# Patient Record
Sex: FEMALE | Race: WHITE | NOT HISPANIC OR LATINO
[De-identification: names, ages, dates, MRNs, and addresses within clinical notes are randomized per-mention and may not be internally consistent; named-entity substitution may affect disease eponyms.]

---

## 2017-08-27 ENCOUNTER — TRANSCRIPTION ENCOUNTER (OUTPATIENT)
Age: 72
End: 2017-08-27

## 2017-10-22 ENCOUNTER — RESULT CHARGE (OUTPATIENT)
Age: 72
End: 2017-10-22

## 2017-10-23 ENCOUNTER — APPOINTMENT (OUTPATIENT)
Dept: HEART AND VASCULAR | Facility: CLINIC | Age: 72
End: 2017-10-23
Payer: COMMERCIAL

## 2017-10-23 VITALS
HEART RATE: 63 BPM | OXYGEN SATURATION: 98 % | BODY MASS INDEX: 27.19 KG/M2 | DIASTOLIC BLOOD PRESSURE: 78 MMHG | SYSTOLIC BLOOD PRESSURE: 142 MMHG | HEIGHT: 65.67 IN | WEIGHT: 167.2 LBS | TEMPERATURE: 98.2 F

## 2017-10-23 DIAGNOSIS — Z87.09 PERSONAL HISTORY OF OTHER DISEASES OF THE RESPIRATORY SYSTEM: ICD-10-CM

## 2017-10-23 DIAGNOSIS — M15.9 POLYOSTEOARTHRITIS, UNSPECIFIED: ICD-10-CM

## 2017-10-23 PROCEDURE — 93000 ELECTROCARDIOGRAM COMPLETE: CPT

## 2017-10-23 PROCEDURE — 99214 OFFICE O/P EST MOD 30 MIN: CPT | Mod: 25

## 2017-10-26 ENCOUNTER — APPOINTMENT (OUTPATIENT)
Dept: ULTRASOUND IMAGING | Facility: CLINIC | Age: 72
End: 2017-10-26
Payer: COMMERCIAL

## 2017-10-26 ENCOUNTER — APPOINTMENT (OUTPATIENT)
Dept: MAMMOGRAPHY | Facility: CLINIC | Age: 72
End: 2017-10-26
Payer: COMMERCIAL

## 2017-10-26 ENCOUNTER — OUTPATIENT (OUTPATIENT)
Dept: OUTPATIENT SERVICES | Facility: HOSPITAL | Age: 72
LOS: 1 days | End: 2017-10-26
Payer: COMMERCIAL

## 2017-10-26 DIAGNOSIS — Z95.0 PRESENCE OF CARDIAC PACEMAKER: Chronic | ICD-10-CM

## 2017-10-26 DIAGNOSIS — Z96.641 PRESENCE OF RIGHT ARTIFICIAL HIP JOINT: Chronic | ICD-10-CM

## 2017-10-26 PROCEDURE — 76641 ULTRASOUND BREAST COMPLETE: CPT | Mod: 26,50

## 2017-10-26 PROCEDURE — 77063 BREAST TOMOSYNTHESIS BI: CPT

## 2017-10-26 PROCEDURE — 77067 SCR MAMMO BI INCL CAD: CPT

## 2017-10-26 PROCEDURE — 77063 BREAST TOMOSYNTHESIS BI: CPT | Mod: 26

## 2017-10-26 PROCEDURE — G0202: CPT | Mod: 26

## 2017-10-26 PROCEDURE — 76641 ULTRASOUND BREAST COMPLETE: CPT

## 2017-12-19 ENCOUNTER — APPOINTMENT (OUTPATIENT)
Dept: NEUROLOGY | Facility: CLINIC | Age: 72
End: 2017-12-19
Payer: COMMERCIAL

## 2017-12-19 VITALS
HEART RATE: 75 BPM | SYSTOLIC BLOOD PRESSURE: 157 MMHG | HEIGHT: 65.67 IN | OXYGEN SATURATION: 95 % | DIASTOLIC BLOOD PRESSURE: 84 MMHG | WEIGHT: 163 LBS | BODY MASS INDEX: 26.51 KG/M2

## 2017-12-19 PROCEDURE — 95910 NRV CNDJ TEST 7-8 STUDIES: CPT

## 2017-12-19 PROCEDURE — 99243 OFF/OP CNSLTJ NEW/EST LOW 30: CPT | Mod: 25

## 2017-12-19 PROCEDURE — 95886 MUSC TEST DONE W/N TEST COMP: CPT | Mod: 59

## 2017-12-20 ENCOUNTER — APPOINTMENT (OUTPATIENT)
Dept: CT IMAGING | Facility: CLINIC | Age: 72
End: 2017-12-20
Payer: COMMERCIAL

## 2017-12-20 ENCOUNTER — OUTPATIENT (OUTPATIENT)
Dept: OUTPATIENT SERVICES | Facility: HOSPITAL | Age: 72
LOS: 1 days | End: 2017-12-20
Payer: COMMERCIAL

## 2017-12-20 DIAGNOSIS — M54.12 RADICULOPATHY, CERVICAL REGION: ICD-10-CM

## 2017-12-20 DIAGNOSIS — Z95.0 PRESENCE OF CARDIAC PACEMAKER: Chronic | ICD-10-CM

## 2017-12-20 DIAGNOSIS — Z96.641 PRESENCE OF RIGHT ARTIFICIAL HIP JOINT: Chronic | ICD-10-CM

## 2017-12-20 PROCEDURE — 72125 CT NECK SPINE W/O DYE: CPT | Mod: 26

## 2017-12-20 PROCEDURE — 72125 CT NECK SPINE W/O DYE: CPT

## 2018-03-13 ENCOUNTER — APPOINTMENT (OUTPATIENT)
Dept: HEART AND VASCULAR | Facility: CLINIC | Age: 73
End: 2018-03-13
Payer: COMMERCIAL

## 2018-03-13 VITALS
BODY MASS INDEX: 27.32 KG/M2 | HEART RATE: 75 BPM | WEIGHT: 164 LBS | HEIGHT: 65 IN | SYSTOLIC BLOOD PRESSURE: 115 MMHG | DIASTOLIC BLOOD PRESSURE: 75 MMHG

## 2018-03-13 PROCEDURE — 99203 OFFICE O/P NEW LOW 30 MIN: CPT | Mod: 25

## 2018-03-13 PROCEDURE — 93280 PM DEVICE PROGR EVAL DUAL: CPT

## 2018-03-16 ENCOUNTER — APPOINTMENT (OUTPATIENT)
Dept: NEUROLOGY | Facility: CLINIC | Age: 73
End: 2018-03-16
Payer: COMMERCIAL

## 2018-03-16 VITALS
SYSTOLIC BLOOD PRESSURE: 125 MMHG | WEIGHT: 164 LBS | OXYGEN SATURATION: 96 % | DIASTOLIC BLOOD PRESSURE: 70 MMHG | HEIGHT: 65 IN | HEART RATE: 83 BPM | BODY MASS INDEX: 27.32 KG/M2

## 2018-03-16 PROCEDURE — 99214 OFFICE O/P EST MOD 30 MIN: CPT

## 2018-08-06 ENCOUNTER — APPOINTMENT (OUTPATIENT)
Dept: HEART AND VASCULAR | Facility: CLINIC | Age: 73
End: 2018-08-06
Payer: COMMERCIAL

## 2018-08-06 VITALS
HEIGHT: 65 IN | SYSTOLIC BLOOD PRESSURE: 141 MMHG | BODY MASS INDEX: 27.82 KG/M2 | WEIGHT: 167 LBS | HEART RATE: 66 BPM | DIASTOLIC BLOOD PRESSURE: 83 MMHG | OXYGEN SATURATION: 97 % | TEMPERATURE: 97.5 F

## 2018-08-06 PROCEDURE — 36415 COLL VENOUS BLD VENIPUNCTURE: CPT

## 2018-08-06 PROCEDURE — 99214 OFFICE O/P EST MOD 30 MIN: CPT | Mod: 25

## 2018-08-07 LAB
ALBUMIN SERPL ELPH-MCNC: 5.2 G/DL
ALP BLD-CCNC: 109 U/L
ALT SERPL-CCNC: 26 U/L
ANION GAP SERPL CALC-SCNC: 15 MMOL/L
AST SERPL-CCNC: 25 U/L
BASOPHILS # BLD AUTO: 0.04 K/UL
BASOPHILS NFR BLD AUTO: 0.6 %
BILIRUB SERPL-MCNC: 0.8 MG/DL
BUN SERPL-MCNC: 18 MG/DL
CALCIUM SERPL-MCNC: 9.8 MG/DL
CHLORIDE SERPL-SCNC: 101 MMOL/L
CHOLEST SERPL-MCNC: 221 MG/DL
CHOLEST/HDLC SERPL: 3.3 RATIO
CO2 SERPL-SCNC: 24 MMOL/L
CREAT SERPL-MCNC: 0.86 MG/DL
EOSINOPHIL # BLD AUTO: 0.11 K/UL
EOSINOPHIL NFR BLD AUTO: 1.6 %
GLUCOSE SERPL-MCNC: 101 MG/DL
HBA1C MFR BLD HPLC: 5.8 %
HCT VFR BLD CALC: 44.6 %
HDLC SERPL-MCNC: 68 MG/DL
HGB BLD-MCNC: 14.4 G/DL
IMM GRANULOCYTES NFR BLD AUTO: 0.1 %
LDLC SERPL CALC-MCNC: 133 MG/DL
LYMPHOCYTES # BLD AUTO: 2.12 K/UL
LYMPHOCYTES NFR BLD AUTO: 31.1 %
MAN DIFF?: NORMAL
MCHC RBC-ENTMCNC: 30.3 PG
MCHC RBC-ENTMCNC: 32.3 GM/DL
MCV RBC AUTO: 93.7 FL
MONOCYTES # BLD AUTO: 0.49 K/UL
MONOCYTES NFR BLD AUTO: 7.2 %
NEUTROPHILS # BLD AUTO: 4.05 K/UL
NEUTROPHILS NFR BLD AUTO: 59.4 %
PLATELET # BLD AUTO: 326 K/UL
POTASSIUM SERPL-SCNC: 4.3 MMOL/L
PROT SERPL-MCNC: 7.4 G/DL
RBC # BLD: 4.76 M/UL
RBC # FLD: 14.4 %
SODIUM SERPL-SCNC: 140 MMOL/L
TRIGL SERPL-MCNC: 100 MG/DL
WBC # FLD AUTO: 6.82 K/UL

## 2018-08-08 ENCOUNTER — EMERGENCY (EMERGENCY)
Facility: HOSPITAL | Age: 73
LOS: 1 days | Discharge: HOME | End: 2018-08-08
Attending: EMERGENCY MEDICINE | Admitting: EMERGENCY MEDICINE
Payer: OTHER MISCELLANEOUS

## 2018-08-08 VITALS
SYSTOLIC BLOOD PRESSURE: 182 MMHG | HEART RATE: 73 BPM | DIASTOLIC BLOOD PRESSURE: 95 MMHG | OXYGEN SATURATION: 97 % | HEIGHT: 68 IN | TEMPERATURE: 99 F | WEIGHT: 164.02 LBS | RESPIRATION RATE: 18 BRPM

## 2018-08-08 VITALS
DIASTOLIC BLOOD PRESSURE: 65 MMHG | OXYGEN SATURATION: 98 % | RESPIRATION RATE: 18 BRPM | SYSTOLIC BLOOD PRESSURE: 135 MMHG | HEART RATE: 72 BPM

## 2018-08-08 DIAGNOSIS — J45.909 UNSPECIFIED ASTHMA, UNCOMPLICATED: ICD-10-CM

## 2018-08-08 DIAGNOSIS — Z88.0 ALLERGY STATUS TO PENICILLIN: ICD-10-CM

## 2018-08-08 DIAGNOSIS — Z95.0 PRESENCE OF CARDIAC PACEMAKER: Chronic | ICD-10-CM

## 2018-08-08 DIAGNOSIS — S20.222A CONTUSION OF LEFT BACK WALL OF THORAX, INITIAL ENCOUNTER: ICD-10-CM

## 2018-08-08 DIAGNOSIS — M25.521 PAIN IN RIGHT ELBOW: ICD-10-CM
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
## 2018-08-08 DIAGNOSIS — M25.552 PAIN IN LEFT HIP: ICD-10-CM
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
## 2018-08-08 DIAGNOSIS — S40.012A CONTUSION OF LEFT SHOULDER, INITIAL ENCOUNTER: ICD-10-CM

## 2018-08-08 DIAGNOSIS — M54.12 RADICULOPATHY, CERVICAL REGION: ICD-10-CM

## 2018-08-08 DIAGNOSIS — Z79.899 OTHER LONG TERM (CURRENT) DRUG THERAPY: ICD-10-CM
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
## 2018-08-08 DIAGNOSIS — I25.10 ATHEROSCLEROTIC HEART DISEASE OF NATIVE CORONARY ARTERY WITHOUT ANGINA PECTORIS: ICD-10-CM

## 2018-08-08 DIAGNOSIS — Z96.641 PRESENCE OF RIGHT ARTIFICIAL HIP JOINT: Chronic | ICD-10-CM

## 2018-08-08 LAB
APTT BLD: 33.2 SEC — SIGNIFICANT CHANGE UP (ref 27.5–37.4)
BASOPHILS NFR BLD AUTO: 0.6 % — SIGNIFICANT CHANGE UP (ref 0–2)
CK MB CFR SERPL CALC: 5.4 NG/ML — SIGNIFICANT CHANGE UP (ref 0–6.7)
CK SERPL-CCNC: 126 U/L — SIGNIFICANT CHANGE UP (ref 25–170)
EOSINOPHIL NFR BLD AUTO: 1.8 % — SIGNIFICANT CHANGE UP (ref 0–6)
HCT VFR BLD CALC: 38.3 % — SIGNIFICANT CHANGE UP (ref 34.5–45)
HGB BLD-MCNC: 12.8 G/DL — SIGNIFICANT CHANGE UP (ref 11.5–15.5)
INR BLD: 1.07 — SIGNIFICANT CHANGE UP (ref 0.88–1.16)
LYMPHOCYTES # BLD AUTO: 35.4 % — SIGNIFICANT CHANGE UP (ref 13–44)
MCHC RBC-ENTMCNC: 29.9 PG — SIGNIFICANT CHANGE UP (ref 27–34)
MCHC RBC-ENTMCNC: 33.4 G/DL — SIGNIFICANT CHANGE UP (ref 32–36)
MCV RBC AUTO: 89.5 FL — SIGNIFICANT CHANGE UP (ref 80–100)
MONOCYTES NFR BLD AUTO: 9 % — SIGNIFICANT CHANGE UP (ref 2–14)
NEUTROPHILS NFR BLD AUTO: 53.2 % — SIGNIFICANT CHANGE UP (ref 43–77)
PLATELET # BLD AUTO: 269 K/UL — SIGNIFICANT CHANGE UP (ref 150–400)
PROTHROM AB SERPL-ACNC: 11.9 SEC — SIGNIFICANT CHANGE UP (ref 9.8–12.7)
RBC # BLD: 4.28 M/UL — SIGNIFICANT CHANGE UP (ref 3.8–5.2)
RBC # FLD: 13.2 % — SIGNIFICANT CHANGE UP (ref 10.3–16.9)
TROPONIN T SERPL-MCNC: <0.01 NG/ML — SIGNIFICANT CHANGE UP (ref 0–0.01)
WBC # BLD: 5.1 K/UL — SIGNIFICANT CHANGE UP (ref 3.8–10.5)
WBC # FLD AUTO: 5.1 K/UL — SIGNIFICANT CHANGE UP (ref 3.8–10.5)

## 2018-08-08 PROCEDURE — 72125 CT NECK SPINE W/O DYE: CPT | Mod: 26

## 2018-08-08 PROCEDURE — 72170 X-RAY EXAM OF PELVIS: CPT

## 2018-08-08 PROCEDURE — 73080 X-RAY EXAM OF ELBOW: CPT | Mod: 26,RT

## 2018-08-08 PROCEDURE — 70450 CT HEAD/BRAIN W/O DYE: CPT

## 2018-08-08 PROCEDURE — 73502 X-RAY EXAM HIP UNI 2-3 VIEWS: CPT

## 2018-08-08 PROCEDURE — 99284 EMERGENCY DEPT VISIT MOD MDM: CPT

## 2018-08-08 PROCEDURE — 82553 CREATINE MB FRACTION: CPT

## 2018-08-08 PROCEDURE — 85730 THROMBOPLASTIN TIME PARTIAL: CPT

## 2018-08-08 PROCEDURE — 85610 PROTHROMBIN TIME: CPT

## 2018-08-08 PROCEDURE — 36415 COLL VENOUS BLD VENIPUNCTURE: CPT

## 2018-08-08 PROCEDURE — 73502 X-RAY EXAM HIP UNI 2-3 VIEWS: CPT | Mod: 26,LT

## 2018-08-08 PROCEDURE — 84484 ASSAY OF TROPONIN QUANT: CPT

## 2018-08-08 PROCEDURE — 72125 CT NECK SPINE W/O DYE: CPT

## 2018-08-08 PROCEDURE — 85025 COMPLETE CBC W/AUTO DIFF WBC: CPT

## 2018-08-08 PROCEDURE — 82550 ASSAY OF CK (CPK): CPT

## 2018-08-08 PROCEDURE — 73080 X-RAY EXAM OF ELBOW: CPT

## 2018-08-08 PROCEDURE — 70450 CT HEAD/BRAIN W/O DYE: CPT | Mod: 26

## 2018-08-08 PROCEDURE — 99284 EMERGENCY DEPT VISIT MOD MDM: CPT | Mod: 25

## 2018-08-08 RX ORDER — ACETAMINOPHEN 500 MG
650 TABLET ORAL ONCE
Qty: 0 | Refills: 0 | Status: COMPLETED | OUTPATIENT
Start: 2018-08-08 | End: 2018-08-08

## 2018-08-08 RX ORDER — IBUPROFEN 200 MG
600 TABLET ORAL ONCE
Qty: 0 | Refills: 0 | Status: COMPLETED | OUTPATIENT
Start: 2018-08-08 | End: 2018-08-08

## 2018-08-08 RX ADMIN — Medication 600 MILLIGRAM(S): at 20:01

## 2018-08-08 RX ADMIN — Medication 650 MILLIGRAM(S): at 19:57

## 2018-08-08 RX ADMIN — Medication 650 MILLIGRAM(S): at 17:35

## 2018-08-08 RX ADMIN — Medication 600 MILLIGRAM(S): at 20:09

## 2018-08-08 NOTE — ED ADULT NURSE NOTE - OBJECTIVE STATEMENT
Patient c/o of left neck pain 7/10 w/ numbness and tingling on left hand. Patient c/o of left neck pain 7/10 w/ numbness and tingling on left hand.  Also c/o of left hip pain and right elbow pain.   Patient states fell yesterday on stairwell, hit back, states did not hit head, ambulatory after, w/ current pain on walking.

## 2018-08-08 NOTE — ED PROVIDER NOTE - MUSCULOSKELETAL, MLM
Spine appears normal, range of motion is not limited, no localized vertebral point of tenderness, left shoulder generalized tenderness, left upper back, along the scapula line tenderness, no deformity, neuro vascular b/l upper and lower extremities intact, symmetrical,

## 2018-08-08 NOTE — ED PROVIDER NOTE - CARE PLAN
Principal Discharge DX:	Multiple contusions  Secondary Diagnosis:	Cervical radiculopathy  Secondary Diagnosis:	Paresthesia of arm

## 2018-08-08 NOTE — ED PROVIDER NOTE - ATTENDING CONTRIBUTION TO CARE
Pt s/p fall on stairs yesterday with left lower hip pain and L sided neck pain. cannot remember if hit head, loc. no assoc cp, sob, lightheadedness, seizure like activity assoc w fall. NAD, RRR, CTAB, no abdominal tenderness, no focal neuro deficits, no midline spinal pain, ambulating. Will obtain labs, imaging to eval for arrythmia, intracranial process, metabolic derangement, trauma vs other. anticipate dc if no acute process. no cp/sob during ER stay.

## 2018-08-08 NOTE — ED PROVIDER NOTE - MEDICAL DECISION MAKING DETAILS
71 yo female s/p mechanical fall at work yesterday.  No LOC, no  syncope suspected. Pt ambulatory, appears well. ECG- non ischemic, labs including cardiac enzymes- normal, no evidence of acute fx on pelvis/hip/elbow xrays and c/spine CT scan. findings consistent with c-spine radiculopathy and degenerative changes. results discussed with pt. She will f/u with her neurologist and PMD/cardiologist for further evaluation.

## 2018-08-08 NOTE — ED ADULT NURSE NOTE - PSH
Artificial pacemaker  July 2005  History of right hip replacement  right total hip replacement  2014  Open fracture of neck of femur  Hip fracture requiring operative repair, right, open type I or II, initial encounter  2003  Presence of cardiac pacemaker  Pacemaker

## 2018-08-08 NOTE — ED ADULT NURSE NOTE - NSIMPLEMENTINTERV_GEN_ALL_ED
Implemented All Fall Risk Interventions:  [place] to call system. Call bell, personal items and telephone within reach. Instruct patient to call for assistance. Room bathroom lighting operational. Non-slip footwear when patient is off stretcher. Physically safe environment: no spills, clutter or unnecessary equipment. Stretcher in lowest position, wheels locked, appropriate side rails in place. Provide visual cue, wrist band, yellow gown, etc. Monitor gait and stability. Monitor for mental status changes and reorient to person, place, and time. Review medications for side effects contributing to fall risk. Reinforce activity limits and safety measures with patient and family.

## 2018-08-08 NOTE — ED ADULT NURSE NOTE - PMH
Asthma  Asthma  Coronary artery disease without angina pectoris, unspecified vessel or lesion type, unspecified whether native or transplanted heart    Cyst of right ovary  elevated CEA  Essential hypertension  Hypertension  Pacemaker  for supraventricular beats

## 2018-08-08 NOTE — ED ADULT NURSE REASSESSMENT NOTE - NS ED NURSE REASSESS COMMENT FT1
Patient a/oX 3, symptoms and pain improved s/p Acetaminopohen, Ibuprofen PO.  Vital signs stable.  All labs and tests resulted.  Discharged to home in stable condition.

## 2018-08-08 NOTE — ED PROVIDER NOTE - OBJECTIVE STATEMENT
73 yo female with h/o right hip replacement, h/o cervical radiculopathy, HTN, pacemaker, in the ER c/o accidental fall yesterday at work. Pt reports she slipped and fell going down the stairs, fell on her back. Pt denies LOC, mentioned that was able to ambulate after her fall shortly. Pt also c/o right elbow pain, left hip and left buttock pain, c/o left shoulder region and neck pain with movement, also c/o tingling to her left arm. Pt concerned  that today she also felt pain to her left anterior upper chest region. Denies SOB, HA, dizziness, n/v, weakness or diaphoresis.

## 2018-08-08 NOTE — ED ADULT TRIAGE NOTE - ARRIVAL INFO ADDITIONAL COMMENTS
states she tripped and fell down 5-6 steps yesterday. denies any head injuries. c.o right elbow pain, neck pain, left shoulder pain and left buttock pain. takes ASA one a day.

## 2018-08-08 NOTE — ED ADULT NURSE REASSESSMENT NOTE - NS ED NURSE REASSESS COMMENT FT1
Patient a/ox 3, c/o of left neck, back, left hip and right elbow pain s/p fall.  Paced rhythm on EKG and cardiac monitor, vital signs stable and improved.  Acetaminophen 650mg po administered for pain.  Right FA PIV #20 in place, all labs sent, no complications.  Xray and CT scan done.  REsults and disposition pending.  STable and comfortable.

## 2018-08-14 ENCOUNTER — APPOINTMENT (OUTPATIENT)
Dept: NEUROLOGY | Facility: CLINIC | Age: 73
End: 2018-08-14
Payer: COMMERCIAL

## 2018-08-14 ENCOUNTER — FORM ENCOUNTER (OUTPATIENT)
Age: 73
End: 2018-08-14

## 2018-08-14 VITALS
WEIGHT: 165 LBS | TEMPERATURE: 98 F | OXYGEN SATURATION: 97 % | BODY MASS INDEX: 25.01 KG/M2 | HEIGHT: 68 IN | DIASTOLIC BLOOD PRESSURE: 93 MMHG | SYSTOLIC BLOOD PRESSURE: 163 MMHG | HEART RATE: 59 BPM

## 2018-08-14 PROCEDURE — 99214 OFFICE O/P EST MOD 30 MIN: CPT

## 2018-08-15 ENCOUNTER — APPOINTMENT (OUTPATIENT)
Dept: ORTHOPEDIC SURGERY | Facility: CLINIC | Age: 73
End: 2018-08-15
Payer: COMMERCIAL

## 2018-08-15 ENCOUNTER — OUTPATIENT (OUTPATIENT)
Dept: OUTPATIENT SERVICES | Facility: HOSPITAL | Age: 73
LOS: 1 days | End: 2018-08-15
Payer: COMMERCIAL

## 2018-08-15 VITALS — HEIGHT: 68 IN | WEIGHT: 165 LBS | BODY MASS INDEX: 25.01 KG/M2

## 2018-08-15 DIAGNOSIS — Z96.641 PRESENCE OF RIGHT ARTIFICIAL HIP JOINT: Chronic | ICD-10-CM

## 2018-08-15 DIAGNOSIS — Z95.0 PRESENCE OF CARDIAC PACEMAKER: Chronic | ICD-10-CM

## 2018-08-15 DIAGNOSIS — Z87.39 PERSONAL HISTORY OF OTHER DISEASES OF THE MUSCULOSKELETAL SYSTEM AND CONNECTIVE TISSUE: ICD-10-CM

## 2018-08-15 PROCEDURE — 72050 X-RAY EXAM NECK SPINE 4/5VWS: CPT | Mod: 26

## 2018-08-15 PROCEDURE — 99214 OFFICE O/P EST MOD 30 MIN: CPT

## 2018-08-15 PROCEDURE — 72050 X-RAY EXAM NECK SPINE 4/5VWS: CPT

## 2018-09-12 ENCOUNTER — APPOINTMENT (OUTPATIENT)
Dept: ORTHOPEDIC SURGERY | Facility: CLINIC | Age: 73
End: 2018-09-12
Payer: COMMERCIAL

## 2018-09-12 PROCEDURE — 99215 OFFICE O/P EST HI 40 MIN: CPT

## 2018-10-23 ENCOUNTER — TRANSCRIPTION ENCOUNTER (OUTPATIENT)
Age: 73
End: 2018-10-23

## 2018-10-23 ENCOUNTER — EMERGENCY (EMERGENCY)
Facility: HOSPITAL | Age: 73
LOS: 1 days | End: 2018-10-23
Attending: EMERGENCY MEDICINE
Payer: COMMERCIAL

## 2018-10-23 VITALS
HEART RATE: 82 BPM | HEIGHT: 68 IN | OXYGEN SATURATION: 98 % | TEMPERATURE: 97 F | WEIGHT: 164.02 LBS | RESPIRATION RATE: 16 BRPM | SYSTOLIC BLOOD PRESSURE: 189 MMHG | DIASTOLIC BLOOD PRESSURE: 102 MMHG

## 2018-10-23 DIAGNOSIS — Z96.641 PRESENCE OF RIGHT ARTIFICIAL HIP JOINT: Chronic | ICD-10-CM

## 2018-10-23 DIAGNOSIS — Z95.0 PRESENCE OF CARDIAC PACEMAKER: Chronic | ICD-10-CM

## 2018-10-23 LAB
ALBUMIN SERPL ELPH-MCNC: 4.3 G/DL — SIGNIFICANT CHANGE UP (ref 3.3–5)
ALP SERPL-CCNC: 103 U/L — SIGNIFICANT CHANGE UP (ref 40–120)
ALT FLD-CCNC: 19 U/L — SIGNIFICANT CHANGE UP (ref 10–45)
ANION GAP SERPL CALC-SCNC: 13 MMOL/L — SIGNIFICANT CHANGE UP (ref 5–17)
APTT BLD: 31.3 SEC — SIGNIFICANT CHANGE UP (ref 27.5–37.4)
AST SERPL-CCNC: 20 U/L — SIGNIFICANT CHANGE UP (ref 10–40)
BASOPHILS # BLD AUTO: 0 K/UL — SIGNIFICANT CHANGE UP (ref 0–0.2)
BASOPHILS NFR BLD AUTO: 0.7 % — SIGNIFICANT CHANGE UP (ref 0–2)
BILIRUB SERPL-MCNC: 0.9 MG/DL — SIGNIFICANT CHANGE UP (ref 0.2–1.2)
BUN SERPL-MCNC: 11 MG/DL — SIGNIFICANT CHANGE UP (ref 7–23)
CALCIUM SERPL-MCNC: 9.6 MG/DL — SIGNIFICANT CHANGE UP (ref 8.4–10.5)
CHLORIDE SERPL-SCNC: 102 MMOL/L — SIGNIFICANT CHANGE UP (ref 96–108)
CO2 SERPL-SCNC: 24 MMOL/L — SIGNIFICANT CHANGE UP (ref 22–31)
CREAT SERPL-MCNC: 0.7 MG/DL — SIGNIFICANT CHANGE UP (ref 0.5–1.3)
EOSINOPHIL # BLD AUTO: 0.1 K/UL — SIGNIFICANT CHANGE UP (ref 0–0.5)
EOSINOPHIL NFR BLD AUTO: 1 % — SIGNIFICANT CHANGE UP (ref 0–6)
ERYTHROCYTE [SEDIMENTATION RATE] IN BLOOD: 7 MM/HR — SIGNIFICANT CHANGE UP (ref 0–20)
GLUCOSE SERPL-MCNC: 88 MG/DL — SIGNIFICANT CHANGE UP (ref 70–99)
HCT VFR BLD CALC: 39.1 % — SIGNIFICANT CHANGE UP (ref 34.5–45)
HGB BLD-MCNC: 13.3 G/DL — SIGNIFICANT CHANGE UP (ref 11.5–15.5)
INR BLD: 1.06 RATIO — SIGNIFICANT CHANGE UP (ref 0.88–1.16)
LYMPHOCYTES # BLD AUTO: 1.6 K/UL — SIGNIFICANT CHANGE UP (ref 1–3.3)
LYMPHOCYTES # BLD AUTO: 25.7 % — SIGNIFICANT CHANGE UP (ref 13–44)
MCHC RBC-ENTMCNC: 30 PG — SIGNIFICANT CHANGE UP (ref 27–34)
MCHC RBC-ENTMCNC: 34 GM/DL — SIGNIFICANT CHANGE UP (ref 32–36)
MCV RBC AUTO: 88.3 FL — SIGNIFICANT CHANGE UP (ref 80–100)
MONOCYTES # BLD AUTO: 0.5 K/UL — SIGNIFICANT CHANGE UP (ref 0–0.9)
MONOCYTES NFR BLD AUTO: 8.8 % — SIGNIFICANT CHANGE UP (ref 2–14)
NEUTROPHILS # BLD AUTO: 3.9 K/UL — SIGNIFICANT CHANGE UP (ref 1.8–7.4)
NEUTROPHILS NFR BLD AUTO: 63.7 % — SIGNIFICANT CHANGE UP (ref 43–77)
PLATELET # BLD AUTO: 288 K/UL — SIGNIFICANT CHANGE UP (ref 150–400)
POTASSIUM SERPL-MCNC: 3.7 MMOL/L — SIGNIFICANT CHANGE UP (ref 3.5–5.3)
POTASSIUM SERPL-SCNC: 3.7 MMOL/L — SIGNIFICANT CHANGE UP (ref 3.5–5.3)
PROT SERPL-MCNC: 6.7 G/DL — SIGNIFICANT CHANGE UP (ref 6–8.3)
PROTHROM AB SERPL-ACNC: 11.5 SEC — SIGNIFICANT CHANGE UP (ref 9.8–12.7)
RBC # BLD: 4.43 M/UL — SIGNIFICANT CHANGE UP (ref 3.8–5.2)
RBC # FLD: 12.4 % — SIGNIFICANT CHANGE UP (ref 10.3–14.5)
SODIUM SERPL-SCNC: 139 MMOL/L — SIGNIFICANT CHANGE UP (ref 135–145)
WBC # BLD: 6 K/UL — SIGNIFICANT CHANGE UP (ref 3.8–10.5)
WBC # FLD AUTO: 6 K/UL — SIGNIFICANT CHANGE UP (ref 3.8–10.5)

## 2018-10-23 PROCEDURE — 73140 X-RAY EXAM OF FINGER(S): CPT | Mod: 26,RT

## 2018-10-23 PROCEDURE — 99218: CPT

## 2018-10-23 RX ORDER — LISINOPRIL 2.5 MG/1
40 TABLET ORAL DAILY
Qty: 0 | Refills: 0 | Status: DISCONTINUED | OUTPATIENT
Start: 2018-10-23 | End: 2018-10-27

## 2018-10-23 RX ORDER — ASPIRIN/CALCIUM CARB/MAGNESIUM 324 MG
81 TABLET ORAL DAILY
Qty: 0 | Refills: 0 | Status: DISCONTINUED | OUTPATIENT
Start: 2018-10-23 | End: 2018-10-27

## 2018-10-23 RX ORDER — SODIUM CHLORIDE 9 MG/ML
3 INJECTION INTRAMUSCULAR; INTRAVENOUS; SUBCUTANEOUS EVERY 8 HOURS
Qty: 0 | Refills: 0 | Status: DISCONTINUED | OUTPATIENT
Start: 2018-10-23 | End: 2018-10-27

## 2018-10-23 RX ADMIN — Medication 100 MILLIGRAM(S): at 22:45

## 2018-10-23 NOTE — ED ADULT NURSE NOTE - NSIMPLEMENTINTERV_GEN_ALL_ED
Implemented All Universal Safety Interventions:  [place] to call system. Call bell, personal items and telephone within reach. Instruct patient to call for assistance. Room bathroom lighting operational. Non-slip footwear when patient is off stretcher. Physically safe environment: no spills, clutter or unnecessary equipment. Stretcher in lowest position, wheels locked, appropriate side rails in place.

## 2018-10-23 NOTE — ED ADULT NURSE NOTE - OBJECTIVE STATEMENT
71 yo presents to the ED from home. A&Ox4 c/o small bleeding cut on her right 2nd finger, noted last night, unsure what she cut it on, reports that she was doing the dishes but denies any broken dishes. pt reports that this morning she noted a blister form which throughout the day became reddened, swollen and blister became more full of fluid. pt reports increased pain with bending finger. denies fever, chills. pt was sent to ED from urgent care. PMH of right hip replacement, h/o cervical radiculopathy, HTN, pacemaker. VSS. well appearing.

## 2018-10-23 NOTE — ED PROVIDER NOTE - OBJECTIVE STATEMENT
71 yo female presents to the ER for evaluation of right pointer finger infection. Pt states "I noticed  abrasions to finger yesterday and I am not sure how I injured my finger.  Today I noticed that my finger became swollen, red and painful". 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  Pt denies recent fevers and chills and reports mild throbbing pain to site.

## 2018-10-23 NOTE — ED ADULT TRIAGE NOTE - CHIEF COMPLAINT QUOTE
pt reports she had a small bleeding cut on her right 2nd finger that throughout the day became reddened, swollen and with a pus filled blister  pt was sent to ED from urgent care

## 2018-10-23 NOTE — ED PROVIDER NOTE - ATTENDING CONTRIBUTION TO CARE
Attending MD Perez: I personally have seen and examined this patient.  NP note reviewed and agree on plan of care and except where noted.  See below for details.    Patient seen in FT eye room, unaccompanied     72F with PMH/PSH including HTN, DM, bradycardia, R hip ORIF, TAHBSO presents to the ED with R index finger pain, swelling.  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  R hand dominant.  Reports that she had also been gardening on Sunday.  Reports that today she noted that the finger had become erythematous, edematous and painful.  Denies fevers, chills.  Reports able to range finger but painful. Denies chest pain, shortness of breath, palpitations. Denies abdominal pain, nausea, vomiting, diarrhea, blood in stools. Denies urinary complaints.  On exam, NAD, head NCAT, unlabored breathing, moving all extremities, ambulating unassisted with steady gait, R index finger with abscess to the dorsolateral aspect at the DIP, able to flex and extend against resistance at DIP, PIP and MCP of R index finger, +tenderness around area of abscess but none extending proximally, +streaking up forearm to about mid forearm along dorsal aspect, +2 radials, cap refill <2s, mild swelling to dorsal hand extending from base of R index finger, mild edema; A/P: 72F with R index finger abscess with cellulitis extending up forearm, will obtain labs, cultures, consult ortho hand, likely will be placed in CDU for IV antibiotics and reassessment

## 2018-10-23 NOTE — ED CDU PROVIDER INITIAL DAY NOTE - ATTENDING CONTRIBUTION TO CARE
Attending MD Perez:   I personally have seen and examined this patient.  Physician assistant note reviewed and agree on plan of care and except where noted.  See below for details.     Patient seen in FT eye room, unaccompanied     72F with PMH/PSH including HTN, DM, bradycardia, R hip ORIF, TAHBSO presents to the ED with R index finger pain, swelling.  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  R hand dominant.  Reports that she had also been gardening on Sunday.  Reports that today she noted that the finger had become erythematous, edematous and painful.  Denies fevers, chills.  Reports able to range finger but painful. Denies chest pain, shortness of breath, palpitations. Denies abdominal pain, nausea, vomiting, diarrhea, blood in stools. Denies urinary complaints.  On exam, NAD, head NCAT, unlabored breathing, moving all extremities, ambulating unassisted with steady gait, R index finger with abscess to the dorsolateral aspect at the DIP, able to flex and extend against resistance at DIP, PIP and MCP of R index finger, +tenderness around area of abscess but none extending proximally, +streaking up forearm to about mid forearm along dorsal aspect, +2 radials, cap refill <2s, mild swelling to dorsal hand extending from base of R index finger, mild edema; A/P: 72F with R index finger abscess with cellulitis extending up forearm, labs and cultures obtained, seen by ortho hand, CDU for IV antibiotics and reassessment

## 2018-10-23 NOTE — ED CDU PROVIDER DISPOSITION NOTE - CLINICAL COURSE
Patient is 71 yo female w/ reported hx of HTN presented to ED for evaluation of right pointer finger infection. Pt states she noticed a small cut to same finger while washing dishes yesterday and today noticed finger became swollen, red and painful. Pt states she went to urgent care and referred to ED for further evaluation. 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  In ED, patient had laboratory unremarkable and was started on Clindamycin IVPB. Hand specialist was consulted  and patient sent to CDU for IV antibiotics and pain control. Patient is 71 yo female w/ reported hx of HTN presented to ED for evaluation of right pointer finger infection. Pt states she noticed a small cut to same finger while washing dishes yesterday and today noticed finger became swollen, red and painful. Pt states she went to urgent care and referred to ED for further evaluation. 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  In ED, patient had laboratory unremarkable and was started on Clindamycin IVPB. Hand specialist was consulted and patient sent to CDU for IV antibiotics and pain control. Orthopedics performed an I&D on the abscess on the 2nd digit with dressing placement. Pt was afebrile overnight. In the AM erythema of RUE had resolved, R 2nd finger remained NVI, mild swelling and TTP. No proximal LAD, no crepitus, no fluctuance. Ortho was contacted and stated that they did not need to reevaluate pt in the CDU. Pt stable and ready for DC.

## 2018-10-23 NOTE — ED CDU PROVIDER INITIAL DAY NOTE - OBJECTIVE STATEMENT
Patient is 71 yo female w/ reported hx of HTN presented to ED for evaluation of right pointer finger infection. Pt states she noticed a small cut to same finger while washing dishes yesterday and today noticed finger became swollen, red and painful. Pt states she went to urgent care and referred to ED for further evaluation. 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    In ED, patient had laboratory unremarkable and was started on Clindamycin IVPB. Hand specialist was consulted  and patient sent to CDU for IV antibiotics and pain control. Patient is 73 yo female w/ reported hx of HTN presented to ED for evaluation of right pointer finger infection. Pt states she noticed a small cut to same finger while washing dishes yesterday and today noticed finger became swollen, red and painful. Pt states she went to urgent care and referred to ED for further evaluation. 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    In ED, patient had laboratory unremarkable and was started on Clindamycin IVPB. Hand specialist was consulted  and patient sent to CDU for IV antibiotics and pain control. Patient reports tetanus vaccination uptodate (2017)

## 2018-10-23 NOTE — ED CDU PROVIDER DISPOSITION NOTE - PLAN OF CARE
Follow up with .........Rest and elevate affected area. Take Clindamycin 300mg four times daily. Take Motrin 600mg every 8 hours with food for pain. Any worsening redness, swelling, streaking (red lines), fever, chills retrun to ER 1) Follow up with the hand specialist (Dr. Jansen) outpatient in 1 week.   2) Rest and elevate affected area. Wash the area, change the dressing and apply topical antibiotic daily.  3) Take Clindamycin 300mg four times daily, take a probiotic with this medication. Take Motrin 600mg every 8 hours with food for pain.   4) Return to the ER if you experience any worsening redness, swelling, streaking (red lines), fever, chills, numbness, tingling, weakness, inability to move finger, or if any other concerns.

## 2018-10-24 VITALS
OXYGEN SATURATION: 95 % | TEMPERATURE: 98 F | RESPIRATION RATE: 18 BRPM | HEART RATE: 71 BPM | SYSTOLIC BLOOD PRESSURE: 156 MMHG | DIASTOLIC BLOOD PRESSURE: 90 MMHG

## 2018-10-24 LAB — CRP SERPL-MCNC: 0.23 MG/DL — SIGNIFICANT CHANGE UP (ref 0–0.4)

## 2018-10-24 PROCEDURE — 26010 DRAINAGE OF FINGER ABSCESS: CPT

## 2018-10-24 PROCEDURE — 85027 COMPLETE CBC AUTOMATED: CPT

## 2018-10-24 PROCEDURE — 85610 PROTHROMBIN TIME: CPT

## 2018-10-24 PROCEDURE — 87205 SMEAR GRAM STAIN: CPT

## 2018-10-24 PROCEDURE — 99284 EMERGENCY DEPT VISIT MOD MDM: CPT | Mod: 25

## 2018-10-24 PROCEDURE — G0378: CPT

## 2018-10-24 PROCEDURE — 86140 C-REACTIVE PROTEIN: CPT

## 2018-10-24 PROCEDURE — 87070 CULTURE OTHR SPECIMN AEROBIC: CPT

## 2018-10-24 PROCEDURE — 87186 SC STD MICRODIL/AGAR DIL: CPT

## 2018-10-24 PROCEDURE — 80053 COMPREHEN METABOLIC PANEL: CPT

## 2018-10-24 PROCEDURE — 85652 RBC SED RATE AUTOMATED: CPT

## 2018-10-24 PROCEDURE — 96374 THER/PROPH/DIAG INJ IV PUSH: CPT

## 2018-10-24 PROCEDURE — 73140 X-RAY EXAM OF FINGER(S): CPT

## 2018-10-24 PROCEDURE — 96376 TX/PRO/DX INJ SAME DRUG ADON: CPT | Mod: XU

## 2018-10-24 PROCEDURE — 99217: CPT

## 2018-10-24 PROCEDURE — 85730 THROMBOPLASTIN TIME PARTIAL: CPT

## 2018-10-24 RX ADMIN — SODIUM CHLORIDE 3 MILLILITER(S): 9 INJECTION INTRAMUSCULAR; INTRAVENOUS; SUBCUTANEOUS at 06:47

## 2018-10-24 RX ADMIN — Medication 100 MILLIGRAM(S): at 06:38

## 2018-10-24 NOTE — ED CDU PROVIDER SUBSEQUENT DAY NOTE - HISTORY
CDU PROGRESS NOTE PA JUDITH: Patient was evaluated by Plastics, I&D performed abscess right 2nd digit. Pt resting comfortably, without complaint. NAD, VSS., afebrile, Will continue with antibiotics and monitor.

## 2018-10-24 NOTE — ED ADULT NURSE REASSESSMENT NOTE - NS ED NURSE REASSESS COMMENT FT1
12.15 pm Pt was reviewed by Dr harmon . Pt feeling better PT Discharged ML out MARIJA Huber explained the follow up care  & gave the discharge summary  Pt is A&OX4  has steady gait  stable vitals at the time of discharge Pt verbalized the understanding on follow up care
Pt received from CHHAYA Hewitt. Pt oriented to CDU & plan of care was discussed. Pt A&O x 4. Pt In CDU for IV abx and pain control. Pt states she had 3/10 right index finger pain, doesn't not want anything for the pain. Pt states she is able to move right index finger but it is hard to bend. Pt right index finger has a blister to area and is red, and swollen. Safety & comfort measures maintained. Call bell in reach. Will continue to monitor.
07.00 Am Received report from CHHAYA Driver Pt is observed for Right finger abscess  07.45 Am Pt reassessed PT A&OX4  Jazmin N/V/fever chills CP sob dizziness headache States she feels better with finger redness & swelling Comfort & safety measures continued  Call bell with in the reach  clear liquid diet started pt tolerating well  Awaiting for evaluation    09.00 Am Pt got evaluated by CDU MD DR Harmon wound care given by Dr harmon

## 2018-10-24 NOTE — CONSULT NOTE ADULT - SUBJECTIVE AND OBJECTIVE BOX
HPI: 72yFemale w/ hx of HTN, HLD, and CAD who presents with <1 day of R 2nd finger digit pain associated with a pustule over her DIP joint. Patient says that she previously noticed the developing pustule but was hoping it would resolve without intervention. Today he noticed over the duration of the day that it was getting more painful and her forearm was becoming swollen and red as well, so she came to the ED. Denies fevers/chills/nausea/emesis. Denies prior trauma to that hand or recent abrasions. Patient works as a nurse at [employer].    Review of systems: As per HPI, otherwise negative.     PAST MEDICAL & SURGICAL HISTORY:  Cyst of right ovary: elevated CEA  Pacemaker: for supraventricular beats  Coronary artery disease without angina pectoris, unspecified vessel or lesion type, unspecified whether native or transplanted heart  Asthma: Asthma  Essential hypertension: Hypertension  History of right hip replacement: right total hip replacement  2014  Artificial pacemaker: July 2005  Presence of cardiac pacemaker: Pacemaker  Open fracture of neck of femur: Hip fracture requiring operative repair, right, open type I or II, initial encounter  2003    Family History: FAMILY HISTORY:  Noncontributory    Medications: MEDICATIONS  (STANDING):  aspirin  chewable 81 milliGRAM(s) Oral daily  clindamycin IVPB 600 milliGRAM(s) IV Intermittent every 8 hours  diltiazem    Tablet 60 milliGRAM(s) Oral two times a day  lisinopril 40 milliGRAM(s) Oral daily  sodium chloride 0.9% lock flush 3 milliLiter(s) IV Push every 8 hours    T(C): 36.7 (10-24-18 @ 00:54), Max: 36.7 (10-23-18 @ 22:15)  HR: 65 (10-24-18 @ 00:54) (60 - 82)  BP: 117/65 (10-24-18 @ 00:54) (117/65 - 189/102)  RR: 17 (10-24-18 @ 00:54) (16 - 18)  SpO2: 97% (10-24-18 @ 00:54) (95% - 98%)                        13.3   6.0   )-----------( 288      ( 23 Oct 2018 21:30 )             39.1     10-23    139  |  102  |  11  ----------------------------<  88  3.7   |  24  |  0.70    Ca    9.6      23 Oct 2018 21:30    TPro  6.7  /  Alb  4.3  /  TBili  0.9  /  DBili  x   /  AST  20  /  ALT  19  /  AlkPhos  103  10-23    EXAM:  Awake, Alert and in no acute distress  RUE:  2nd digit with <1 cm pustule over the dorsal/radial aspect of the finger, some red streaking up the forearm  Patient able to flex the DIP joint (0-25 with mild pain)  No tenderness over the flexor sheath, no pain with passive extension of finger, minimal swelling of digit  SILT throughout  Fires EPL/FDP/IO  WWP distally, good cap refill    Imaging  XR of the R hand negative, read as having a lucency near the first proximal phalanx, however patient is asymptomatic there    Procedure:  2nd digit was sterilely prepped with chlorhexidine  mid-axial incision was made superficially to open the pustule with immediate expression of pus  two culture sticks were sterilely incubated with the purulent material  Remainder of purulence was forcibly expressed through the small incision  Dressed with sterile dry dressing  Patient NVI following procedure    A/P: This is a 72y Female with R 2nd digit dorsal superficial abscess s/p I&D in ED with associated forearm cellulitis    - Pain control   - Continue IV abx  - Follow up wound cultures and adjust antibiotics as indicated  - Will continue to follow, change dressings daily and monitor patient clinically for signs of infection resolution    Patient discussed with Dr. Inderjit Nolan MD

## 2018-10-24 NOTE — ED CDU PROVIDER SUBSEQUENT DAY NOTE - PROGRESS NOTE DETAILS
CDU PROGRESS NOTE PA JUDITH: Patient was evaluated by Plastics, I&D performed abscess right 2nd digit. Pt resting comfortably, without complaint. NAD, VSS., afebrile, Will continue with antibiotics and monitor. CDU PROGRESS NOTE PA JUDITH: Pt resting comfortably, feeling well without complaint. NAD. erythema to right hand decreasing. Patient states pain has also subsided. declines pain medication. pt states red streak up R forearm resolved and much less swelling R index finger.  P/E - nad, alert, dressing removed. - sl swelling and tenderness R index finger - no ascending lymphangitis or epitrochlear nodes.  pt stable for D/C with Clindamycin. Pt seen at bedside. Pt in NAD, comfortable. VS stable from last reading. Pt has no complaints at this time. Denies finger pain, redness, swelling, numbness, paresthesias, weakness, f/c, abd pain, CP, SOB. Pt reports great improvement of erythema with IV abx. R 2nd finger NVI, mild swelling and TTP. No proximal LAD, no crepitus, no fluctuance. I spoke with Chidi from orthopedics, pt does not need to be reevaluated by ortho in the ED. Discussed plan with pt. All questions answered. Pt stable and ready for DC.

## 2018-10-24 NOTE — ED ADULT NURSE REASSESSMENT NOTE - MUSCULOSKELETAL WDL
Pt states she is able to move right index finger but it is hard to bend because it is swollen.
Full range of motion of upper and lower extremities, no joint tenderness/swelling.

## 2018-10-24 NOTE — ED CDU PROVIDER SUBSEQUENT DAY NOTE - PHYSICAL EXAMINATION
GENERAL: Pt in NAD, A&O x3.  EYES: Sclera without injection, PERRLA.  EENT: Nares without deformity no DC. No auricular tenderness, Mouth and oral cavity without lesions. Neck supple FROM.  RESP: CTA anterior and posterior b/l, no wheezes, rales, or rhonchi.   CARDIAC: RRR, clear distinct S1, S2, no S3, S4, murmurs, gallops, or rubs.  EXTREMITY:  S/p I&D to dorsal R 2nd digit no active bleeding or DC. Mild swelling, no surrounding erythema, no streaking, no surrounding erythema. No crepitus. Color appropriate for race, warm, 2+ radial and ulnar pulses b/l, cap refill < 2 seconds. FROM w/o pain b/l UE. Normal and equal sensation and 5/5 strength b/l UE.

## 2018-10-26 LAB
-  AMPICILLIN/SULBACTAM: SIGNIFICANT CHANGE UP
-  AMPICILLIN/SULBACTAM: SIGNIFICANT CHANGE UP
-  CEFAZOLIN: SIGNIFICANT CHANGE UP
-  CEFAZOLIN: SIGNIFICANT CHANGE UP
-  CLINDAMYCIN: SIGNIFICANT CHANGE UP
-  CLINDAMYCIN: SIGNIFICANT CHANGE UP
-  ERYTHROMYCIN: SIGNIFICANT CHANGE UP
-  ERYTHROMYCIN: SIGNIFICANT CHANGE UP
-  GENTAMICIN: SIGNIFICANT CHANGE UP
-  GENTAMICIN: SIGNIFICANT CHANGE UP
-  OXACILLIN: SIGNIFICANT CHANGE UP
-  OXACILLIN: SIGNIFICANT CHANGE UP
-  PENICILLIN: SIGNIFICANT CHANGE UP
-  PENICILLIN: SIGNIFICANT CHANGE UP
-  RIFAMPIN: SIGNIFICANT CHANGE UP
-  RIFAMPIN: SIGNIFICANT CHANGE UP
-  TETRACYCLINE: SIGNIFICANT CHANGE UP
-  TETRACYCLINE: SIGNIFICANT CHANGE UP
-  TRIMETHOPRIM/SULFAMETHOXAZOLE: SIGNIFICANT CHANGE UP
-  TRIMETHOPRIM/SULFAMETHOXAZOLE: SIGNIFICANT CHANGE UP
-  VANCOMYCIN: SIGNIFICANT CHANGE UP
-  VANCOMYCIN: SIGNIFICANT CHANGE UP
METHOD TYPE: SIGNIFICANT CHANGE UP
METHOD TYPE: SIGNIFICANT CHANGE UP

## 2018-10-27 NOTE — ED POST DISCHARGE NOTE - ADDITIONAL DOCUMENTATION
spoke with patient, she states redness and swelling have improved but her thumb is now red. she states she doesn't feel herself today but has not checked temp. advised will change to bactrim and if she develops fever/chills or worsening/extension she should return to the ED for evaluation. discussed that hand infections can become serious quickly ad she demonstrates understanding and states she "doesn't feel that bad" but will certainly return tomorrow if she is not improved - Bozena Sandoval PA-C

## 2018-10-27 NOTE — ED POST DISCHARGE NOTE - DETAILS
lvm to call back admin line. patient dced on clinda, results indicate inducible resistance - will see how patient is feeling but may require change to cephalosporin or bactrim - Bozena Sandoval PA-C patient called back and had discussion -  see additional documentation - Bozena Sandoval PA-C

## 2018-10-29 LAB
CULTURE RESULTS: SIGNIFICANT CHANGE UP
CULTURE RESULTS: SIGNIFICANT CHANGE UP
ORGANISM # SPEC MICROSCOPIC CNT: SIGNIFICANT CHANGE UP
SPECIMEN SOURCE: SIGNIFICANT CHANGE UP
SPECIMEN SOURCE: SIGNIFICANT CHANGE UP

## 2019-01-22 ENCOUNTER — APPOINTMENT (OUTPATIENT)
Dept: ORTHOPEDIC SURGERY | Facility: CLINIC | Age: 74
End: 2019-01-22
Payer: COMMERCIAL

## 2019-01-22 PROCEDURE — 99213 OFFICE O/P EST LOW 20 MIN: CPT

## 2019-01-22 NOTE — PHYSICAL EXAM
[de-identified] : Spine: Spine: Physical Exam:\par \par General: patient is well developed, well nourished, in no acute \par distress, alert and oriented x 3. \par \par Mood and affect: normal\par \par Respiratory: no respiratory distress noted\par \par Skin: no scars over spine, skin intact, no erythema, increased warmth\par \par Alignment:The spine is well compensated in the coronal and sagittal plane. There is no asymmetry on Diaz Forward Bend Test.\par \par Gait: The patient has moderate difficulty with toe walk and heel walk. The patient is able to tandem walk without difficulty.\par \par Palpation: no tenderness to palpation midline along spine or paraspinal region\par \par Range of motion: Cervical ROM restricted, Lumbar spine ROM is full\par \par Neurologic Exam:\par Motor: Manual Muscle testing in the upper and lower extremities is 5 out of 5 in all muscle groups. There is no evidence of muscular atrophy in the upper extremities. Sensory: Sensation to light touch is grossly intact in the upper and lower extremities\par \par Reflexes: DTR are present and symmetric throughout, negative starr bilaterally, negative inverted radial reflex bilaterally, no clonus, plantar responses are flexor\par \par Special tests: + tinels right, + phalen left, Spurlings sign absent. Lhermitte's sign is absent. Straight Leg Raise Negative, Cross Straight Leg Raise Negative, ROBIN Test Negative\par \par Hip Exam: Full painless ROM of bilateral hips\par \par Vascular: Examination of the peripheral vascular system demonstrates no evidence of congestion or edema. no lymphedema bilateral lower extremities, pulses are present and symmetric in both lower extremities.  [de-identified] : XR cervical 8/15/18: moderate multilevel degenerative changes, no spondylolisthesis, no acute fractures\par \par CT 8/8/18: Severe bilateral C4/5 foraminal stenosis, with osseous fragment within the left neural foramen, moderate-severe multilevel bilateral foraminal stenosis; no central canal stenosis; no large disc herniations, no spondylolisthesis. \par \par CT myelogram 9/2018: severe left and mod to severe right C4/5 foraminal stenosis; C5/6 moderate central stenosis without signicant cord compression. Bilateral C5/6 and C6/7 foraminal stenosis. Extensive disc degeneration.

## 2019-01-22 NOTE — HISTORY OF PRESENT ILLNESS
[de-identified] : Follow up: has axial neck pain.  has radiation of to left shoulder and upper extremity for a few minutes at at time approx every other day.  does not affect quality of life.  working.  no new weakness per patient.  complains of left groin and left lateral thigh pain.  does not radiate distal to knee.  had R RUBIN in past and she feels this pain is similar.\par \par Follow up: has axial neck pain. radiation of neck pain to shoulder and extremities has resolved. no significant numbness/tingling. no bowel or bladder symtpoms. Had CT myelogram cervical and thoracic.\par \par Initial: Referred by Dr. Craig\par \par Ms. MARIEE is a very pleasant 72 year old female who complains of neck pain for years, atraumatic, worsening after fall last week. On initial presentation symptoms were as follows: Patient described the pain as constant. Patient rated the pain as 5/10 today, 6/10 average this week, 9/10 at its worst. The pain localized to neck, worse on left side. The pain radiates down both arms to all fingers. Patient has upper extremity paresthesias in the same distribution. The pain is relieved by lying down. The pain is worsened by activity. She has also noticed difficulty with balance in the past few years, progressing. Past treatments included pharmacologic management, with minimal temporary relief. The patient has not had physical therapy or steroid injections. Patient saw Dr. Craig yesterday, who ordered a CT myelogram cervical (patient has a pacemaker-2003).\par \par The patient reports loss of bilateral hand dexterity and  strength.\par The patient states she has had difficulty balance when walking for several years.\par There no sensory loss in the arms or legs\par The patent no difficulty with urination.\par \par The patient no history of previous spine surgery.\par The patient no previous spine consultation.\par \par Pain:\par Neck 40 Right Arm 30 Left Arm 30\par \par The patient has no history of unexpected weight loss, no history of active cancer, no history bladder or bowel dysfunction, no night pain, no fevers or chills.\par \par The past medical history, surgical history, family history, allergies, medications, review of systems, family history and social history were reviewed and non contributory.

## 2019-01-22 NOTE — DISCUSSION/SUMMARY
[de-identified] : Discussed the results of the patient's history, physical exam, and imaging. Ms. Corona's radicular pain has mainly resolved. She had no sign of myelopathy on examination today. I have counseled her against extension posturing of her neck as this has caused radicular symptoms in past. She will follow up with me in 3-6 months and continue to follow up with Dr. Craig. Does not need pain manageemnt at this point, but could consider epidural for radiating pain if needed in leiu of surgery if desidered.  Could also consider medial branch blocks for axial neck pain given extensive facet arthrosis multilevel. All questions were answered.

## 2019-02-20 ENCOUNTER — FORM ENCOUNTER (OUTPATIENT)
Age: 74
End: 2019-02-20

## 2019-02-21 ENCOUNTER — OUTPATIENT (OUTPATIENT)
Dept: OUTPATIENT SERVICES | Facility: HOSPITAL | Age: 74
LOS: 1 days | End: 2019-02-21
Payer: COMMERCIAL

## 2019-02-21 ENCOUNTER — APPOINTMENT (OUTPATIENT)
Dept: ORTHOPEDIC SURGERY | Facility: CLINIC | Age: 74
End: 2019-02-21
Payer: COMMERCIAL

## 2019-02-21 VITALS — HEIGHT: 68 IN | WEIGHT: 165 LBS | BODY MASS INDEX: 25.01 KG/M2

## 2019-02-21 DIAGNOSIS — Z96.641 PRESENCE OF RIGHT ARTIFICIAL HIP JOINT: Chronic | ICD-10-CM

## 2019-02-21 DIAGNOSIS — Z95.0 PRESENCE OF CARDIAC PACEMAKER: Chronic | ICD-10-CM

## 2019-02-21 PROCEDURE — 73521 X-RAY EXAM HIPS BI 2 VIEWS: CPT | Mod: 26

## 2019-02-21 PROCEDURE — 73564 X-RAY EXAM KNEE 4 OR MORE: CPT

## 2019-02-21 PROCEDURE — 99214 OFFICE O/P EST MOD 30 MIN: CPT

## 2019-02-21 PROCEDURE — 73564 X-RAY EXAM KNEE 4 OR MORE: CPT | Mod: 26,50

## 2019-02-21 PROCEDURE — 72020 X-RAY EXAM OF SPINE 1 VIEW: CPT | Mod: 26

## 2019-02-21 PROCEDURE — 73521 X-RAY EXAM HIPS BI 2 VIEWS: CPT

## 2019-02-21 PROCEDURE — 72020 X-RAY EXAM OF SPINE 1 VIEW: CPT

## 2019-02-21 NOTE — HISTORY OF PRESENT ILLNESS
[Worsening] : worsening [___ mths] : [unfilled] month(s) ago [Constant] : ~He/She~ states the symptoms seem to be constant [Hip Movement] : worsened by hip movement [None] : No relieving factors are noted [de-identified] : 74 y/o F presents for evaluation of left hip area pain that radiates down the leg. The pain is constant and began several months ago but is getting progressively worse. Patient reports that it starts in the left hip but shoots down the leg to the left thigh, knee and ankle. She describes the pain in her left hip as moderate. The patient feels left knee pain with walking and stairs as well as mild stiffness. She can walk greater than 10 blocks with a slight to moderate limp and has difficulty using stairs. Patient is unable to sit comfortably in any chair and has trouble finding a comfortable position to sleep in. Acetaminophen, ibuprofen and naproxen do not help to relieve pain. \par Of note, patient is s/p right hip cannulated screw fixation/ORIF in 2003 after a fracture and subsequent right THR in 2014 with Dr. Elizabeth. She sees Dr. Hurley for her spine. Patient had a pacemaker put in in 2005 and therefore cannot do an MRI.

## 2019-02-21 NOTE — ADDENDUM
[FreeTextEntry1] : This note was written by Nanda Garcia on 02/21/2019  acting as scribe for Dr. Desai and MARIJA Ruiz.\par

## 2019-02-21 NOTE — END OF VISIT
[FreeTextEntry3] : All medical record entries made by the Scribe were at my, Dr. Desai's, discretion and personally dictated by me on 02/21/2019. I have reviewed the chart and agree that the record accurately reflects my personal performance of the history, physical exam, assessment and plan. I have also personally directed, reviewed and agreed to the chart.

## 2019-02-21 NOTE — DISCUSSION/SUMMARY
[de-identified] : Ms. Corona presents with left hip area pain that radiates down the left thigh, knee and ankle. She has some underlying arthritis and I suspect she also has an inflamed left hip joint. I explained that the muscles around the hip joint can go into spasm causing bursitis to flare up. I believe that this is the cause of tenderness to the side of her left hip and the cause of her difficulty finding a comfortable position to sleep in. Additionally, patient has arthritis of the neck and I informed her that this indicates she may also have low back arthritis, which could contribute to her pain and discomfort. I wrote a prescription for physical therapy to strengthen and stretch out the left hip area and greater trochanter. I also wrote a Celebrex prescription. \par If her symptoms do not improve after 4-6 weeks, follow up for a CT of the hip (Patient cannot get an MRI because she has a pacemaker from 2005).\par Follow up PRN.

## 2019-02-21 NOTE — PHYSICAL EXAM
[de-identified] : Constitutional: Well appearing. No acute distress.\par Mental Status: Alert & oriented to person, place and time. Normal affect.\par Pulmonary: No respiratory distress. Normal chest excursion.\par \par Gait: Normal.\par Ambulatory assist devices: None.\par \par Cervical spine: Skin intact. No visible deformity. Painless active ROM without evident restriction.\par Bilateral upper extremities: Skin intact. No deformity. Painless active ROM without evident restriction.\par Thoracolumbar spine: No deformity. No tenderness. No radicular pain on passive straight leg raise bilaterally.\par Pelvis: No pelvic obliquity. No tenderness.\par Leg lengths: Equal.\par \par Right Hip:\par Skin intact.\par Well-healed surgical scar.\par No erythema.\par No ecchymosis.\par No swelling.\par No deformity.\par No focal tenderness.\par Painless and unrestricted range of motion.\par No crepitation.\par No instability.\par ROBIN painless.\par Impingement (FADIR) painless.\par Stinchfield painless.\par Flexor power 5/5.\par \par Left Hip:\par Skin intact.\par No surgical scars.\par No erythema.\par No ecchymosis.\par No swelling.\par No deformity.\par Tenderness to greater trochanter and gluteal muscles.\par Unrestricted range of motion but Pain with terminal extremes. 5 degrees of internal rotation. 65-70 degrees of external rotation. \par No crepitation.\par No instability.\par ROBIN painful.\par Impingement (FADIR) painful.\par Stinchfield painful.\par Flexor power 5/5.\par \par Neurological: Intact distal crude touch sensation. Normal distal motor power.\par Cardiovascular: Palpable dorsalis pedis and posterior tibialis pulses. Brisk capillary refill. No peripheral edema.\par Lymphatics: No peripheral adenopathy appreciated. [de-identified] : X-ray imaging of the bilateral hips done here today demonstrates right hip with a well-fixed right THR in overall good alignment and left hip with minimal joint space narrowing.

## 2019-03-29 ENCOUNTER — APPOINTMENT (OUTPATIENT)
Dept: HEART AND VASCULAR | Facility: CLINIC | Age: 74
End: 2019-03-29
Payer: COMMERCIAL

## 2019-03-29 VITALS
BODY MASS INDEX: 27.65 KG/M2 | TEMPERATURE: 98 F | HEART RATE: 81 BPM | OXYGEN SATURATION: 98 % | HEIGHT: 65.35 IN | WEIGHT: 167.99 LBS | DIASTOLIC BLOOD PRESSURE: 66 MMHG | SYSTOLIC BLOOD PRESSURE: 150 MMHG

## 2019-03-29 DIAGNOSIS — Z78.9 OTHER SPECIFIED HEALTH STATUS: ICD-10-CM

## 2019-03-29 PROCEDURE — 36415 COLL VENOUS BLD VENIPUNCTURE: CPT

## 2019-03-29 PROCEDURE — 93000 ELECTROCARDIOGRAM COMPLETE: CPT

## 2019-03-29 PROCEDURE — 99214 OFFICE O/P EST MOD 30 MIN: CPT

## 2019-03-29 NOTE — REASON FOR VISIT
[Hypertension] : hypertension [Medication Management] : Medication management [FreeTextEntry1] : Last here 8/2016.  H/O HTN since 2005.  Did have syncope once on beta blockers with a PPM in place. Also had hypotension and syncope with Norvasc.\par \par PPM 2005, cath 2005 @ [facility], clean coronaries.\par h/o an "enlarged Heart" on CXR not confirmed by echo.\par \par EKG: NSR @ 62, low voltage, RSR' in V1, normal axis and intervals, no ST-Tw abnormalities. 3/29/19\par \par Saw Dr Craig for B/L hand tingling.  C spine dz, told to have a CT myelogram, narrowing at C5/6

## 2019-03-29 NOTE — PHYSICAL EXAM
[General Appearance - Well Developed] : well developed [Normal Appearance] : normal appearance [Well Groomed] : well groomed [General Appearance - Well Nourished] : well nourished [No Deformities] : no deformities [General Appearance - In No Acute Distress] : no acute distress [Normal Conjunctiva] : the conjunctiva exhibited no abnormalities [Eyelids - No Xanthelasma] : the eyelids demonstrated no xanthelasmas [Normal Oral Mucosa] : normal oral mucosa [No Oral Pallor] : no oral pallor [No Oral Cyanosis] : no oral cyanosis [Normal Jugular Venous A Waves Present] : normal jugular venous A waves present [Normal Jugular Venous V Waves Present] : normal jugular venous V waves present [No Jugular Venous Leo A Waves] : no jugular venous leo A waves [Respiration, Rhythm And Depth] : normal respiratory rhythm and effort [Exaggerated Use Of Accessory Muscles For Inspiration] : no accessory muscle use [Auscultation Breath Sounds / Voice Sounds] : lungs were clear to auscultation bilaterally [Heart Rate And Rhythm] : heart rate and rhythm were normal [Heart Sounds] : normal S1 and S2 [Murmurs] : no murmurs present [Abdomen Soft] : soft [Abdomen Tenderness] : non-tender [Abdomen Mass (___ Cm)] : no abdominal mass palpated [Abnormal Walk] : normal gait [Gait - Sufficient For Exercise Testing] : the gait was sufficient for exercise testing [Nail Clubbing] : no clubbing of the fingernails [Cyanosis, Localized] : no localized cyanosis [Petechial Hemorrhages (___cm)] : no petechial hemorrhages [Skin Color & Pigmentation] : normal skin color and pigmentation [] : no rash [No Venous Stasis] : no venous stasis [Skin Lesions] : no skin lesions [No Skin Ulcers] : no skin ulcer [No Xanthoma] : no  xanthoma was observed [Oriented To Time, Place, And Person] : oriented to person, place, and time [Affect] : the affect was normal [Mood] : the mood was normal [No Anxiety] : not feeling anxious [FreeTextEntry1] : mild cataracts

## 2019-03-29 NOTE — ASSESSMENT
[FreeTextEntry1] : HTN-  BP high here, increased Cardizem ER to 60 BID.   Also high with Dr Shrestha.  BP slightly up. No changes, meds renewed.  BP better due to pt taking Diltiazem BID as directed, not once daily\par \par Prediabetes-  Wt has been fluctuating.  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

## 2019-03-29 NOTE — HISTORY OF PRESENT ILLNESS
[FreeTextEntry1] : EP- [facility]\par GYN- Dr Kuhn\par PCP- Dr Shrestha\par Ophtho- Dr Tomy Randhawa\par Neuro- Dr Craig\par Neck- Dr Hurley\par Ortho- Dr Desai

## 2019-03-31 LAB
ALBUMIN SERPL ELPH-MCNC: 4.8 G/DL
ALP BLD-CCNC: 91 U/L
ALT SERPL-CCNC: 19 U/L
ANION GAP SERPL CALC-SCNC: 14 MMOL/L
AST SERPL-CCNC: 19 U/L
BASOPHILS # BLD AUTO: 0.05 K/UL
BASOPHILS NFR BLD AUTO: 1 %
BILIRUB SERPL-MCNC: 1.3 MG/DL
BUN SERPL-MCNC: 14 MG/DL
CALCIUM SERPL-MCNC: 10 MG/DL
CHLORIDE SERPL-SCNC: 102 MMOL/L
CHOLEST SERPL-MCNC: 209 MG/DL
CHOLEST/HDLC SERPL: 3.1 RATIO
CO2 SERPL-SCNC: 26 MMOL/L
CREAT SERPL-MCNC: 0.88 MG/DL
EOSINOPHIL # BLD AUTO: 0.12 K/UL
EOSINOPHIL NFR BLD AUTO: 2.3 %
ESTIMATED AVERAGE GLUCOSE: 128 MG/DL
GLUCOSE SERPL-MCNC: 98 MG/DL
HBA1C MFR BLD HPLC: 6.1 %
HCT VFR BLD CALC: 42.7 %
HDLC SERPL-MCNC: 68 MG/DL
HGB BLD-MCNC: 13.8 G/DL
IMM GRANULOCYTES NFR BLD AUTO: 0.2 %
LDLC SERPL CALC-MCNC: 122 MG/DL
LYMPHOCYTES # BLD AUTO: 1.63 K/UL
LYMPHOCYTES NFR BLD AUTO: 31.2 %
MAN DIFF?: NORMAL
MCHC RBC-ENTMCNC: 29.6 PG
MCHC RBC-ENTMCNC: 32.3 GM/DL
MCV RBC AUTO: 91.4 FL
MONOCYTES # BLD AUTO: 0.46 K/UL
MONOCYTES NFR BLD AUTO: 8.8 %
NEUTROPHILS # BLD AUTO: 2.95 K/UL
NEUTROPHILS NFR BLD AUTO: 56.5 %
PLATELET # BLD AUTO: 309 K/UL
POTASSIUM SERPL-SCNC: 4 MMOL/L
PROT SERPL-MCNC: 7 G/DL
RBC # BLD: 4.67 M/UL
RBC # FLD: 13.4 %
SODIUM SERPL-SCNC: 142 MMOL/L
TRIGL SERPL-MCNC: 96 MG/DL
TSH SERPL-ACNC: 1.51 UIU/ML
VIT B12 SERPL-MCNC: 1957 PG/ML
WBC # FLD AUTO: 5.22 K/UL

## 2019-04-30 ENCOUNTER — APPOINTMENT (OUTPATIENT)
Dept: ORTHOPEDIC SURGERY | Facility: CLINIC | Age: 74
End: 2019-04-30
Payer: COMMERCIAL

## 2019-04-30 PROCEDURE — 99213 OFFICE O/P EST LOW 20 MIN: CPT

## 2019-04-30 NOTE — PHYSICAL EXAM
[de-identified] : Spine: Physical Exam:\par \par General: patient is well developed, well nourished, in no acute \par distress, alert and oriented x 3. \par \par Mood and affect: normal\par \par Respiratory: no respiratory distress noted\par \par Skin: no scars over spine, skin intact, no erythema, increased warmth\par \par Alignment:The spine is well compensated in the coronal and sagittal plane. There is no asymmetry on Diaz Forward Bend Test.\par \par Gait: The patient has moderate difficulty with toe walk and heel walk. The patient is able to tandem walk without difficulty.\par \par Palpation: no tenderness to palpation midline along spine or paraspinal region\par \par Range of motion: Cervical ROM restricted, Lumbar spine ROM is full\par \par Neurologic Exam:\par Motor: Manual Muscle testing in the upper and lower extremities is 5 out of 5 in all muscle groups. There is no evidence of muscular atrophy in the upper extremities. Sensory: Sensation to light touch is grossly intact in the upper and lower extremities\par \par Reflexes: DTR are present and symmetric throughout, negative starr bilaterally, negative inverted radial reflex bilaterally, no clonus, plantar responses are flexor\par \par Special tests: + tinels right, + phalen left, Spurlings sign absent. Lhermitte's sign is absent. Straight Leg Raise Negative, Cross Straight Leg Raise Negative, ROBIN Test Negative\par \par Hip Exam: Full painless ROM of bilateral hips\par \par Vascular: Examination of the peripheral vascular system demonstrates no evidence of congestion or edema. no lymphedema bilateral lower extremities, pulses are present and symmetric in both lower extremities.  [de-identified] : XR cervical 8/15/18: moderate multilevel degenerative changes, no spondylolisthesis, no acute fractures\par \par CT 8/8/18: Severe bilateral C4/5 foraminal stenosis, with osseous fragment within the left neural foramen, moderate-severe multilevel bilateral foraminal stenosis; no central canal stenosis; no large disc herniations, no spondylolisthesis. \par \par CT myelogram 9/2018: severe left and mod to severe right C4/5 foraminal stenosis; C5/6 moderate central stenosis without signicant cord compression. Bilateral C5/6 and C6/7 foraminal stenosis. Extensive disc degeneration. \par \par

## 2019-04-30 NOTE — HISTORY OF PRESENT ILLNESS
[de-identified] : Follow Up 4/30/19: Patient still has neck pain that occasionally radiates to left arm, episodes last several minutes and resolve. Does not significantly affect daily activities, is able to exercise without restriction. Reports mild balance difficulty, denies problems with hand dexterity. \par \par Follow up 1/22/19: has axial neck pain. has radiation of to left shoulder and upper extremity for a few minutes at at time approx every other day. does not affect quality of life. working. no new weakness per patient. complains of left groin and left lateral thigh pain. does not radiate distal to knee. had R RUBIN in past and she feels this pain is similar.\par \par Follow up: has axial neck pain. radiation of neck pain to shoulder and extremities has resolved. no significant numbness/tingling. no bowel or bladder symtpoms. Had CT myelogram cervical and thoracic.\par \par Initial: Referred by Dr. Craig\par \par Ms. MARIEE is a very pleasant 72 year old female who complains of neck pain for years, atraumatic, worsening after fall last week. On initial presentation symptoms were as follows: Patient described the pain as constant. Patient rated the pain as 5/10 today, 6/10 average this week, 9/10 at its worst. The pain localized to neck, worse on left side. The pain radiates down both arms to all fingers. Patient has upper extremity paresthesias in the same distribution. The pain is relieved by lying down. The pain is worsened by activity. She has also noticed difficulty with balance in the past few years, progressing. Past treatments included pharmacologic management, with minimal temporary relief. The patient has not had physical therapy or steroid injections. Patient saw Dr. Craig yesterday, who ordered a CT myelogram cervical (patient has a pacemaker-2003).\par \par The patient reports loss of bilateral hand dexterity and  strength.\par The patient states she has had difficulty balance when walking for several years.\par There no sensory loss in the arms or legs\par The patent no difficulty with urination.\par \par The patient no history of previous spine surgery.\par The patient no previous spine consultation.\par \par Pain:\par Neck 40 Right Arm 30 Left Arm 30\par \par The patient has no history of unexpected weight loss, no history of active cancer, no history bladder or bowel dysfunction, no night pain, no fevers or chills.\par \par The past medical history, surgical history, family history, allergies, medications, review of systems, family history and social history were reviewed and non contributory.

## 2019-04-30 NOTE — DISCUSSION/SUMMARY
[de-identified] : Discussed the results of the patient's history, physical exam, and imaging. Ms. Corona's radicular pain has significantly improved. Has occasional episodes every few months that completely resolve after several minutes. She had no sign of myelopathy on examination today. I have counseled her against extension posturing of her neck as this has caused radicular symptoms in past. She will follow up with me in 2-3 months, sooner if there is an issue, and continue to follow up with Dr. Craig. All questions were answered. \par

## 2019-08-20 ENCOUNTER — APPOINTMENT (OUTPATIENT)
Dept: ORTHOPEDIC SURGERY | Facility: CLINIC | Age: 74
End: 2019-08-20

## 2019-08-21 ENCOUNTER — APPOINTMENT (OUTPATIENT)
Dept: ORTHOPEDIC SURGERY | Facility: CLINIC | Age: 74
End: 2019-08-21

## 2019-08-27 ENCOUNTER — APPOINTMENT (OUTPATIENT)
Dept: ORTHOPEDIC SURGERY | Facility: CLINIC | Age: 74
End: 2019-08-27

## 2019-09-04 ENCOUNTER — APPOINTMENT (OUTPATIENT)
Dept: ORTHOPEDIC SURGERY | Facility: CLINIC | Age: 74
End: 2019-09-04
Payer: COMMERCIAL

## 2019-09-04 PROCEDURE — 99213 OFFICE O/P EST LOW 20 MIN: CPT

## 2019-09-06 ENCOUNTER — EMERGENCY (EMERGENCY)
Facility: HOSPITAL | Age: 74
LOS: 1 days | Discharge: HOME | End: 2019-09-06
Attending: EMERGENCY MEDICINE | Admitting: EMERGENCY MEDICINE
Payer: COMMERCIAL

## 2019-09-06 VITALS
DIASTOLIC BLOOD PRESSURE: 89 MMHG | SYSTOLIC BLOOD PRESSURE: 143 MMHG | HEART RATE: 62 BPM | OXYGEN SATURATION: 96 % | RESPIRATION RATE: 18 BRPM

## 2019-09-06 VITALS
HEART RATE: 68 BPM | TEMPERATURE: 98 F | SYSTOLIC BLOOD PRESSURE: 184 MMHG | OXYGEN SATURATION: 98 % | RESPIRATION RATE: 16 BRPM | DIASTOLIC BLOOD PRESSURE: 103 MMHG

## 2019-09-06 DIAGNOSIS — Z95.0 PRESENCE OF CARDIAC PACEMAKER: Chronic | ICD-10-CM

## 2019-09-06 DIAGNOSIS — Z96.641 PRESENCE OF RIGHT ARTIFICIAL HIP JOINT: Chronic | ICD-10-CM

## 2019-09-06 LAB
ALBUMIN SERPL ELPH-MCNC: 4.3 G/DL — SIGNIFICANT CHANGE UP (ref 3.3–5)
ALP SERPL-CCNC: 111 U/L — SIGNIFICANT CHANGE UP (ref 40–120)
ALT FLD-CCNC: 33 U/L — SIGNIFICANT CHANGE UP (ref 10–45)
ANION GAP SERPL CALC-SCNC: 10 MMOL/L — SIGNIFICANT CHANGE UP (ref 5–17)
APTT BLD: 33.3 SEC — SIGNIFICANT CHANGE UP (ref 27.5–36.3)
AST SERPL-CCNC: 27 U/L — SIGNIFICANT CHANGE UP (ref 10–40)
BASOPHILS # BLD AUTO: 0.04 K/UL — SIGNIFICANT CHANGE UP (ref 0–0.2)
BASOPHILS NFR BLD AUTO: 0.8 % — SIGNIFICANT CHANGE UP (ref 0–2)
BILIRUB SERPL-MCNC: 0.7 MG/DL — SIGNIFICANT CHANGE UP (ref 0.2–1.2)
BLD GP AB SCN SERPL QL: NEGATIVE — SIGNIFICANT CHANGE UP
BUN SERPL-MCNC: 14 MG/DL — SIGNIFICANT CHANGE UP (ref 7–23)
CALCIUM SERPL-MCNC: 9.3 MG/DL — SIGNIFICANT CHANGE UP (ref 8.4–10.5)
CHLORIDE SERPL-SCNC: 103 MMOL/L — SIGNIFICANT CHANGE UP (ref 96–108)
CO2 SERPL-SCNC: 28 MMOL/L — SIGNIFICANT CHANGE UP (ref 22–31)
CREAT SERPL-MCNC: 0.7 MG/DL — SIGNIFICANT CHANGE UP (ref 0.5–1.3)
EOSINOPHIL # BLD AUTO: 0.24 K/UL — SIGNIFICANT CHANGE UP (ref 0–0.5)
EOSINOPHIL NFR BLD AUTO: 4.6 % — SIGNIFICANT CHANGE UP (ref 0–6)
GLUCOSE SERPL-MCNC: 121 MG/DL — HIGH (ref 70–99)
HCT VFR BLD CALC: 41.8 % — SIGNIFICANT CHANGE UP (ref 34.5–45)
HGB BLD-MCNC: 13.8 G/DL — SIGNIFICANT CHANGE UP (ref 11.5–15.5)
IMM GRANULOCYTES NFR BLD AUTO: 0.2 % — SIGNIFICANT CHANGE UP (ref 0–1.5)
INR BLD: 0.97 — SIGNIFICANT CHANGE UP (ref 0.88–1.16)
LYMPHOCYTES # BLD AUTO: 1.59 K/UL — SIGNIFICANT CHANGE UP (ref 1–3.3)
LYMPHOCYTES # BLD AUTO: 30.6 % — SIGNIFICANT CHANGE UP (ref 13–44)
MCHC RBC-ENTMCNC: 29.9 PG — SIGNIFICANT CHANGE UP (ref 27–34)
MCHC RBC-ENTMCNC: 33 GM/DL — SIGNIFICANT CHANGE UP (ref 32–36)
MCV RBC AUTO: 90.5 FL — SIGNIFICANT CHANGE UP (ref 80–100)
MONOCYTES # BLD AUTO: 0.57 K/UL — SIGNIFICANT CHANGE UP (ref 0–0.9)
MONOCYTES NFR BLD AUTO: 11 % — SIGNIFICANT CHANGE UP (ref 2–14)
NEUTROPHILS # BLD AUTO: 2.75 K/UL — SIGNIFICANT CHANGE UP (ref 1.8–7.4)
NEUTROPHILS NFR BLD AUTO: 52.8 % — SIGNIFICANT CHANGE UP (ref 43–77)
NRBC # BLD: 0 /100 WBCS — SIGNIFICANT CHANGE UP (ref 0–0)
PLATELET # BLD AUTO: 254 K/UL — SIGNIFICANT CHANGE UP (ref 150–400)
POTASSIUM SERPL-MCNC: 3.9 MMOL/L — SIGNIFICANT CHANGE UP (ref 3.5–5.3)
POTASSIUM SERPL-SCNC: 3.9 MMOL/L — SIGNIFICANT CHANGE UP (ref 3.5–5.3)
PROT SERPL-MCNC: 6.9 G/DL — SIGNIFICANT CHANGE UP (ref 6–8.3)
PROTHROM AB SERPL-ACNC: 10.9 SEC — SIGNIFICANT CHANGE UP (ref 10–12.9)
RBC # BLD: 4.62 M/UL — SIGNIFICANT CHANGE UP (ref 3.8–5.2)
RBC # FLD: 12.5 % — SIGNIFICANT CHANGE UP (ref 10.3–14.5)
RH IG SCN BLD-IMP: POSITIVE — SIGNIFICANT CHANGE UP
SODIUM SERPL-SCNC: 141 MMOL/L — SIGNIFICANT CHANGE UP (ref 135–145)
WBC # BLD: 5.2 K/UL — SIGNIFICANT CHANGE UP (ref 3.8–10.5)
WBC # FLD AUTO: 5.2 K/UL — SIGNIFICANT CHANGE UP (ref 3.8–10.5)

## 2019-09-06 PROCEDURE — 70496 CT ANGIOGRAPHY HEAD: CPT | Mod: 26

## 2019-09-06 PROCEDURE — 36415 COLL VENOUS BLD VENIPUNCTURE: CPT

## 2019-09-06 PROCEDURE — 70498 CT ANGIOGRAPHY NECK: CPT

## 2019-09-06 PROCEDURE — 85025 COMPLETE CBC W/AUTO DIFF WBC: CPT

## 2019-09-06 PROCEDURE — 99285 EMERGENCY DEPT VISIT HI MDM: CPT | Mod: 25

## 2019-09-06 PROCEDURE — 96374 THER/PROPH/DIAG INJ IV PUSH: CPT | Mod: XU

## 2019-09-06 PROCEDURE — 99284 EMERGENCY DEPT VISIT MOD MDM: CPT

## 2019-09-06 PROCEDURE — 86850 RBC ANTIBODY SCREEN: CPT

## 2019-09-06 PROCEDURE — 72052 X-RAY EXAM NECK SPINE 6/>VWS: CPT | Mod: 26

## 2019-09-06 PROCEDURE — 70450 CT HEAD/BRAIN W/O DYE: CPT

## 2019-09-06 PROCEDURE — 86900 BLOOD TYPING SEROLOGIC ABO: CPT

## 2019-09-06 PROCEDURE — 82962 GLUCOSE BLOOD TEST: CPT

## 2019-09-06 PROCEDURE — 70498 CT ANGIOGRAPHY NECK: CPT | Mod: 26

## 2019-09-06 PROCEDURE — 85610 PROTHROMBIN TIME: CPT

## 2019-09-06 PROCEDURE — 86901 BLOOD TYPING SEROLOGIC RH(D): CPT

## 2019-09-06 PROCEDURE — 72052 X-RAY EXAM NECK SPINE 6/>VWS: CPT

## 2019-09-06 PROCEDURE — 70496 CT ANGIOGRAPHY HEAD: CPT

## 2019-09-06 PROCEDURE — 93005 ELECTROCARDIOGRAM TRACING: CPT

## 2019-09-06 PROCEDURE — 93010 ELECTROCARDIOGRAM REPORT: CPT

## 2019-09-06 PROCEDURE — 85730 THROMBOPLASTIN TIME PARTIAL: CPT

## 2019-09-06 PROCEDURE — 80053 COMPREHEN METABOLIC PANEL: CPT

## 2019-09-06 PROCEDURE — 70450 CT HEAD/BRAIN W/O DYE: CPT | Mod: 26,59

## 2019-09-06 RX ORDER — KETOROLAC TROMETHAMINE 30 MG/ML
15 SYRINGE (ML) INJECTION ONCE
Refills: 0 | Status: DISCONTINUED | OUTPATIENT
Start: 2019-09-06 | End: 2019-09-06

## 2019-09-06 RX ADMIN — Medication 15 MILLIGRAM(S): at 13:34

## 2019-09-06 NOTE — ED PROVIDER NOTE - MUSCULOSKELETAL, MLM
Spine appears normal, range of motion is not limited, no muscle or joint tenderness. No midline C-spine tenderness.

## 2019-09-06 NOTE — CONSULT NOTE ADULT - SUBJECTIVE AND OBJECTIVE BOX
**STROKE CODE CONSULT NOTE**    Last known well time/Time of onset of symptoms: 8 am    HPI:  73y Female w/ PMH HTN, HLD, PPM placement, chronic neck pain, coming in with generalized weakness. Pt states that she arrived to work and was okay, and she felt some neck pain and arm tingling on both arms (similar to episodes she has had in the past with her chronic neck pain). The arm tingling lasted longer than usual, and then she also felt like her legs were heavy so she decided to come to the ER. She denies any unilateral weakness, numbness, speech problems, vision problems. She states she feels a headache and she is lightheaded, though it was getting better.     Pt states that she has had a recent cold, starting last week and this week she feels is slightly worse with a cough. States she has had subjective chills but did not take her temperature to assess for fever.     In the ER, BP elevated to 186/91. Pt states she did take her medication this morning. CT head negative for acute event. Pt states symptoms started to resolve after CT scan.     PAST MEDICAL & SURGICAL HISTORY:  Cyst of right ovary: elevated CEA  Pacemaker: for supraventricular beats  Coronary artery disease without angina pectoris, unspecified vessel or lesion type, unspecified whether native or transplanted heart  Asthma: Asthma  Essential hypertension: Hypertension  History of right hip replacement: right total hip replacement  2014  Artificial pacemaker: July 2005  Presence of cardiac pacemaker: Pacemaker  Open fracture of neck of femur: Hip fracture requiring operative repair, right, open type I or II, initial encounter  2003      FAMILY HISTORY:      SOCIAL HISTORY:  Denies smoking, drinking, or drug use    ROS:  Constitutional: No fever, weight loss or fatigue  Eyes: No eye pain, visual disturbances, or discharge  ENMT:  No difficulty hearing, tinnitus, vertigo; No sinus or throat pain  Neck: No pain or stiffness  Respiratory: No cough, wheezing, chills or hemoptysis  Cardiovascular: No chest pain, palpitations, shortness of breath, dizziness or leg swelling  Gastrointestinal: No abdominal pain. No nausea, vomiting or hematemesis; No diarrhea or constipation. Nohematochezia.  Genitourinary: No dysuria, frequency, hematuria or incontinence  Neurological: As per HPI      MEDICATIONS  (STANDING):    MEDICATIONS  (PRN):      Allergies    Norvasc (Rash)  penicillin (Anaphylaxis)  Zithromax (Short breath)    Intolerances    latex (Urticaria; Rash; Hives)      Vital Signs Last 24 Hrs  T(C): 36.4 (06 Sep 2019 08:33), Max: 36.4 (06 Sep 2019 08:33)  T(F): 97.6 (06 Sep 2019 08:33), Max: 97.6 (06 Sep 2019 08:33)  HR: 68 (06 Sep 2019 08:33) (68 - 68)  BP: 184/103 (06 Sep 2019 08:33) (184/103 - 184/103)  BP(mean): --  RR: 16 (06 Sep 2019 08:33) (16 - 16)  SpO2: 98% (06 Sep 2019 08:33) (98% - 98%)    PHYSICAL EXAM:  Constitutional: WDWN; NAD    Neurologic:  -Mental status: Awake, alert and oriented to person, age, and month. Speech is fluent with intact naming, able to describe picture in full.  Recent and remote memory intact.  Attention/concentration intact.  No dysarthria, no aphasia.  Fund of knowledge appropriate.  Slightly hoarse voice  -Cranial nerves:  II: Visual fields full to confrontation. Pupils PERRL  III, IV, VI: extraocular movements are intact without nystagmus  V: Facial sensation intact V1 through V3 intact bilaterally  VII: Face is symmetric with normal eye closure and smile.  VIII: hearing is intact to finger rub  IX, X: uvula is midline and soft palate rises symmetrically  XI: Head turning and shoulder shrug intact  XII: Tongue protrudes in the midline    -Motor:  Normal bulk and tone, strength 5/5 in bilateral upper and lower extremities.   strength 5/5.    -Sensation: Intact to light touch.  No neglect.   -Coordination: No dysmetria on finger-to-nose and heel-to-shin.  No clumsiness.  -Reflexes:downgoing toes bilaterally  -Gait: Narrow and steady. No ataxia.  Romberg negative    NIHSS: 0    Fingerstick Blood Glucose: CAPILLARY BLOOD GLUCOSE  114 (06 Sep 2019 08:52)      POCT Blood Glucose.: 114 mg/dL (06 Sep 2019 08:36)       LABS:                    RADIOLOGY & ADDITIONAL STUDIES:        IV-tPA (Y/N):             N                        Reason IV-tPA not given: mild resolving deficits    ASSESSMENT/PLAN:    73y Female w/ PMH HTN, HLD, PPM placement, chronic neck pain, coming in with generalized weakness, arm tingling, worsening neck pain, and headache.     -CT head negative for acute event  -No neurologic deficits  -Possibly from cervical spine issues - recommend checking with outpatient doctor  -Care and dispo per ED

## 2019-09-06 NOTE — ED PROVIDER NOTE - NSFOLLOWUPINSTRUCTIONS_ED_ALL_ED_FT
Follow up with Dr. Hurley for re-evaluation and to be arranged for a ct myelogram. Return to er for any new or worsening symptoms.

## 2019-09-06 NOTE — ED ADULT NURSE NOTE - CHPI ED NUR SYMPTOMS NEG
no blurred vision/no loss of consciousness/no confusion/no fever/no change in level of consciousness/no vomiting

## 2019-09-06 NOTE — ED PROVIDER NOTE - NEUROLOGICAL, MLM
AOx3, CN2-12 intact, equal b/l. Motor and sensations intact b/l, FTN HTS, CAROL ANN all within nml limits. Negative pronator drift. Gait non-ataxic.

## 2019-09-06 NOTE — ED ADULT TRIAGE NOTE - CHIEF COMPLAINT QUOTE
pt c/o having a pain to her neck that radiating down both arms, after she became lightheaded & having dizziness affecting her gait.

## 2019-09-06 NOTE — ED PROVIDER NOTE - CLINICAL SUMMARY MEDICAL DECISION MAKING FREE TEXT BOX
74 y/o F with PMHx of HTN, HLD, CAD, pacemaker for supraventricular beats and asthma presents to the ED for acute onset of neck pain radiating to b/l arms and head with associated dizziness, b/l arm numbness, and generalized weakness. Pt hypertensive on arrival. Stroke code activated at triage. Pt evaluated by myself and stroke PA; on exam, symptoms not consistent with stroke. CT head negative for bleed or infarct. Remainder of stroke workup canceled due to low suspicion for CVA. Will arrange for CTA head and neck to r/o dissection. ?Symptoms secondary to cervical radiculopathy. Case discussed with Ortho, requesting C-spine X-rays. Will continue to monitor. 74 y/o F with PMHx of HTN, HLD, CAD, pacemaker for supraventricular beats and asthma presents to the ED for acute onset of neck pain radiating to b/l arms and head with associated dizziness, b/l arm numbness, and generalized weakness. Pt hypertensive on arrival. Stroke code activated at triage. Pt evaluated by myself and stroke PA; symptoms and pe findings not consistent with stroke. CT head negative for bleed or infarct. Remainder of stroke workup canceled due to low suspicion for CVA. Will arrange for CTA head and neck to r/o dissection. ?Symptoms secondary to cervical radiculopathy. Case discussed with Ortho, requesting C-spine X-rays. Will continue to monitor.    CTA head/ neck neg for dissection/ stenosis. Xrays of c-spine showing djd. Sx's much improved and almost all resolved without tx in ed. Pt seen by ortho; suspect cervical etiology for sx's. Do not suspect cord compression. No indications for medrol dose pack at this time as sxs now resolved. As pt cannot undergo mri due to pm, pt to be arranged for ct myelogram as outpt. ED evaluation and management discussed with the patient in detail.  Close ortho follow up encouraged.  Strict ED return instructions discussed in detail and patient given the opportunity to ask any questions about their discharge diagnosis and instructions. Patient verbalized understanding.

## 2019-09-06 NOTE — CONSULT NOTE ADULT - SUBJECTIVE AND OBJECTIVE BOX
Pt Name: DAMIAN MARIEE  MRN: [id number]      Pt is a 72yo Female c/o a near syncopal episode with associated neck pain, HA, dizziness, numbness and tingling to bilateral arms and weakness of bilateral legs after getting off elevator this morning. Pt states she has been having constant neck pain and intermittent numbness and tingling to bilateral arms for 1 year. Pt notes that today was the first time these episodes occurred with HA, dizziness and weakness of bilateral legs. States todays symptoms lasted longer and were more severe than usual. Patient states at the time of examination much of her symptoms have resolved with a slight headache/dizzness, neck pain and b/l arm numbness. Pt has been following up with her orthopedist Dr. Hurley. Pt has not been taking any medication for the pain. Of note, pt admits to having cold symptoms for past 2 days. Denies fever, chills, headache, head trauma. changes in vision, LOC, SOB, CP, hx of stroke, hx of seizures.        ROS is otherwise negative.    PAST MEDICAL & SURGICAL HISTORY:  Cyst of right ovary: elevated CEA  Pacemaker: for supraventricular beats  Coronary artery disease without angina pectoris, unspecified vessel or lesion type, unspecified whether native or transplanted heart  Asthma: Asthma  Essential hypertension: Hypertension  History of right hip replacement: right total hip replacement  2014  Artificial pacemaker: July 2005  Presence of cardiac pacemaker: Pacemaker  Open fracture of neck of femur: Hip fracture requiring operative repair, right, open type I or II, initial encounter  2003      Allergies    Norvasc (Rash)  penicillin (Anaphylaxis)  Zithromax (Short breath)    Intolerances    latex (Urticaria; Rash; Hives)          Medications:      Social History:  Ambulation: Walking independently [X]   Pt denies smoking, drinking EtOH, or using any illicit drugs.     PHYSICAL EXAM:    T(C): 36.4 (09-06-19 @ 08:33), Max: 36.4 (09-06-19 @ 08:33)  HR: 68 (09-06-19 @ 08:33) (68 - 68)  BP: 184/103 (09-06-19 @ 08:33) (184/103 - 184/103)  RR: 16 (09-06-19 @ 08:33) (16 - 16)  SpO2: 98% (09-06-19 @ 08:33) (98% - 98%)  Wt(kg): --    Gen: No acute distress. Lying comfortably on stretcher  Neurological: AAOx3. no focal deficits. PERRL, EOMI   Skin: no rash on visible skin  Rectal:  Musculoskeletal:  Spine: No tenderness to palpation of cervical spine, ROM intact of neck  Respiratory: Normal effort on room air  Cardiac: no cyanosis, no peripheral edema, peripheral pulses intact  Gait: Normal gait  5/5 strength of /FINGER INT  5/5 strength of WRIST FLEX/EXT  5/5 strength of BICEPS/TRICEPS/DELTS  2+ BRACHIAL/RADIAL PULSES  5/5 strength of QUAD/HAM/EHL/FHL/TA/GS  DP/PT 2+        Imaging Studies:    A/P:  Pt is a 73yyo Female presents with cervical neck pain with radiculopathy.   -  -  - Pt Name: DAMIAN MARIEE  MRN: [id number]      Pt is a 74yo Female c/o a near syncopal episode with associated neck pain, HA, dizziness, numbness and tingling to bilateral arms and weakness of bilateral legs after getting off elevator this morning. Pt states she has been having constant neck pain and intermittent numbness and tingling to bilateral arms for 1 year. Pt notes that today was the first time these episodes occurred with HA, dizziness and weakness of bilateral legs. States todays symptoms lasted longer and were more severe than usual. Patient states at the time of examination much of her symptoms have resolved with a slight headache/dizzness, neck pain and b/l arm numbness. Pt has been following up with her orthopedist Dr. Hurley. Pt has not been taking any medication for the pain. Of note, pt admits to having cold symptoms for past 2 days. Denies fever, chills, headache, head trauma. changes in vision, LOC, SOB, CP, hx of stroke, hx of seizures.        ROS is otherwise negative.    PAST MEDICAL & SURGICAL HISTORY:  Cyst of right ovary: elevated CEA  Pacemaker: for supraventricular beats  Coronary artery disease without angina pectoris, unspecified vessel or lesion type, unspecified whether native or transplanted heart  Asthma: Asthma  Essential hypertension: Hypertension  History of right hip replacement: right total hip replacement  2014  Artificial pacemaker: July 2005  Presence of cardiac pacemaker: Pacemaker  Open fracture of neck of femur: Hip fracture requiring operative repair, right, open type I or II, initial encounter  2003      Allergies    Norvasc (Rash)  penicillin (Anaphylaxis)  Zithromax (Short breath)    Intolerances    latex (Urticaria; Rash; Hives)          Medications:      Social History:  Ambulation: Walking independently [X]   Pt denies smoking, drinking EtOH, or using any illicit drugs.     PHYSICAL EXAM:    T(C): 36.4 (09-06-19 @ 08:33), Max: 36.4 (09-06-19 @ 08:33)  HR: 68 (09-06-19 @ 08:33) (68 - 68)  BP: 184/103 (09-06-19 @ 08:33) (184/103 - 184/103)  RR: 16 (09-06-19 @ 08:33) (16 - 16)  SpO2: 98% (09-06-19 @ 08:33) (98% - 98%)  Wt(kg): --    Gen: No acute distress. Lying comfortably on stretcher  Neurological: AAOx3. no focal deficits. PERRL, EOMI   Skin: no rash on visible skin  Rectal:  Musculoskeletal:  Spine: No tenderness to palpation of cervical spine, ROM intact of neck  Respiratory: Normal effort on room air  Cardiac: no cyanosis, no peripheral edema, peripheral pulses intact  Gait: Normal gait  5/5 strength of /FINGER INT  5/5 strength of WRIST FLEX/EXT  5/5 strength of BICEPS/TRICEPS/DELTS  2+ BRACHIAL/RADIAL PULSES  5/5 strength of QUAD/HAM/EHL/FHL/TA/GS  DP/PT 2+        Imaging Studies: Xray of cervical spine showing arthritic changes, narrowing of joint space. no dislocation seen. No fracture seen on XR  CT myleogram from sept 2018 showed C5-6 moderate Left and mild Right cord compromise due to moderate central stenosis caused by Disc bulge and posterior osteophyte. Remainder of cord normal. Modeate -severe osteophyte foraminal stenosis C2-7.     A/P:  Pt is a 73yyo Female presents with cervical neck pain with radiculopathy.   - Patient seen and examined  - Patient to use pain meds as needed  - No orthopedic surgical intervention at this time  - Patient to get a CT myelogram as an outpatient to be set up by Dr. Hurley's office and then follow up with Dr. Hurley  - Discussed plan with Dr. Hurley team and agrees with plan Pt Name: DAMIAN MARIEE  MRN: [id number]      Pt is a 74yo Female c/o a near syncopal episode with associated neck pain, HA, dizziness, numbness and tingling to bilateral arms and weakness of bilateral legs after getting off elevator this morning. Pt states she has been having constant neck pain and intermittent numbness and tingling to bilateral arms for 1 year. Pt notes that today was the first time these episodes occurred with HA, dizziness and weakness of bilateral legs. States todays symptoms lasted longer and were more severe than usual. Patient states at the time of examination much of her symptoms have resolved with a slight headache/dizzness, neck pain and b/l arm numbness. Pt has been following up with her orthopedist Dr. Hurley. Pt has not been taking any medication for the pain. Of note, pt admits to having cold symptoms for past 2 days. Denies fever, chills, headache, head trauma. changes in vision, LOC, SOB, CP, hx of stroke, hx of seizures.        ROS is otherwise negative.    PAST MEDICAL & SURGICAL HISTORY:  Cyst of right ovary: elevated CEA  Pacemaker: for supraventricular beats  Coronary artery disease without angina pectoris, unspecified vessel or lesion type, unspecified whether native or transplanted heart  Asthma: Asthma  Essential hypertension: Hypertension  History of right hip replacement: right total hip replacement  2014  Artificial pacemaker: July 2005  Presence of cardiac pacemaker: Pacemaker  Open fracture of neck of femur: Hip fracture requiring operative repair, right, open type I or II, initial encounter  2003      Allergies    Norvasc (Rash)  penicillin (Anaphylaxis)  Zithromax (Short breath)    Intolerances    latex (Urticaria; Rash; Hives)          Medications:      Social History:  Ambulation: Walking independently [X]   Pt denies smoking, drinking EtOH, or using any illicit drugs.     PHYSICAL EXAM:    T(C): 36.4 (09-06-19 @ 08:33), Max: 36.4 (09-06-19 @ 08:33)  HR: 68 (09-06-19 @ 08:33) (68 - 68)  BP: 184/103 (09-06-19 @ 08:33) (184/103 - 184/103)  RR: 16 (09-06-19 @ 08:33) (16 - 16)  SpO2: 98% (09-06-19 @ 08:33) (98% - 98%)  Wt(kg): --    Gen: No acute distress. Lying comfortably on stretcher  Neurological: AAOx3. no focal deficits. PERRL, EOMI   Skin: no rash on visible skin  Rectal:  Musculoskeletal:  Spine: No tenderness to palpation of cervical spine, ROM intact of neck  Respiratory: Normal effort on room air  Cardiac: no cyanosis, no peripheral edema, peripheral pulses intact  Gait: Normal gait  5/5 strength of /FINGER INT  5/5 strength of WRIST FLEX/EXT  5/5 strength of BICEPS/TRICEPS/DELTS  2+ BRACHIAL/RADIAL PULSES  5/5 strength of QUAD/HAM/EHL/FHL/TA/GS  DP/PT 2+        Imaging Studies: Xray of cervical spine showing arthritic changes, narrowing of joint space. no dislocation seen. No fracture seen on XR  CT myleogram from sept 2018 showed C5-6 moderate Left and mild Right cord compromise due to moderate central stenosis caused by Disc bulge and posterior osteophyte. Remainder of cord normal. Modeate -severe osteophyte foraminal stenosis C2-7.     A/P:  Pt is a 73yyo Female presents with cervical neck pain with radiculopathy with symptoms resolving and no deficit  - Patient seen and examined  - Patient to use pain meds as needed  - No orthopedic surgical intervention at this time  - Patient to get a CT myelogram as an outpatient to be set up by Dr. Hurley's office and then follow up with Dr. Hurley  - Discussed plan with Dr. Hurley team and agrees with plan Pt Name: DAMIAN MARIEE  MRN: [id number]      Pt is a 72yo Female c/o a near syncopal episode with associated neck pain, HA, dizziness, numbness and tingling to bilateral arms and weakness of bilateral legs after getting off elevator this morning. Pt states she has been having constant neck pain and intermittent numbness and tingling to bilateral arms for 1 year. Pt notes that today was the first time these episodes occurred with HA, dizziness and weakness of bilateral legs. States todays symptoms lasted longer and were more severe than usual. Patient states at the time of examination much of her symptoms have resolved with a slight headache/dizzness, neck pain and b/l arm numbness. Pt has been following up with her orthopedist Dr. Hurley. Pt has not been taking any medication for the pain. Of note, pt admits to having cold symptoms for past 2 days. Denies fever, chills, headache, head trauma. changes in vision, LOC, SOB, CP, hx of stroke, hx of seizures.        ROS is otherwise negative.    PAST MEDICAL & SURGICAL HISTORY:  Cyst of right ovary: elevated CEA  Pacemaker: for supraventricular beats  Coronary artery disease without angina pectoris, unspecified vessel or lesion type, unspecified whether native or transplanted heart  Asthma: Asthma  Essential hypertension: Hypertension  History of right hip replacement: right total hip replacement  2014  Artificial pacemaker: July 2005  Presence of cardiac pacemaker: Pacemaker  Open fracture of neck of femur: Hip fracture requiring operative repair, right, open type I or II, initial encounter  2003      Allergies    Norvasc (Rash)  penicillin (Anaphylaxis)  Zithromax (Short breath)    Intolerances    latex (Urticaria; Rash; Hives)          Medications:      Social History:  Ambulation: Walking independently [X]   Pt denies smoking, drinking EtOH, or using any illicit drugs.     PHYSICAL EXAM:    T(C): 36.4 (09-06-19 @ 08:33), Max: 36.4 (09-06-19 @ 08:33)  HR: 68 (09-06-19 @ 08:33) (68 - 68)  BP: 184/103 (09-06-19 @ 08:33) (184/103 - 184/103)  RR: 16 (09-06-19 @ 08:33) (16 - 16)  SpO2: 98% (09-06-19 @ 08:33) (98% - 98%)  Wt(kg): --    Gen: No acute distress. Lying comfortably on stretcher  Neurological: AAOx3. no focal deficits. PERRL, EOMI   Skin: no rash on visible skin  Rectal:  Musculoskeletal:  Spine: No tenderness to palpation of cervical spine, ROM intact of neck  Respiratory: Normal effort on room air  Cardiac: no cyanosis, no peripheral edema, peripheral pulses intact  Gait: Normal gait  5/5 strength of /FINGER INT  5/5 strength of WRIST FLEX/EXT  5/5 strength of BICEPS/TRICEPS/DELTS  2+ BRACHIAL/RADIAL PULSES  5/5 strength of QUAD/HAM/EHL/FHL/TA/GS  DP/PT 2+        Imaging Studies: Xray of cervical spine showing arthritic changes, narrowing of joint space. no dislocation seen. No fracture seen on XR  CT myleogram from sept 2018 showed C5-6 moderate Left and mild Right cord compromise due to moderate central stenosis caused by Disc bulge and posterior osteophyte. Remainder of cord normal. Modeate -severe osteophyte foraminal stenosis C2-7.     A/P:  Pt is a 73yyo Female presents with cervical neck pain with radiculopathy with symptoms resolving and no deficit  - Patient seen and examined  - Patient to use pain meds as needed  - No orthopedic surgical intervention at this time  - Follow up with PCP and cardiologist   - Patient to get a CT myelogram as an outpatient to be set up by Dr. Hurley's office and then follow up with Dr. Hurley  - Discussed plan with Dr. Hurley team and agrees with plan

## 2019-09-06 NOTE — ED PROVIDER NOTE - CARE PROVIDER_API CALL
Mati Hurley)  Orthopedics  [address] [address]  Phone: [phone]  Fax: [phone]  Follow Up Time:

## 2019-09-06 NOTE — ED PROVIDER NOTE - PATIENT PORTAL LINK FT
You can access the FollowMyHealth Patient Portal offered by [facility] by registering at the following website: [email or web address] By joining [employer]’s FollowMyHealth portal, you will also be able to view your health information using other applications (apps) compatible with our system.

## 2019-09-06 NOTE — ED PROVIDER NOTE - PHYSICAL EXAMINATION
VITAL SIGNS: I have reviewed nursing notes and confirm.  CONSTITUTIONAL: Well-developed; well-nourished; in no acute distress.   SKIN:  warm and dry, no acute rash.   HEAD:  normocephalic, atraumatic.  EYES: PERRL, EOM intact; conjunctiva and sclera clear.  ENT: No nasal discharge; airway clear.   NECK: Supple; non tender.  CARD: S1, S2 normal; no murmurs, gallops, or rubs. Regular rate and rhythm.   RESP:  Clear to auscultation b/l, no wheezes, rales or rhonchi.  ABD: Normal bowel sounds; soft; non-distended; non-tender; no guarding/ rebound.  EXT: Normal ROM. No clubbing, cyanosis or edema. 2+ pulses to b/l ue/le.  NEURO: AOx3, CN2-12 intact, equal b/l. Motor and sensations intact b/l, FTN HTS, CAROL ANN all within nml limits. Negative pronator drift. Gait non-ataxic.  PSYCH: Cooperative, mood and affect appropriate. VITAL SIGNS: I have reviewed nursing notes and confirm.  CONSTITUTIONAL: Well-developed; well-nourished; in no acute distress.   SKIN:  warm and dry, no acute rash.   HEAD:  normocephalic, atraumatic.  EYES: PERRL, EOM intact; conjunctiva and sclera clear.  ENT: No nasal discharge; airway clear.   NECK: Supple; non tender. No C-spine midline tenderness.   CARD: S1, S2 normal; no murmurs, gallops, or rubs. Regular rate and rhythm.   RESP:  Clear to auscultation b/l, no wheezes, rales or rhonchi.  ABD: Normal bowel sounds; soft; non-distended; non-tender; no guarding/ rebound.  EXT: Normal ROM. No clubbing, cyanosis or edema. 2+ pulses to b/l ue/le.  NEURO: AOx3, CN2-12 intact, equal b/l. Motor and sensations intact b/l, FTN HTS, CAROL ANN all within nml limits. Negative pronator drift. Gait non-ataxic.  PSYCH: Cooperative, mood and affect appropriate.

## 2019-09-06 NOTE — ED PROVIDER NOTE - OBJECTIVE STATEMENT
74 y/o F with PMHx of HTN, HLD, CAD, pacemaker for supraventricular beats and asthma who presents to the ED with complaints of neck pain radiating to b/l arms and her head, with associated numbness of b/l arms, dizziness, and generalized weakness. Symptoms began shortly after 8:00AM while pt was at work when she noted difficulty walking due to b/l leg weakness. Symptoms are currently improving in the ED. Pt describes dizziness as a lightheaded sensation, worsened with movement. Pt has hx of chronic neck pain and was last seen by spine surgeon last week for neck pain. Stroke code activated at triage.

## 2019-09-06 NOTE — ED ADULT NURSE NOTE - OBJECTIVE STATEMENT
c/o sudden onset of neck pain radiating to BUE and BLE with reported lightheadedness and quotes "feeling like I'm about to pass out." Pt reported paresthesia to BUE and BLE, with arms>legs. Also reported generalized weakness. Pt quotes "I have narrowing to C5." Reported had increasing episodes of neck pain with numbness, last episode was last week. Pt denies chest pain or sob. Denies unilateral weakness.

## 2019-09-11 DIAGNOSIS — M54.2 CERVICALGIA: ICD-10-CM

## 2019-09-11 DIAGNOSIS — R20.0 ANESTHESIA OF SKIN: ICD-10-CM

## 2019-09-11 DIAGNOSIS — R42 DIZZINESS AND GIDDINESS: ICD-10-CM

## 2019-10-02 ENCOUNTER — RX RENEWAL (OUTPATIENT)
Age: 74
End: 2019-10-02

## 2019-10-02 NOTE — PHYSICAL EXAM
[de-identified] : Spine: Physical Exam:\par \par General: patient is well developed, well nourished, in no acute \par distress, alert and oriented x 3. \par \par Mood and affect: normal\par \par Respiratory: no respiratory distress noted\par \par Skin: no scars over spine, skin intact, no erythema, increased warmth\par \par Alignment:The spine is well compensated in the coronal and sagittal plane. There is no asymmetry on Diaz Forward Bend Test.\par \par Gait: The patient has moderate difficulty with toe walk and heel walk. The patient is able to tandem walk without difficulty.\par \par Palpation: no tenderness to palpation midline along spine or paraspinal region\par \par Range of motion: Cervical ROM restricted, Lumbar spine ROM is full\par \par Neurologic Exam:\par Motor: Manual Muscle testing in the upper and lower extremities is 5 out of 5 in all muscle groups. There is no evidence of muscular atrophy in the upper extremities. Sensory: Sensation to light touch is grossly intact in the upper and lower extremities\par \par Reflexes: DTR are present and symmetric throughout, negative starr bilaterally, negative inverted radial reflex bilaterally, no clonus, plantar responses are flexor\par \par Special tests: + tinels right, + phalen left, Spurlings sign absent. Lhermitte's sign is absent. Straight Leg Raise Negative, Cross Straight Leg Raise Negative, ROBIN Test Negative\par \par Hip Exam: Full painless ROM of bilateral hips\par \par Vascular: Examination of the peripheral vascular system demonstrates no evidence of congestion or edema. no lymphedema bilateral lower extremities, pulses are present and symmetric in both lower extremities.  [de-identified] : XR cervical 8/15/18: moderate multilevel degenerative changes, no spondylolisthesis, no acute fractures\par \par CT 8/8/18: Severe bilateral C4/5 foraminal stenosis, with osseous fragment within the left neural foramen, moderate-severe multilevel bilateral foraminal stenosis; no central canal stenosis; no large disc herniations, no spondylolisthesis. \par \par CT myelogram 9/2018: severe left and mod to severe right C4/5 foraminal stenosis; C5/6 moderate central stenosis without signicant cord compression. Bilateral C5/6 and C6/7 foraminal stenosis. Extensive disc degeneration. \par

## 2019-10-02 NOTE — DISCUSSION/SUMMARY
[de-identified] : Discussed the results of the patient's history, physical exam, and imaging. Ms. Corona continues to have neck pain that radiates down both upper extremities to all fingers. Episodes of radicular symptoms used to occur once every 3 months, now about once per month. Numbness/paresthesias in both hands, all fingers. Mild problems with balance, no difficulty with hand dexterity. Symptoms most bothersome when riding her bike, otherwise do not significantly affect daily activities. There is no indication for updated imaging or surgical intervention at this time as patient's symptoms are mild and intermittent. Neurologically intact on exam. The patient will follow up with me in approximately 3 months, sooner if there is an issue.  All questions were answered.\par \par

## 2019-10-02 NOTE — HISTORY OF PRESENT ILLNESS
[de-identified] : Follow Up 9/4/19: Patient reports neck pain that radiates down both arms to all fingers. Episodes of radicular symptoms used to occur once every 3 months, now about once per month. Numbness/paresthesias in both hands, all fingers. Mild problems with balance, no difficulty with hand dexterity. Symptoms most bothersome when riding her bike, otherwise do not significantly affect daily activities.\par \par Follow Up 4/30/19: Patient still has neck pain that occasionally radiates to left arm, episodes last several minutes and resolve. Does not significantly affect daily activities, is able to exercise without restriction. Reports mild balance difficulty, denies problems with hand dexterity. \par \par Follow up 1/22/19: has axial neck pain. has radiation of to left shoulder and upper extremity for a few minutes at at time approx every other day. does not affect quality of life. working. no new weakness per patient. complains of left groin and left lateral thigh pain. does not radiate distal to knee. had R RUBIN in past and she feels this pain is similar.\par \par Follow up: has axial neck pain. radiation of neck pain to shoulder and extremities has resolved. no significant numbness/tingling. no bowel or bladder symtpoms. Had CT myelogram cervical and thoracic.\par \par Initial: Referred by Dr. Craig\par \par Ms. MARIEE is a very pleasant 72 year old female who complains of neck pain for years, atraumatic, worsening after fall last week. On initial presentation symptoms were as follows: Patient described the pain as constant. Patient rated the pain as 5/10 today, 6/10 average this week, 9/10 at its worst. The pain localized to neck, worse on left side. The pain radiates down both arms to all fingers. Patient has upper extremity paresthesias in the same distribution. The pain is relieved by lying down. The pain is worsened by activity. She has also noticed difficulty with balance in the past few years, progressing. Past treatments included pharmacologic management, with minimal temporary relief. The patient has not had physical therapy or steroid injections. Patient saw Dr. Craig yesterday, who ordered a CT myelogram cervical (patient has a pacemaker-2003).\par \par The patient reports loss of bilateral hand dexterity and  strength.\par The patient states she has had difficulty balance when walking for several years.\par There no sensory loss in the arms or legs\par The patent no difficulty with urination.\par \par The patient no history of previous spine surgery.\par The patient no previous spine consultation.\par \par Pain:\par Neck 40 Right Arm 30 Left Arm 30\par \par The patient has no history of unexpected weight loss, no history of active cancer, no history bladder or bowel dysfunction, no night pain, no fevers or chills.\par \par The past medical history, surgical history, family history, allergies, medications, review of systems, family history and social history were reviewed and non contributory.

## 2019-10-16 ENCOUNTER — EMERGENCY (EMERGENCY)
Facility: HOSPITAL | Age: 74
LOS: 1 days | Discharge: HOME | End: 2019-10-16
Admitting: EMERGENCY MEDICINE
Payer: COMMERCIAL

## 2019-10-16 VITALS
HEART RATE: 110 BPM | SYSTOLIC BLOOD PRESSURE: 155 MMHG | RESPIRATION RATE: 18 BRPM | DIASTOLIC BLOOD PRESSURE: 88 MMHG | OXYGEN SATURATION: 95 % | TEMPERATURE: 98 F

## 2019-10-16 DIAGNOSIS — Z79.82 LONG TERM (CURRENT) USE OF ASPIRIN: ICD-10-CM

## 2019-10-16 DIAGNOSIS — S60.012A CONTUSION OF LEFT THUMB WITHOUT DAMAGE TO NAIL, INITIAL ENCOUNTER: ICD-10-CM

## 2019-10-16 DIAGNOSIS — I25.10 ATHEROSCLEROTIC HEART DISEASE OF NATIVE CORONARY ARTERY WITHOUT ANGINA PECTORIS: ICD-10-CM

## 2019-10-16 DIAGNOSIS — M79.672 PAIN IN LEFT FOOT: ICD-10-CM

## 2019-10-16 DIAGNOSIS — Z91.040 LATEX ALLERGY STATUS: ICD-10-CM

## 2019-10-16 DIAGNOSIS — S01.111A LACERATION WITHOUT FOREIGN BODY OF RIGHT EYELID AND PERIOCULAR AREA, INITIAL ENCOUNTER: ICD-10-CM
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
## 2019-10-16 DIAGNOSIS — Z88.1 ALLERGY STATUS TO OTHER ANTIBIOTIC AGENTS STATUS: ICD-10-CM

## 2019-10-16 DIAGNOSIS — S01.81XA LACERATION WITHOUT FOREIGN BODY OF OTHER PART OF HEAD, INITIAL ENCOUNTER: ICD-10-CM
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
## 2019-10-16 DIAGNOSIS — Z88.0 ALLERGY STATUS TO PENICILLIN: ICD-10-CM

## 2019-10-16 DIAGNOSIS — Z95.0 PRESENCE OF CARDIAC PACEMAKER: Chronic | ICD-10-CM

## 2019-10-16 DIAGNOSIS — Z96.641 PRESENCE OF RIGHT ARTIFICIAL HIP JOINT: Chronic | ICD-10-CM
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
## 2019-10-16 PROCEDURE — 99285 EMERGENCY DEPT VISIT HI MDM: CPT | Mod: 25

## 2019-10-16 PROCEDURE — 73630 X-RAY EXAM OF FOOT: CPT | Mod: 26,LT

## 2019-10-16 PROCEDURE — 73140 X-RAY EXAM OF FINGER(S): CPT | Mod: 26,LT

## 2019-10-16 PROCEDURE — 70450 CT HEAD/BRAIN W/O DYE: CPT | Mod: 26

## 2019-10-16 PROCEDURE — 70450 CT HEAD/BRAIN W/O DYE: CPT

## 2019-10-16 PROCEDURE — 99284 EMERGENCY DEPT VISIT MOD MDM: CPT

## 2019-10-16 PROCEDURE — 73140 X-RAY EXAM OF FINGER(S): CPT

## 2019-10-16 PROCEDURE — 73630 X-RAY EXAM OF FOOT: CPT

## 2019-10-16 NOTE — ED PROVIDER NOTE - CLINICAL SUMMARY MEDICAL DECISION MAKING FREE TEXT BOX
73 y/o presents with facial laceration s/p fall; CT head neg, intact neuro exam, xrays of hand and foot neg.  Lac repaired by plastics, will d/c to f/u

## 2019-10-16 NOTE — ED PROVIDER NOTE - MUSCULOSKELETAL, MLM
left thumb +tenderness to PIPJ with small ecchymosis, +FROM, no swelling or deformity.  left foot +tenderness to toes 3-5 with no swelling, no ecchymosis, +FROM, strength 5/5, sensation intact distally

## 2019-10-16 NOTE — ED ADULT NURSE NOTE - NSIMPLEMENTINTERV_GEN_ALL_ED
Implemented All Universal Safety Interventions:  [place] to call system. Call bell, personal items and telephone within reach. Instruct patient to call for assistance. Room bathroom lighting operational. Non-slip footwear when patient is off stretcher. Physically safe environment: no spills, clutter or unnecessary equipment. Stretcher in lowest position, wheels locked, appropriate side rails in place.

## 2019-10-16 NOTE — ED ADULT TRIAGE NOTE - CHIEF COMPLAINT QUOTE
laceration to right forehead.  Patient verbalized she was walking down the stairs, and her knees buckled and she tripped over 3 steps hitting her head on floor.  Denies loc, use of thinners, neck / back pain.  Unknown tetanus.  Bleeding controlled.

## 2019-10-16 NOTE — ED ADULT NURSE NOTE - OBJECTIVE STATEMENT
Patient presents to the ED after she tripped and fell going up the stairs. Patient presents to the ED after she tripped and fell going up the stairs.  denies LOC.  Patient c/o pain to her bilateral knees, left foot, right eye.  denies nausea, vomiting, neck pain.  Patient ambulatory.   Patient has 2cm laceration to right lateral eye.  no active bleeding.  no trauma noted elsewhere.

## 2019-10-16 NOTE — ED PROVIDER NOTE - CARE PROVIDER_API CALL
Olvin Vargas)  Plastic Surgery  [address] [place] [address]  Phone: [phone]  Fax: [phone]  Follow Up Time:

## 2019-10-16 NOTE — ED PROVIDER NOTE - PATIENT PORTAL LINK FT
You can access the FollowMyHealth Patient Portal offered by [facility] by registering at the following website: [email or web address]. By joining [facility]’s FollowMyHealth portal, you will also be able to view your health information using other applications (apps) compatible with our system.

## 2019-10-16 NOTE — ED PROVIDER NOTE - NSFOLLOWUPINSTRUCTIONS_ED_ALL_ED_FT
Facial Laceration    WHAT YOU NEED TO KNOW:    A facial laceration is a tear or cut in the skin. Facial lacerations may be closed within 24 hours of injury.    DISCHARGE INSTRUCTIONS:    Return to the emergency department if:     You have a fever and the wound is painful, warm, or swollen. The wound area may be red, or fluid may come out of it.      You have heavy bleeding or bleeding that does not stop after 10 minutes of holding firm, direct pressure over the wound.    Call your doctor if:     Your wound reopens or your tape comes off.      Your wound is very painful.      Your wound is not healing, or you think there is an object in the wound.      The skin around your wound stays numb.      You have questions or concerns about your condition or care.    Medicines:     Antibiotics may be given to prevent an infection if your wound was deep and had to be cleaned out.      Take your medicine as directed. Contact your healthcare provider if you think your medicine is not helping or if you have side effects. Tell him of her if you are allergic to any medicine. Keep a list of the medicines, vitamins, and herbs you take. Include the amounts, and when and why you take them. Bring the list or the pill bottles to follow-up visits. Carry your medicine list with you in case of an emergency.    Care for your wound: Care for your wound as directed to prevent infection and help it heal. Wash your hands with soap and warm water before and after you care for your wound. You may need to keep the wound dry for the first 24 to 48 hours. When your healthcare provider says it is okay, wash around your wound with soap and water, or as directed. Gently pat the area dry. Do not use alcohol or hydrogen peroxide to clean your wound unless you are directed to.    Do not take aspirin or NSAIDs for 24 hours after being injured. Aspirin and NSAIDs can increase blood flow. Your laceration may continue to bleed.       Do not take hot showers, eat or drink hot foods and liquids for 48 hours after being injured. Also, do not use a heating pad near your laceration. The heat can cause swelling in and around your laceration.      If your wound was covered with a bandage, leave your bandage on as long as directed. Bandages keep your wound clean and protected. They can also prevent swelling. Ask when and how to change your bandage. Be careful not to apply the bandage or tape too tightly. This could cut off blood flow and cause more injury.       If your wound was closed with stitches, keep your wound clean. Your healthcare provider may recommend that you apply antibiotic ointment after you clean your wound.       If your wound was closed with wound tape or medical strips, keep the area clean and dry. The strips will usually fall off on their own after several days.      If your wound was closed with tissue glue, do not use any ointments or lotions on the area. You may shower, but do not swim or soak in a bathtub. Gently pat the area dry after you take a shower. Do not pick at or scrub the glue area.     Decrease scarring: The skin in the area of your wound may turn a different color if it is exposed to direct sunlight. After your wound is healed, use sunscreen over the area when you are out in the sun. You should do this for at least 6 months to 1 year after your injury. Some wounds scar less if they are covered while they heal.    Follow up with your doctor as directed: You may need to follow up with your healthcare provider in 24 to 48 hours to have your wound checked for infection. You may need to return in 3 to 5 days if you have stitches that need to be removed. Write down your questions so you remember to ask them during your visits.

## 2019-10-16 NOTE — ED PROVIDER NOTE - OBJECTIVE STATEMENT
74 y/o f, employee at [employer], presents s/p trip and fall, hit right side of head with laceration to right forehead.  Pt stating she also has pain to left thumb and left foot.  Denies LOC, visual changes, dizziness, n/v, all other ROS negative.

## 2019-10-23 ENCOUNTER — APPOINTMENT (OUTPATIENT)
Dept: HEART AND VASCULAR | Facility: CLINIC | Age: 74
End: 2019-10-23
Payer: COMMERCIAL

## 2019-10-23 VITALS
BODY MASS INDEX: 26.34 KG/M2 | HEIGHT: 65.35 IN | DIASTOLIC BLOOD PRESSURE: 90 MMHG | TEMPERATURE: 98.8 F | SYSTOLIC BLOOD PRESSURE: 140 MMHG | HEART RATE: 97 BPM | WEIGHT: 159.99 LBS | OXYGEN SATURATION: 96 %

## 2019-10-23 PROCEDURE — 99214 OFFICE O/P EST MOD 30 MIN: CPT

## 2019-10-23 PROCEDURE — 93000 ELECTROCARDIOGRAM COMPLETE: CPT

## 2019-10-23 RX ORDER — CELECOXIB 200 MG/1
200 CAPSULE ORAL
Qty: 60 | Refills: 0 | Status: DISCONTINUED | COMMUNITY
Start: 2019-02-21 | End: 2019-10-23

## 2019-10-23 NOTE — ASSESSMENT
[FreeTextEntry1] : HTN-  BP high here, increased Cardizem ER to 60 BID.   Also high with Dr Shrestha.  BP slightly up. No changes, meds renewed.  BP better due to pt taking Diltiazem BID as directed, not once daily.  BP up but missed Altace for 2 weeks, ran out and new one did not come. no changes.  She forgot to tell Dr Shrestha this.\par \par Near syncope Sept 6 and then a fall Oct 16.  Both episodes ended with pt  in the ER. To see Dr Dwyer for pacer interrogation. Pt had NL echoes in 2013, 14 and 16.\par \par Prediabetes-  Wt has been fluctuating.  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

## 2019-10-23 NOTE — PHYSICAL EXAM
[General Appearance - Well Developed] : well developed [Normal Appearance] : normal appearance [General Appearance - Well Nourished] : well nourished [Well Groomed] : well groomed [No Deformities] : no deformities [General Appearance - In No Acute Distress] : no acute distress [Normal Conjunctiva] : the conjunctiva exhibited no abnormalities [Eyelids - No Xanthelasma] : the eyelids demonstrated no xanthelasmas [Normal Oral Mucosa] : normal oral mucosa [No Oral Pallor] : no oral pallor [No Oral Cyanosis] : no oral cyanosis [Normal Jugular Venous A Waves Present] : normal jugular venous A waves present [No Jugular Venous Leo A Waves] : no jugular venous leo A waves [Normal Jugular Venous V Waves Present] : normal jugular venous V waves present [Respiration, Rhythm And Depth] : normal respiratory rhythm and effort [Exaggerated Use Of Accessory Muscles For Inspiration] : no accessory muscle use [Auscultation Breath Sounds / Voice Sounds] : lungs were clear to auscultation bilaterally [Heart Rate And Rhythm] : heart rate and rhythm were normal [Murmurs] : no murmurs present [Heart Sounds] : normal S1 and S2 [Abdomen Soft] : soft [Abdomen Tenderness] : non-tender [Abdomen Mass (___ Cm)] : no abdominal mass palpated [Abnormal Walk] : normal gait [Gait - Sufficient For Exercise Testing] : the gait was sufficient for exercise testing [Nail Clubbing] : no clubbing of the fingernails [Cyanosis, Localized] : no localized cyanosis [Petechial Hemorrhages (___cm)] : no petechial hemorrhages [Skin Color & Pigmentation] : normal skin color and pigmentation [No Venous Stasis] : no venous stasis [] : no rash [Skin Lesions] : no skin lesions [No Skin Ulcers] : no skin ulcer [Oriented To Time, Place, And Person] : oriented to person, place, and time [No Xanthoma] : no  xanthoma was observed [Affect] : the affect was normal [Mood] : the mood was normal [No Anxiety] : not feeling anxious [FreeTextEntry1] : mild cataracts

## 2019-10-28 ENCOUNTER — OUTPATIENT (OUTPATIENT)
Dept: OUTPATIENT SERVICES | Facility: HOSPITAL | Age: 74
LOS: 1 days | End: 2019-10-28

## 2019-10-28 ENCOUNTER — APPOINTMENT (OUTPATIENT)
Dept: ULTRASOUND IMAGING | Facility: CLINIC | Age: 74
End: 2019-10-28
Payer: COMMERCIAL

## 2019-10-28 ENCOUNTER — APPOINTMENT (OUTPATIENT)
Dept: MAMMOGRAPHY | Facility: CLINIC | Age: 74
End: 2019-10-28
Payer: COMMERCIAL

## 2019-10-28 DIAGNOSIS — Z96.641 PRESENCE OF RIGHT ARTIFICIAL HIP JOINT: Chronic | ICD-10-CM

## 2019-10-28 DIAGNOSIS — Z95.0 PRESENCE OF CARDIAC PACEMAKER: Chronic | ICD-10-CM

## 2019-10-28 PROCEDURE — 77063 BREAST TOMOSYNTHESIS BI: CPT | Mod: 26

## 2019-10-28 PROCEDURE — 77067 SCR MAMMO BI INCL CAD: CPT | Mod: 26

## 2019-10-28 PROCEDURE — 76641 ULTRASOUND BREAST COMPLETE: CPT | Mod: 26,50

## 2019-11-05 ENCOUNTER — TRANSCRIPTION ENCOUNTER (OUTPATIENT)
Age: 74
End: 2019-11-05

## 2019-11-22 ENCOUNTER — APPOINTMENT (OUTPATIENT)
Dept: HEART AND VASCULAR | Facility: CLINIC | Age: 74
End: 2019-11-22
Payer: COMMERCIAL

## 2019-11-22 VITALS
HEART RATE: 69 BPM | HEIGHT: 65 IN | DIASTOLIC BLOOD PRESSURE: 69 MMHG | BODY MASS INDEX: 26.33 KG/M2 | WEIGHT: 158 LBS | SYSTOLIC BLOOD PRESSURE: 129 MMHG

## 2019-11-22 PROCEDURE — 99212 OFFICE O/P EST SF 10 MIN: CPT | Mod: 25

## 2019-11-22 PROCEDURE — 93280 PM DEVICE PROGR EVAL DUAL: CPT

## 2019-11-22 NOTE — HISTORY OF PRESENT ILLNESS
[FreeTextEntry1] : 73 y/o F HTN, HLD, syncope, sinus node dysfunction s/p PPM placement 2005 ([facility]- Dr. Mondragon) who presents for follow-up.  Previously followed by Dr. Champion.  \par \par H/O syncope following PPM placement after initiation of Norvasc (2005)- no further events.  Notes rare episodes of palpitations- describes a "beat that grabs me" and then lets go.  No sustained rapid heart action.  No CP, SOB/CONTI, dizziness, presyncope/syncope.  \par \par Last device check was 3/2018. Pt presents today after having a near syncopal episode in October. She would like to see if the episode correlated with an arrhythmia on her device.

## 2019-11-22 NOTE — PHYSICAL EXAM
[General Appearance - Well Developed] : well developed [Normal Appearance] : normal appearance [Well Groomed] : well groomed [General Appearance - Well Nourished] : well nourished [No Deformities] : no deformities [General Appearance - In No Acute Distress] : no acute distress [Left Infraclavicular] : left infraclavicular area [Clean] : clean [Dry] : dry [Well-Healed] : well-healed [FreeTextEntry1] : left infraclavicular incision well healed

## 2019-11-22 NOTE — PROCEDURE
[No] : not [NSR] : normal sinus rhythm [Pacemaker] : pacemaker [VVI] : VVI [None] : none [de-identified] : [place] [employer] [de-identified] : Insignia Ultra [de-identified] : [id number] [de-identified] : 50 [de-identified] : EOL

## 2019-11-22 NOTE — REASON FOR VISIT
[Follow-up Device Check] : follow-up device check visit [SULAIMAN (Elective Replacement Indication)] : elective replacement indication [Pacemaker Evaluation] : pacemaker ~T evaluation ~C was performed

## 2019-11-27 ENCOUNTER — OUTPATIENT (OUTPATIENT)
Dept: OUTPATIENT SERVICES | Facility: HOSPITAL | Age: 74
LOS: 1 days | Discharge: HOME | End: 2019-11-27
Payer: COMMERCIAL

## 2019-11-27 DIAGNOSIS — Z96.641 PRESENCE OF RIGHT ARTIFICIAL HIP JOINT: Chronic | ICD-10-CM

## 2019-11-27 DIAGNOSIS — Z95.0 PRESENCE OF CARDIAC PACEMAKER: Chronic | ICD-10-CM

## 2019-11-27 PROCEDURE — 33228 REMV&REPLC PM GEN DUAL LEAD: CPT

## 2019-11-27 PROCEDURE — C1889: CPT

## 2019-11-27 PROCEDURE — C1785: CPT

## 2019-11-27 RX ORDER — ACETAMINOPHEN 500 MG
650 TABLET ORAL ONCE
Refills: 0 | Status: COMPLETED | OUTPATIENT
Start: 2019-11-27 | End: 2019-11-27

## 2019-11-27 RX ADMIN — Medication 650 MILLIGRAM(S): at 12:45

## 2019-12-04 ENCOUNTER — APPOINTMENT (OUTPATIENT)
Dept: ORTHOPEDIC SURGERY | Facility: CLINIC | Age: 74
End: 2019-12-04

## 2019-12-04 DIAGNOSIS — Z45.010 ENCOUNTER FOR CHECKING AND TESTING OF CARDIAC PACEMAKER PULSE GENERATOR [BATTERY]: ICD-10-CM

## 2020-01-17 ENCOUNTER — APPOINTMENT (OUTPATIENT)
Dept: HEART AND VASCULAR | Facility: CLINIC | Age: 75
End: 2020-01-17
Payer: COMMERCIAL

## 2020-01-17 VITALS
WEIGHT: 158 LBS | HEIGHT: 65 IN | DIASTOLIC BLOOD PRESSURE: 76 MMHG | HEART RATE: 70 BPM | SYSTOLIC BLOOD PRESSURE: 144 MMHG | BODY MASS INDEX: 26.33 KG/M2

## 2020-01-17 PROCEDURE — 93280 PM DEVICE PROGR EVAL DUAL: CPT

## 2020-01-17 NOTE — PROCEDURE
[No] : not [NSR] : normal sinus rhythm [Pacemaker] : pacemaker [VVI] : VVI [___V @] : [unfilled] V [Sensing Amplitude ___mv] : sensing amplitude was [unfilled] mv [Lead Imp:  ___ohms] : lead impedance was [unfilled] ohms [___ ms] : [unfilled] ms [de-identified] : [place] Sci [None] : none [de-identified] : [place] MRI [de-identified] :  [de-identified] : 11/27/2019 [de-identified] : [id number] [de-identified] : AP 26%\par  <1%\par No AT/AF [de-identified] : 15 years

## 2020-01-17 NOTE — REASON FOR VISIT
[Follow-up Device Check] : follow-up device check visit [Other ___] : [unfilled] [Pacemaker Evaluation] : pacemaker ~T evaluation ~C was performed

## 2020-01-17 NOTE — HISTORY OF PRESENT ILLNESS
[FreeTextEntry1] : 75 y/o F HTN, HLD, syncope, sinus node dysfunction s/p PPM placement 2005 ([facility]- Dr. Mondragon), now s/p generator change 11/27/2019. Pt presents for f/u. \par \par H/O syncope following PPM placement after initiation of Norvasc (2005)- no further events.  Notes rare episodes of palpitations- describes a "beat that grabs me" and then lets go.  No sustained rapid heart action.  No CP, SOB/CONTI, dizziness, presyncope/syncope.  \par \par

## 2020-01-17 NOTE — PHYSICAL EXAM
[General Appearance - Well Developed] : well developed [Normal Appearance] : normal appearance [General Appearance - Well Nourished] : well nourished [Well Groomed] : well groomed [No Deformities] : no deformities [Left Infraclavicular] : left infraclavicular area [General Appearance - In No Acute Distress] : no acute distress [Dry] : dry [Clean] : clean [Well-Healed] : well-healed [FreeTextEntry1] : left infraclavicular incision well healed

## 2020-02-23 ENCOUNTER — TRANSCRIPTION ENCOUNTER (OUTPATIENT)
Age: 75
End: 2020-02-23

## 2020-03-02 ENCOUNTER — FORM ENCOUNTER (OUTPATIENT)
Age: 75
End: 2020-03-02

## 2020-03-03 ENCOUNTER — APPOINTMENT (OUTPATIENT)
Dept: ORTHOPEDIC SURGERY | Facility: CLINIC | Age: 75
End: 2020-03-03
Payer: COMMERCIAL

## 2020-03-03 ENCOUNTER — OUTPATIENT (OUTPATIENT)
Dept: OUTPATIENT SERVICES | Facility: HOSPITAL | Age: 75
LOS: 1 days | End: 2020-03-03
Payer: COMMERCIAL

## 2020-03-03 DIAGNOSIS — Z96.641 PRESENCE OF RIGHT ARTIFICIAL HIP JOINT: Chronic | ICD-10-CM

## 2020-03-03 DIAGNOSIS — Z95.0 PRESENCE OF CARDIAC PACEMAKER: Chronic | ICD-10-CM

## 2020-03-03 PROCEDURE — 72100 X-RAY EXAM L-S SPINE 2/3 VWS: CPT | Mod: 26

## 2020-03-03 PROCEDURE — 72190 X-RAY EXAM OF PELVIS: CPT

## 2020-03-03 PROCEDURE — 72190 X-RAY EXAM OF PELVIS: CPT | Mod: 26

## 2020-03-03 PROCEDURE — 99213 OFFICE O/P EST LOW 20 MIN: CPT

## 2020-03-03 PROCEDURE — 72100 X-RAY EXAM L-S SPINE 2/3 VWS: CPT

## 2020-03-17 ENCOUNTER — TRANSCRIPTION ENCOUNTER (OUTPATIENT)
Age: 75
End: 2020-03-17

## 2020-04-04 ENCOUNTER — TRANSCRIPTION ENCOUNTER (OUTPATIENT)
Age: 75
End: 2020-04-04

## 2020-04-08 ENCOUNTER — APPOINTMENT (OUTPATIENT)
Dept: HEART AND VASCULAR | Facility: CLINIC | Age: 75
End: 2020-04-08

## 2020-04-26 ENCOUNTER — MESSAGE (OUTPATIENT)
Age: 75
End: 2020-04-26

## 2020-05-05 NOTE — DISCUSSION/SUMMARY
[de-identified] : Discussed my findings with the patient. Patient reports left buttock pain, onset after a fall. No fractures seen on XR. I am recommending a course of physical therapy, order given. In regards to cervical stenosis, patient reports no change in cervical radicular symptoms, balance, or hand dexterity.  She will follow up with me in 3 months, sooner if there is an issue. All questions answered. \par \par

## 2020-05-05 NOTE — PHYSICAL EXAM
[de-identified] : Physical Exam:\par \par General: patient is well developed, well nourished, in no acute \par distress, alert and oriented x 3. \par \par Mood and affect: normal\par \par Respiratory: no respiratory distress noted\par \par Skin: no scars over spine, skin intact, no erythema, increased warmth\par \par Alignment:The spine is well compensated in the coronal and sagittal plane. There is no asymmetry on Diaz Forward Bend Test.\par \par Gait: The patient is able to toe walk and heel walk without difficulty. The patient is able to tandem walk without difficulty.\par \par Palpation: no tenderness to palpation midline along spine or paraspinal region\par \par Range of motion: Cervical and Lumbar spine ROM is full\par \par Neurologic Exam:\par Motor: Manual Muscle testing in the upper and lower extremities is 5 out of 5 in all muscle groups. There is no evidence of muscular atrophy in the upper extremities. Sensory: Sensation to light touch is grossly intact in the upper and lower extremities\par \par Reflexes: DTR are present and symmetric throughout, negative starr bilaterally, negative inverted radial reflex bilaterally, no clonus, plantar responses are flexor\par \par Special tests: Spurlings sign absent. Lhermitte's sign is absent. Straight Leg Raise Negative, Cross Straight Leg Raise Negative, ROBIN Test Negative\par \par Hip Exam: Full painless ROM of bilateral hips\par \par Vascular: Examination of the peripheral vascular system demonstrates no evidence of congestion or edema. no lymphedema bilateral lower extremities, pulses are present and symmetric in both lower extremities.\par \par \par  [de-identified] : XR pelvis 3/3/20: s/p right RUBIN, hardware intact, no evidence of fracture or dislocation\par \par XR lumbar 3/3/20: multilevel degenerative changes, no spondylolisthesis, no fractures seen\par \par XR cervical 8/15/18: moderate multilevel degenerative changes, no spondylolisthesis, no acute fractures\par \par CT 8/8/18: Severe bilateral C4/5 foraminal stenosis, with osseous fragment within the left neural foramen, moderate-severe multilevel bilateral foraminal stenosis; no central canal stenosis; no large disc herniations, no spondylolisthesis. \par \par CT myelogram 9/2018: severe left and mod to severe right C4/5 foraminal stenosis; C5/6 moderate central stenosis without signicant cord compression. Bilateral C5/6 and C6/7 foraminal stenosis. Extensive disc degeneration.

## 2020-05-05 NOTE — HISTORY OF PRESENT ILLNESS
[de-identified] : Follow up 3/3/20: Patient reports left buttock pain, onset after a fall. Still has neck pain that radiates down both arms to all fingers, unchanged since last visit 6 months ago.. Episodes of radicular symptoms used to occur once every 3 months, now about once per month. Numbness/paresthesias in both hands, all fingers. Mild problems with balance, no difficulty with hand dexterity. Symptoms most bothersome when riding her bike, otherwise do not significantly affect daily activities.\par \par Follow Up 9/4/19: Patient reports neck pain that radiates down both arms to all fingers. Episodes of radicular symptoms used to occur once every 3 months, now about once per month. Numbness/paresthesias in both hands, all fingers. Mild problems with balance, no difficulty with hand dexterity. Symptoms most bothersome when riding her bike, otherwise do not significantly affect daily activities.\par \par Follow Up 4/30/19: Patient still has neck pain that occasionally radiates to left arm, episodes last several minutes and resolve. Does not significantly affect daily activities, is able to exercise without restriction. Reports mild balance difficulty, denies problems with hand dexterity. \par \par Follow up 1/22/19: has axial neck pain. has radiation of to left shoulder and upper extremity for a few minutes at at time approx every other day. does not affect quality of life. working. no new weakness per patient. complains of left groin and left lateral thigh pain. does not radiate distal to knee. had R RUBIN in past and she feels this pain is similar.\par \par Follow up: has axial neck pain. radiation of neck pain to shoulder and extremities has resolved. no significant numbness/tingling. no bowel or bladder symtpoms. Had CT myelogram cervical and thoracic.\par \par Initial: Referred by Dr. Craig\par \par Ms. MARIEE is a very pleasant 72 year old female who complains of neck pain for years, atraumatic, worsening after fall last week. On initial presentation symptoms were as follows: Patient described the pain as constant. Patient rated the pain as 5/10 today, 6/10 average this week, 9/10 at its worst. The pain localized to neck, worse on left side. The pain radiates down both arms to all fingers. Patient has upper extremity paresthesias in the same distribution. The pain is relieved by lying down. The pain is worsened by activity. She has also noticed difficulty with balance in the past few years, progressing. Past treatments included pharmacologic management, with minimal temporary relief. The patient has not had physical therapy or steroid injections. Patient saw Dr. Craig yesterday, who ordered a CT myelogram cervical (patient has a pacemaker-2003).\par \par The patient reports loss of bilateral hand dexterity and  strength.\par The patient states she has had difficulty balance when walking for several years.\par There no sensory loss in the arms or legs\par The patent no difficulty with urination.\par \par The patient no history of previous spine surgery.\par The patient no previous spine consultation.\par \par Pain:\par Neck 40 Right Arm 30 Left Arm 30\par \par The patient has no history of unexpected weight loss, no history of active cancer, no history bladder or bowel dysfunction, no night pain, no fevers or chills.\par \par The past medical history, surgical history, family history, allergies, medications, review of systems, family history and social history were reviewed and non contributory. \par  \par

## 2020-05-06 ENCOUNTER — APPOINTMENT (OUTPATIENT)
Dept: DISASTER EMERGENCY | Facility: CLINIC | Age: 75
End: 2020-05-06

## 2020-05-07 LAB
SARS-COV-2 IGG SERPL IA-ACNC: <0.1 INDEX
SARS-COV-2 IGG SERPL QL IA: NEGATIVE

## 2020-07-16 ENCOUNTER — APPOINTMENT (OUTPATIENT)
Dept: HEART AND VASCULAR | Facility: CLINIC | Age: 75
End: 2020-07-16

## 2020-09-23 ENCOUNTER — RESULT REVIEW (OUTPATIENT)
Age: 75
End: 2020-09-23

## 2020-09-23 ENCOUNTER — OUTPATIENT (OUTPATIENT)
Dept: OUTPATIENT SERVICES | Facility: HOSPITAL | Age: 75
LOS: 1 days | End: 2020-09-23
Payer: COMMERCIAL

## 2020-09-23 ENCOUNTER — APPOINTMENT (OUTPATIENT)
Dept: ORTHOPEDIC SURGERY | Facility: CLINIC | Age: 75
End: 2020-09-23
Payer: COMMERCIAL

## 2020-09-23 VITALS — TEMPERATURE: 97.9 F

## 2020-09-23 DIAGNOSIS — Z95.0 PRESENCE OF CARDIAC PACEMAKER: Chronic | ICD-10-CM

## 2020-09-23 DIAGNOSIS — Z96.641 PRESENCE OF RIGHT ARTIFICIAL HIP JOINT: Chronic | ICD-10-CM

## 2020-09-23 PROCEDURE — 99213 OFFICE O/P EST LOW 20 MIN: CPT

## 2020-09-23 PROCEDURE — 72050 X-RAY EXAM NECK SPINE 4/5VWS: CPT

## 2020-09-23 PROCEDURE — 72050 X-RAY EXAM NECK SPINE 4/5VWS: CPT | Mod: 26

## 2020-09-23 NOTE — REASON FOR VISIT
[Follow-Up Visit] : a follow-up visit for [No Fault] : this visit is related to no fault  [Neck Pain] : neck pain [Other: ____] : [unfilled]
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
## 2020-10-07 NOTE — PHYSICAL EXAM
[de-identified] : Physical Exam:\par \par General: patient is well developed, well nourished, in no acute \par distress, alert and oriented x 3. \par \par Mood and affect: normal\par \par Respiratory: no respiratory distress noted\par \par Skin: no scars over spine, skin intact, no erythema, increased warmth\par \par Alignment:The spine is well compensated in the coronal and sagittal plane. There is no asymmetry on Diaz Forward Bend Test.\par \par Gait: The patient is able to toe walk and heel walk without difficulty. The patient is able to tandem walk without difficulty.\par \par Palpation: no tenderness to palpation midline along spine or paraspinal region\par \par Range of motion: Cervical ROM is restricted due to pain\par \par Neurologic Exam:\par Motor: Manual Muscle testing in the upper and lower extremities is 5 out of 5 in all muscle groups. There is no evidence of muscular atrophy in the upper extremities. Sensory: Sensation to light touch is grossly intact in the upper and lower extremities\par \par Reflexes: DTR are present and symmetric throughout, negative starr bilaterally, negative inverted radial reflex bilaterally, no clonus, plantar responses are flexor\par \par Special tests: Spurlings sign absent. Lhermitte's sign is absent. Straight Leg Raise Negative, Cross Straight Leg Raise Negative, ROBIN Test Negative\par \par Hip Exam: Full painless ROM of bilateral hips\par \par Vascular: Examination of the peripheral vascular system demonstrates no evidence of congestion or edema. no lymphedema bilateral lower extremities, pulses are present and symmetric in both lower extremities.\par \par \par  [de-identified] : CT cervical 9/2020: multilevel disc osteophytes and facet arthropathy; severe foraminal stenosis bilaterally C4/C5, left C3/C4, no acute fractures\par \par XR cervical 9/23/20: multilevel degenerative changes, no dynamic instability, no acute fractures\par \par XR pelvis 3/3/20: s/p right RUBIN, hardware intact, no evidence of fracture or dislocation\par \par XR lumbar 3/3/20: multilevel degenerative changes, no spondylolisthesis, no fractures seen\par \par XR cervical 8/15/18: moderate multilevel degenerative changes, no spondylolisthesis, no acute fractures\par \par CT 8/8/18: Severe bilateral C4/5 foraminal stenosis, with osseous fragment within the left neural foramen, moderate-severe multilevel bilateral foraminal stenosis; no central canal stenosis; no large disc herniations, no spondylolisthesis. \par \par CT myelogram 9/2018: severe left and mod to severe right C4/5 foraminal stenosis; C5/6 moderate central stenosis without signicant cord compression. Bilateral C5/6 and C6/7 foraminal stenosis. Extensive disc degeneration.

## 2020-10-07 NOTE — DISCUSSION/SUMMARY
[de-identified] : Discussed the results of the patient's history, physical exam, and imaging. CT cervical shows no interval change, no acute fractures. She can discontinue the cervical collar. I have also recommended a nonsteroidal anti-inflammatory, etodolac, to be taken twice daily for 2 weeks.  If this is helpful I have instructed the patient to take it for an additional 2 weeks and then PRN afterwards.  A prescription for etodolac was given. If no significant relief with oral anti-inflammatories, recommending evaluation by pain management, referral given. The patient will follow up with me in approximately 2-3 months, sooner if there is an issue.  All questions were answered.\par \par

## 2020-10-13 ENCOUNTER — RX RENEWAL (OUTPATIENT)
Age: 75
End: 2020-10-13

## 2020-11-03 ENCOUNTER — APPOINTMENT (OUTPATIENT)
Dept: HEART AND VASCULAR | Facility: CLINIC | Age: 75
End: 2020-11-03
Payer: COMMERCIAL

## 2020-11-03 VITALS
WEIGHT: 162 LBS | HEART RATE: 86 BPM | TEMPERATURE: 98.4 F | SYSTOLIC BLOOD PRESSURE: 136 MMHG | BODY MASS INDEX: 26.99 KG/M2 | HEIGHT: 65 IN | DIASTOLIC BLOOD PRESSURE: 79 MMHG

## 2020-11-03 PROCEDURE — 99072 ADDL SUPL MATRL&STAF TM PHE: CPT

## 2020-11-03 PROCEDURE — 93280 PM DEVICE PROGR EVAL DUAL: CPT

## 2020-11-03 NOTE — REASON FOR VISIT
[Pacemaker Evaluation] : pacemaker ~T evaluation ~C was performed [Follow-up Device Check] : is here today for a follow-up device check visit for

## 2020-11-03 NOTE — PROCEDURE
[No] : not [NSR] : normal sinus rhythm [Pacemaker] : pacemaker [VVI] : VVI [Lead Imp:  ___ohms] : lead impedance was [unfilled] ohms [Sensing Amplitude ___mv] : sensing amplitude was [unfilled] mv [___V @] : [unfilled] V [___ ms] : [unfilled] ms [None] : none [de-identified] : [place] [facility] [de-identified] : [employer] MRI [de-identified] : [id number] [de-identified] : 11/27/2019 [de-identified] :  [de-identified] : 15 years [de-identified] : AP 7%\par  <1%\par Brief PAT\par 1 Brief episode of noise on RV lead
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
## 2020-11-10 ENCOUNTER — OUTPATIENT (OUTPATIENT)
Dept: OUTPATIENT SERVICES | Facility: HOSPITAL | Age: 75
LOS: 1 days | End: 2020-11-10

## 2020-11-10 ENCOUNTER — APPOINTMENT (OUTPATIENT)
Dept: RADIOLOGY | Facility: CLINIC | Age: 75
End: 2020-11-10
Payer: COMMERCIAL

## 2020-11-10 ENCOUNTER — APPOINTMENT (OUTPATIENT)
Dept: ULTRASOUND IMAGING | Facility: CLINIC | Age: 75
End: 2020-11-10
Payer: COMMERCIAL

## 2020-11-10 ENCOUNTER — APPOINTMENT (OUTPATIENT)
Dept: MAMMOGRAPHY | Facility: CLINIC | Age: 75
End: 2020-11-10
Payer: COMMERCIAL

## 2020-11-10 DIAGNOSIS — Z95.0 PRESENCE OF CARDIAC PACEMAKER: Chronic | ICD-10-CM

## 2020-11-10 DIAGNOSIS — Z96.641 PRESENCE OF RIGHT ARTIFICIAL HIP JOINT: Chronic | ICD-10-CM

## 2020-11-10 PROCEDURE — 76641 ULTRASOUND BREAST COMPLETE: CPT | Mod: 26,50

## 2020-11-10 PROCEDURE — 77063 BREAST TOMOSYNTHESIS BI: CPT | Mod: 26

## 2020-11-10 PROCEDURE — 77080 DXA BONE DENSITY AXIAL: CPT | Mod: 26

## 2020-11-10 PROCEDURE — 77067 SCR MAMMO BI INCL CAD: CPT | Mod: 26

## 2020-12-07 ENCOUNTER — NON-APPOINTMENT (OUTPATIENT)
Age: 75
End: 2020-12-07

## 2020-12-09 ENCOUNTER — APPOINTMENT (OUTPATIENT)
Dept: BREAST CENTER | Facility: CLINIC | Age: 75
End: 2020-12-09
Payer: COMMERCIAL

## 2020-12-09 VITALS — HEART RATE: 76 BPM | OXYGEN SATURATION: 98 % | WEIGHT: 162 LBS | BODY MASS INDEX: 26.99 KG/M2 | HEIGHT: 65 IN

## 2020-12-09 PROCEDURE — 99203 OFFICE O/P NEW LOW 30 MIN: CPT

## 2020-12-09 PROCEDURE — 99072 ADDL SUPL MATRL&STAF TM PHE: CPT

## 2020-12-09 NOTE — DATA REVIEWED
[FreeTextEntry1] : 11/10/2020 bilateral mammogram/ultrasound: showing breasts are heterogeneously dense. There is an extremely small benign-appearing solid nodule in the right breast 10:00 8 cm from the nipple measuring 4 x 2 x 7 mm, likely an incidental fibroadenoma, not previously documented on prior ultrasounds dating back to 7/2014, which therefore meets criteria for follow-up.  IMPRESSION: No mammographic/sonographic evidence of malignancy. Recommend annual follow-up of an extremely benign-appearing nodule in the right breast 10:00 8 cm from the nipple, to optimally document two-year stability, which can be performed at the time of next annual mammogram, due in 11/2021. RECOMMENDATION: Mammography and ultrasound in 1 year. BI-RADS 3.

## 2020-12-09 NOTE — PAST MEDICAL HISTORY
[Postmenopausal] : The patient is postmenopausal [Menarche Age ____] : age at menarche was [unfilled] [Menopause Age____] : age at menopause was [unfilled] [Approximately ___] : the LMP was approximately [unfilled] [Total Preg ___] : G[unfilled] [Live Births ___] : P[unfilled]  [Living ___] : Living: [unfilled] [Age At Live Birth ___] : Age at live birth: [unfilled] [History of Hormone Replacement Treatment] : has no history of hormone replacement treatment [FreeTextEntry5] : Advanced laparoscopic robotic assisted hysterectomy, BSO 8/23/16 [FreeTextEntry6] : None [FreeTextEntry7] : None [FreeTextEntry8] : 9 months

## 2020-12-09 NOTE — PHYSICAL EXAM
[Supple] : supple [No Supraclavicular Adenopathy] : no supraclavicular adenopathy [No Thyromegaly] : no thyromegaly [Examined in the supine and seated position] : examined in the supine and seated position [Asymmetrical] : asymmetrical [No dominant masses] : no dominant masses in right breast  [No dominant masses] : no dominant masses left breast [No Nipple Retraction] : no left nipple retraction [No Nipple Discharge] : no left nipple discharge [No Axillary Lymphadenopathy] : no left axillary lymphadenopathy

## 2020-12-09 NOTE — HISTORY OF PRESENT ILLNESS
[FreeTextEntry1] : Shilpi is a 75 year female referred by  (GYN) who presents for initial evaluation regarding a right breast nodule 10:00 8cmFN, small at 4 x 2 x 7mm, likely an incidental fibroadenoma found on screening ultrasound. Denies any palpable abnormalities, no skin changes, no nipple discharge bilaterally. ADRIAN 10.2%\par \par \par Of note, she is known by Dr. Patiño for an ovarian cyst and underwent a laparoscopic robotic assisted hysterectomy, BSO in 8/23/16. \par \par Shilpi has a cardiac pacemaker, she is unable to undergo MRI's.

## 2020-12-09 NOTE — REASON FOR VISIT
[Consultation] : a consultation visit [FreeTextEntry1] : right breast nodule 10:00 8cmFN, small at 4 x 2 x 7mm

## 2020-12-09 NOTE — ASSESSMENT
[FreeTextEntry1] : 75 year female with new right breast nodule 10:00 8cmFN, small at 4 x 2 x 7mm found on screening ultrasound.  ADRIAN 10.2%\par \par Discussed given her age of 75 with a new right breast lesion recommend a targeted right breast ultrasound with possible ultrasound guided biopsy in 3 months. \par \par Discussed the genetic implications of breast cancer and the role of genetic testing and associated implications for family members. Patient will proceed with genetic testing today via [employer]. \par

## 2020-12-21 ENCOUNTER — NON-APPOINTMENT (OUTPATIENT)
Age: 75
End: 2020-12-21

## 2020-12-22 ENCOUNTER — APPOINTMENT (OUTPATIENT)
Dept: HEART AND VASCULAR | Facility: CLINIC | Age: 75
End: 2020-12-22
Payer: COMMERCIAL

## 2020-12-22 ENCOUNTER — NON-APPOINTMENT (OUTPATIENT)
Age: 75
End: 2020-12-22

## 2020-12-22 VITALS
TEMPERATURE: 98.9 F | SYSTOLIC BLOOD PRESSURE: 140 MMHG | WEIGHT: 164.99 LBS | BODY MASS INDEX: 27.49 KG/M2 | HEIGHT: 65 IN | DIASTOLIC BLOOD PRESSURE: 84 MMHG | OXYGEN SATURATION: 97 % | HEART RATE: 83 BPM

## 2020-12-22 PROCEDURE — 99072 ADDL SUPL MATRL&STAF TM PHE: CPT

## 2020-12-22 PROCEDURE — 93000 ELECTROCARDIOGRAM COMPLETE: CPT

## 2020-12-22 PROCEDURE — 99214 OFFICE O/P EST MOD 30 MIN: CPT

## 2020-12-22 NOTE — ASSESSMENT
[FreeTextEntry1] : HTN-  BP high here, increased Cardizem ER to 60 BID.   Also high with Dr Shrestha.  BP slightly up. No changes, meds renewed.  BP better due to pt taking Diltiazem BID as directed, not once daily. \par \par SOB/CONTI- Needs stress echo, cardiomegaly reported on a recent CXR. After test will increase BP meds\par \par Near syncope Sept 6 and then a fall Oct 16.  Both episodes ended with pt  in the ER. To see Dr Dwyer for pacer interrogation. Pt had NL echoes in 2013, 14 and 16.\par \par Prediabetes-  Wt has been fluctuating.  Labs done in ER all excellent glucose 121.  A1c 5.8. \par \par HLD- Not on a statin, will reconsider.   

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
## 2020-12-22 NOTE — PHYSICAL EXAM
[General Appearance - Well Developed] : well developed [Normal Appearance] : normal appearance [Well Groomed] : well groomed [General Appearance - Well Nourished] : well nourished [No Deformities] : no deformities [General Appearance - In No Acute Distress] : no acute distress [Normal Conjunctiva] : the conjunctiva exhibited no abnormalities [Eyelids - No Xanthelasma] : the eyelids demonstrated no xanthelasmas [Respiration, Rhythm And Depth] : normal respiratory rhythm and effort [Exaggerated Use Of Accessory Muscles For Inspiration] : no accessory muscle use [Auscultation Breath Sounds / Voice Sounds] : lungs were clear to auscultation bilaterally [Heart Rate And Rhythm] : heart rate and rhythm were normal [Heart Sounds] : normal S1 and S2 [Murmurs] : no murmurs present [Abdomen Soft] : soft [Abdomen Tenderness] : non-tender [Abdomen Mass (___ Cm)] : no abdominal mass palpated [Abnormal Walk] : normal gait [Gait - Sufficient For Exercise Testing] : the gait was sufficient for exercise testing [Nail Clubbing] : no clubbing of the fingernails [Cyanosis, Localized] : no localized cyanosis [Petechial Hemorrhages (___cm)] : no petechial hemorrhages [Skin Color & Pigmentation] : normal skin color and pigmentation [] : no rash [No Venous Stasis] : no venous stasis [Skin Lesions] : no skin lesions [No Skin Ulcers] : no skin ulcer [No Xanthoma] : no  xanthoma was observed [Oriented To Time, Place, And Person] : oriented to person, place, and time [Affect] : the affect was normal [Mood] : the mood was normal [No Anxiety] : not feeling anxious [FreeTextEntry1] : No JVD or bruits

## 2020-12-30 ENCOUNTER — NON-APPOINTMENT (OUTPATIENT)
Age: 75
End: 2020-12-30

## 2021-01-01 NOTE — ED PROVIDER NOTE - OBSERVING MD:
riley [Alert] : alert [Acute Distress] : no acute distress [Normocephalic] : not normocephalic [Flat Open Anterior Bryan] : flat open anterior fontanelle [PERRL] : PERRL [Red Reflex Bilateral] : red reflex bilateral [Normally Placed Ears] : normally placed ears [Auricles Well Formed] : auricles well formed [Clear Tympanic membranes] : clear tympanic membranes [Light reflex present] : light reflex present [Bony landmarks visible] : bony landmarks visible [Discharge] : no discharge [Nares Patent] : nares patent [Palate Intact] : palate intact [Uvula Midline] : uvula midline [Supple, full passive range of motion] : supple, full passive range of motion [Palpable Masses] : no palpable masses [Symmetric Chest Rise] : symmetric chest rise [Clear to Auscultation Bilaterally] : clear to auscultation bilaterally [Regular Rate and Rhythm] : regular rate and rhythm [S1, S2 present] : S1, S2 present [Murmurs] : no murmurs [+2 Femoral Pulses] : +2 femoral pulses [Soft] : soft [Tender] : nontender [Distended] : not distended [Bowel Sounds] : bowel sounds present [Hepatomegaly] : no hepatomegaly [Splenomegaly] : no splenomegaly [Normal external genitailia] : normal external genitalia [Clitoromegaly] : no clitoromegaly [Patent Vagina] : vagina patent [Normally Placed] : normally placed [No Abnormal Lymph Nodes Palpated] : no abnormal lymph nodes palpated [Maldonado-Ortolani] : negative Maldonado-Ortolani [Symmetric Flexed Extremities] : symmetric flexed extremities [Spinal Dimple] : no spinal dimple [Tuft of Hair] : no tuft of hair [Startle Reflex] : startle reflex present [Suck Reflex] : suck reflex present [Rooting] : rooting reflex present [Palmar Grasp] : palmar grasp reflex present [Plantar Grasp] : plantar grasp reflex present [Symmetric Becky] : symmetric [place] [Rash and/or lesion present] : no rash/lesion [FreeTextEntry2] : Right Parietal/occipital bony mass  [FreeTextEntry9] : Moderate size umbilical hernia

## 2021-01-12 ENCOUNTER — APPOINTMENT (OUTPATIENT)
Dept: HEART AND VASCULAR | Facility: CLINIC | Age: 76
End: 2021-01-12
Payer: COMMERCIAL

## 2021-01-12 PROCEDURE — 93325 DOPPLER ECHO COLOR FLOW MAPG: CPT | Mod: 59

## 2021-01-12 PROCEDURE — 93320 DOPPLER ECHO COMPLETE: CPT

## 2021-01-12 PROCEDURE — 99072 ADDL SUPL MATRL&STAF TM PHE: CPT

## 2021-01-12 PROCEDURE — 93351 STRESS TTE COMPLETE: CPT

## 2021-01-28 ENCOUNTER — APPOINTMENT (OUTPATIENT)
Dept: CT IMAGING | Facility: CLINIC | Age: 76
End: 2021-01-28
Payer: COMMERCIAL

## 2021-01-28 ENCOUNTER — OUTPATIENT (OUTPATIENT)
Dept: OUTPATIENT SERVICES | Facility: HOSPITAL | Age: 76
LOS: 1 days | End: 2021-01-28
Payer: COMMERCIAL

## 2021-01-28 DIAGNOSIS — Z96.641 PRESENCE OF RIGHT ARTIFICIAL HIP JOINT: Chronic | ICD-10-CM

## 2021-01-28 DIAGNOSIS — Z00.8 ENCOUNTER FOR OTHER GENERAL EXAMINATION: ICD-10-CM

## 2021-01-28 DIAGNOSIS — Z95.0 PRESENCE OF CARDIAC PACEMAKER: Chronic | ICD-10-CM

## 2021-01-28 PROCEDURE — 70450 CT HEAD/BRAIN W/O DYE: CPT

## 2021-01-28 PROCEDURE — 70450 CT HEAD/BRAIN W/O DYE: CPT | Mod: 26

## 2021-02-03 ENCOUNTER — APPOINTMENT (OUTPATIENT)
Dept: ORTHOPEDIC SURGERY | Facility: CLINIC | Age: 76
End: 2021-02-03
Payer: COMMERCIAL

## 2021-02-03 VITALS — TEMPERATURE: 97.2 F

## 2021-02-03 PROCEDURE — 99214 OFFICE O/P EST MOD 30 MIN: CPT

## 2021-02-03 PROCEDURE — 99072 ADDL SUPL MATRL&STAF TM PHE: CPT

## 2021-02-04 LAB
BUN SERPL-MCNC: 17 MG/DL
CREAT SERPL-MCNC: 0.95 MG/DL

## 2021-02-09 NOTE — PHYSICAL EXAM
[de-identified] : Physical Exam:\par \par General: patient is well developed, well nourished, in no acute \par distress, alert and oriented x 3. \par \par Mood and affect: normal\par \par Respiratory: no respiratory distress noted\par \par Skin: no scars over spine, skin intact, no erythema, increased warmth\par \par Alignment:The spine is well compensated in the coronal and sagittal plane. There is no asymmetry on Diaz Forward Bend Test.\par \par Gait: The patient is able to toe walk and heel walk without difficulty. The patient is able to tandem walk without difficulty.\par \par Palpation: no tenderness to palpation midline along spine or paraspinal region\par \par Range of motion: Cervical ROM is restricted due to pain\par \par Neurologic Exam:\par Motor: Manual Muscle testing in the upper and lower extremities is 5 out of 5 in all muscle groups. There is no evidence of muscular atrophy in the upper extremities. Sensory: Sensation to light touch is grossly intact in the upper and lower extremities\par \par Reflexes: DTR are present and symmetric throughout, negative starr bilaterally, negative inverted radial reflex bilaterally, no clonus, plantar responses are flexor\par \par Special tests: Spurlings sign absent. Lhermitte's sign is absent. Straight Leg Raise Negative, Cross Straight Leg Raise Negative, ROBIN Test Negative\par \par Hip Exam: Full painless ROM of bilateral hips\par \par Vascular: Examination of the peripheral vascular system demonstrates no evidence of congestion or edema. no lymphedema bilateral lower extremities, pulses are present and symmetric in both lower extremities.\par \par \par  [de-identified] : CT cervical 9/2020: multilevel disc osteophytes and facet arthropathy; severe foraminal stenosis bilaterally C4/C5, left C3/C4, no acute fractures\par \par XR cervical 9/23/20: multilevel degenerative changes, no dynamic instability, no acute fractures\par \par XR pelvis 3/3/20: s/p right RUBIN, hardware intact, no evidence of fracture or dislocation\par \par XR lumbar 3/3/20: multilevel degenerative changes, no spondylolisthesis, no fractures seen\par \par XR cervical 8/15/18: moderate multilevel degenerative changes, no spondylolisthesis, no acute fractures\par \par CT 8/8/18: Severe bilateral C4/5 foraminal stenosis, with osseous fragment within the left neural foramen, moderate-severe multilevel bilateral foraminal stenosis; no central canal stenosis; no large disc herniations, no spondylolisthesis. \par \par CT myelogram 9/2018: severe left and mod to severe right C4/5 foraminal stenosis; C5/6 moderate central stenosis without signicant cord compression. Bilateral C5/6 and C6/7 foraminal stenosis. Extensive disc degeneration.

## 2021-02-09 NOTE — DISCUSSION/SUMMARY
[de-identified] : Discussed the results of the patient's history, physical exam, and imaging. Patient reports worsening bilateral upper and lower extremity radicular pain. Has an ICD, unable to get an MRI. I am recommending a CT myelogram cervical, order given. Orders for preprocedural blood work also given. have also recommended a nonsteroidal anti-inflammatory, etodolac, to be taken twice daily for 2 weeks.  If this is helpful I have instructed the patient to take it for an additional 2 weeks and then PRN afterwards.  A prescription for etodolac was given. The patient will follow up with me after imaging is completed, sooner if there is an issue.  All questions were answered.\par \par
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
## 2021-02-24 ENCOUNTER — NON-APPOINTMENT (OUTPATIENT)
Age: 76
End: 2021-02-24

## 2021-02-24 ENCOUNTER — APPOINTMENT (OUTPATIENT)
Dept: HEART AND VASCULAR | Facility: CLINIC | Age: 76
End: 2021-02-24
Payer: COMMERCIAL

## 2021-02-24 PROCEDURE — 93294 REM INTERROG EVL PM/LDLS PM: CPT

## 2021-02-24 PROCEDURE — 93296 REM INTERROG EVL PM/IDS: CPT

## 2021-03-08 ENCOUNTER — APPOINTMENT (OUTPATIENT)
Dept: ULTRASOUND IMAGING | Facility: CLINIC | Age: 76
End: 2021-03-08
Payer: COMMERCIAL

## 2021-03-08 ENCOUNTER — OUTPATIENT (OUTPATIENT)
Dept: OUTPATIENT SERVICES | Facility: HOSPITAL | Age: 76
LOS: 1 days | End: 2021-03-08

## 2021-03-08 ENCOUNTER — RESULT REVIEW (OUTPATIENT)
Age: 76
End: 2021-03-08

## 2021-03-08 DIAGNOSIS — Z96.641 PRESENCE OF RIGHT ARTIFICIAL HIP JOINT: Chronic | ICD-10-CM

## 2021-03-08 DIAGNOSIS — Z95.0 PRESENCE OF CARDIAC PACEMAKER: Chronic | ICD-10-CM

## 2021-03-08 PROCEDURE — 76642 ULTRASOUND BREAST LIMITED: CPT | Mod: 26,RT

## 2021-03-17 ENCOUNTER — NON-APPOINTMENT (OUTPATIENT)
Age: 76
End: 2021-03-17

## 2021-04-05 ENCOUNTER — NON-APPOINTMENT (OUTPATIENT)
Age: 76
End: 2021-04-05

## 2021-04-06 ENCOUNTER — APPOINTMENT (OUTPATIENT)
Dept: HEART AND VASCULAR | Facility: CLINIC | Age: 76
End: 2021-04-06

## 2021-04-06 ENCOUNTER — NON-APPOINTMENT (OUTPATIENT)
Age: 76
End: 2021-04-06

## 2021-04-06 VITALS — DIASTOLIC BLOOD PRESSURE: 75 MMHG | HEART RATE: 67 BPM | SYSTOLIC BLOOD PRESSURE: 140 MMHG | TEMPERATURE: 97.7 F

## 2021-04-07 ENCOUNTER — NON-APPOINTMENT (OUTPATIENT)
Age: 76
End: 2021-04-07

## 2021-04-07 NOTE — HISTORY OF PRESENT ILLNESS
[FreeTextEntry1] : 73 y/o F HTN, HLD, syncope, sinus node dysfunction s/p PPM placement 2005 ([facility]- Dr. Mondragon), now s/p generator change 11/27/2019.  Prior episodes of noise on RV lead; she is not PPM dependent.\par \par \par H/O syncope following PPM placement after initiation of Norvasc (2005).  She notes presyncopal events as well.  Notes rare episodes of palpitations- describes a "beat that grabs me" and then lets go.  No sustained rapid heart action.  No CP, SOB/CONTI, dizziness, presyncope/syncope.  \par \par Presents today for device assessment as we received remote monitoring alert for atrial lead impedance > 3000 ohms and noise on the lead.\par

## 2021-04-07 NOTE — REASON FOR VISIT
[Follow-up Device Check] : is here today for a follow-up device check visit for [Pacemaker Evaluation] : pacemaker ~T evaluation ~C was performed

## 2021-04-13 ENCOUNTER — RESULT REVIEW (OUTPATIENT)
Age: 76
End: 2021-04-13

## 2021-04-13 ENCOUNTER — OUTPATIENT (OUTPATIENT)
Dept: OUTPATIENT SERVICES | Facility: HOSPITAL | Age: 76
LOS: 1 days | End: 2021-04-13
Payer: COMMERCIAL

## 2021-04-13 ENCOUNTER — APPOINTMENT (OUTPATIENT)
Dept: RADIOLOGY | Facility: CLINIC | Age: 76
End: 2021-04-13

## 2021-04-13 DIAGNOSIS — Z96.641 PRESENCE OF RIGHT ARTIFICIAL HIP JOINT: Chronic | ICD-10-CM

## 2021-04-13 DIAGNOSIS — Z95.0 PRESENCE OF CARDIAC PACEMAKER: Chronic | ICD-10-CM

## 2021-04-13 PROCEDURE — 71046 X-RAY EXAM CHEST 2 VIEWS: CPT | Mod: 26

## 2021-04-19 NOTE — PROCEDURE
[FreeTextEntry3] : I have fully participated in the care of this patient. I have reviewed all pertinent clinical information, including history, physical exam, plan and the note and I agree.  [No] : not [NSR] : normal sinus rhythm [Pacemaker] : pacemaker [VVI] : VVI [Lead Imp:  ___ohms] : lead impedance was [unfilled] ohms [Sensing Amplitude ___mv] : sensing amplitude was [unfilled] mv [___V @] : [unfilled] V [___ ms] : [unfilled] ms [None] : none [de-identified] : [place] [facility] [de-identified] : [employer] MRI [de-identified] : [id number] [de-identified] : 11/27/2019 [de-identified] :  [de-identified] : 15 years [de-identified] : AP 3%\par  <1%\par

## 2021-04-20 ENCOUNTER — NON-APPOINTMENT (OUTPATIENT)
Age: 76
End: 2021-04-20

## 2021-05-26 ENCOUNTER — NON-APPOINTMENT (OUTPATIENT)
Age: 76
End: 2021-05-26

## 2021-05-26 ENCOUNTER — APPOINTMENT (OUTPATIENT)
Dept: HEART AND VASCULAR | Facility: CLINIC | Age: 76
End: 2021-05-26
Payer: COMMERCIAL

## 2021-05-26 PROCEDURE — 93294 REM INTERROG EVL PM/LDLS PM: CPT

## 2021-05-26 PROCEDURE — 93296 REM INTERROG EVL PM/IDS: CPT

## 2021-08-18 ENCOUNTER — APPOINTMENT (OUTPATIENT)
Dept: HEART AND VASCULAR | Facility: CLINIC | Age: 76
End: 2021-08-18
Payer: COMMERCIAL

## 2021-08-18 VITALS
HEART RATE: 74 BPM | SYSTOLIC BLOOD PRESSURE: 143 MMHG | BODY MASS INDEX: 25.59 KG/M2 | TEMPERATURE: 98.2 F | HEIGHT: 67.5 IN | DIASTOLIC BLOOD PRESSURE: 82 MMHG | WEIGHT: 164.99 LBS | OXYGEN SATURATION: 97 %

## 2021-08-18 PROCEDURE — 36415 COLL VENOUS BLD VENIPUNCTURE: CPT

## 2021-08-18 PROCEDURE — 99213 OFFICE O/P EST LOW 20 MIN: CPT
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
## 2021-08-18 NOTE — PHYSICAL EXAM
[General Appearance - Well Developed] : well developed [Normal Appearance] : normal appearance [Well Groomed] : well groomed [General Appearance - Well Nourished] : well nourished [No Deformities] : no deformities [General Appearance - In No Acute Distress] : no acute distress [Normal Conjunctiva] : the conjunctiva exhibited no abnormalities [Eyelids - No Xanthelasma] : the eyelids demonstrated no xanthelasmas [Respiration, Rhythm And Depth] : normal respiratory rhythm and effort [Exaggerated Use Of Accessory Muscles For Inspiration] : no accessory muscle use [Auscultation Breath Sounds / Voice Sounds] : lungs were clear to auscultation bilaterally [Heart Rate And Rhythm] : heart rate and rhythm were normal [Heart Sounds] : normal S1 and S2 [Murmurs] : no murmurs present [Abdomen Soft] : soft [Abdomen Tenderness] : non-tender [Abdomen Mass (___ Cm)] : no abdominal mass palpated [Abnormal Walk] : normal gait [Gait - Sufficient For Exercise Testing] : the gait was sufficient for exercise testing [Nail Clubbing] : no clubbing of the fingernails [Cyanosis, Localized] : no localized cyanosis [Petechial Hemorrhages (___cm)] : no petechial hemorrhages [Skin Color & Pigmentation] : normal skin color and pigmentation [] : no rash [No Venous Stasis] : no venous stasis [Skin Lesions] : no skin lesions [No Skin Ulcers] : no skin ulcer [Oriented To Time, Place, And Person] : oriented to person, place, and time [No Xanthoma] : no  xanthoma was observed [Affect] : the affect was normal [Mood] : the mood was normal [No Anxiety] : not feeling anxious [FreeTextEntry1] : No JVD or bruits

## 2021-08-18 NOTE — ASSESSMENT
[FreeTextEntry1] : HTN-  BP high here, increased Cardizem ER to 60 BID.   Also high with Dr Shrestha.  BP slightly up. No changes, meds renewed.  BP better due to pt taking Diltiazem BID as directed, not once daily.   Taking it BID, will wait until next  visit in 4-6 mos will consider HCT\par \par SOB/CONTI- Needs stress echo, cardiomegaly reported on a recent CXR. NL 1/2021. After test will increase BP meds\par \par Near syncope Sept 6 and then a fall Oct 16.  Both episodes ended with pt  in the ER. To see Dr Dwyer for pacer interrogation. Pt had NL echoes in 2013, 14 and 16.\par \par Prediabetes-  Wt has been fluctuating.  Labs done in ER all excellent glucose 121.  A1c 5.8. Labs were drawn today in Office. \par \par HLD- Not on a statin, will reconsider.   then Lipitor started.  Labs were drawn today in Office. 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

## 2021-08-19 LAB
ALBUMIN SERPL ELPH-MCNC: 4.7 G/DL
ALP BLD-CCNC: 105 U/L
ALT SERPL-CCNC: 20 U/L
ANION GAP SERPL CALC-SCNC: 12 MMOL/L
AST SERPL-CCNC: 18 U/L
BASOPHILS # BLD AUTO: 0.06 K/UL
BASOPHILS NFR BLD AUTO: 1.3 %
BILIRUB SERPL-MCNC: 1.2 MG/DL
BUN SERPL-MCNC: 10 MG/DL
CALCIUM SERPL-MCNC: 9.4 MG/DL
CHLORIDE SERPL-SCNC: 104 MMOL/L
CHOLEST SERPL-MCNC: 150 MG/DL
CO2 SERPL-SCNC: 26 MMOL/L
CREAT SERPL-MCNC: 0.85 MG/DL
EOSINOPHIL # BLD AUTO: 0.12 K/UL
EOSINOPHIL NFR BLD AUTO: 2.7 %
ESTIMATED AVERAGE GLUCOSE: 123 MG/DL
GLUCOSE SERPL-MCNC: 95 MG/DL
HBA1C MFR BLD HPLC: 5.9 %
HCT VFR BLD CALC: 40.2 %
HDLC SERPL-MCNC: 58 MG/DL
HGB BLD-MCNC: 13 G/DL
IMM GRANULOCYTES NFR BLD AUTO: 0.2 %
LDLC SERPL CALC-MCNC: 63 MG/DL
LYMPHOCYTES # BLD AUTO: 1.51 K/UL
LYMPHOCYTES NFR BLD AUTO: 33.9 %
MAN DIFF?: NORMAL
MCHC RBC-ENTMCNC: 29.3 PG
MCHC RBC-ENTMCNC: 32.3 GM/DL
MCV RBC AUTO: 90.5 FL
MONOCYTES # BLD AUTO: 0.3 K/UL
MONOCYTES NFR BLD AUTO: 6.7 %
NEUTROPHILS # BLD AUTO: 2.45 K/UL
NEUTROPHILS NFR BLD AUTO: 55.2 %
NONHDLC SERPL-MCNC: 92 MG/DL
PLATELET # BLD AUTO: 283 K/UL
POTASSIUM SERPL-SCNC: 4.1 MMOL/L
PROT SERPL-MCNC: 6.6 G/DL
RBC # BLD: 4.44 M/UL
RBC # FLD: 13 %
SODIUM SERPL-SCNC: 142 MMOL/L
TRIGL SERPL-MCNC: 146 MG/DL
WBC # FLD AUTO: 4.45 K/UL

## 2021-08-25 ENCOUNTER — APPOINTMENT (OUTPATIENT)
Dept: HEART AND VASCULAR | Facility: CLINIC | Age: 76
End: 2021-08-25
Payer: COMMERCIAL

## 2021-08-25 ENCOUNTER — NON-APPOINTMENT (OUTPATIENT)
Age: 76
End: 2021-08-25

## 2021-08-25 PROCEDURE — 93296 REM INTERROG EVL PM/IDS: CPT

## 2021-08-25 PROCEDURE — 93294 REM INTERROG EVL PM/LDLS PM: CPT

## 2021-09-07 ENCOUNTER — NON-APPOINTMENT (OUTPATIENT)
Age: 76
End: 2021-09-07

## 2021-09-07 ENCOUNTER — APPOINTMENT (OUTPATIENT)
Dept: HEART AND VASCULAR | Facility: CLINIC | Age: 76
End: 2021-09-07
Payer: COMMERCIAL

## 2021-09-07 VITALS
SYSTOLIC BLOOD PRESSURE: 117 MMHG | DIASTOLIC BLOOD PRESSURE: 71 MMHG | HEIGHT: 67.5 IN | TEMPERATURE: 97.2 F | HEART RATE: 71 BPM | BODY MASS INDEX: 25.44 KG/M2 | WEIGHT: 164 LBS

## 2021-09-07 PROCEDURE — 93280 PM DEVICE PROGR EVAL DUAL: CPT

## 2021-09-07 NOTE — HISTORY OF PRESENT ILLNESS
[FreeTextEntry1] : 73 y/o F HTN, HLD, syncope, sinus node dysfunction s/p PPM placement 2005 ([facility]- Dr. Mondragon), now s/p generator change 11/27/2019.  Noise noted on RA and RV lead, reproducible with AROM in the office.  Sensitivity decreased on RA and RV leads.  Prior remote monitoring alert for atrial lead impedance > 3000 ohms.  She is programmed DDI 40 to evaluate for pacing requirements.\par \par H/O syncope following PPM placement after initiation of Norvasc (2005).  She notes presyncopal events as well.  Notes rare episodes of palpitations- describes a "beat that grabs me" and then lets go.  No sustained rapid heart action.  No CP, SOB/CONTI, dizziness, presyncope/syncope.  \par \par SEE PACEART interrogation\par

## 2021-09-07 NOTE — PROCEDURE
[No] : not [NSR] : normal sinus rhythm [Pacemaker] : pacemaker [VVI] : VVI [Lead Imp:  ___ohms] : lead impedance was [unfilled] ohms [Sensing Amplitude ___mv] : sensing amplitude was [unfilled] mv [___V @] : [unfilled] V [___ ms] : [unfilled] ms [None] : none [de-identified] : [employer] [de-identified] : [place] [facility] [de-identified] : [id number] [de-identified] : 11/27/2019 [de-identified] :  [de-identified] : 15 years [de-identified] : AP 3%\par  <1%\par

## 2021-09-30 ENCOUNTER — NON-APPOINTMENT (OUTPATIENT)
Age: 76
End: 2021-09-30

## 2021-10-05 ENCOUNTER — NON-APPOINTMENT (OUTPATIENT)
Age: 76
End: 2021-10-05

## 2021-10-05 ENCOUNTER — APPOINTMENT (OUTPATIENT)
Dept: OPHTHALMOLOGY | Facility: CLINIC | Age: 76
End: 2021-10-05
Payer: COMMERCIAL

## 2021-10-05 PROCEDURE — 92004 COMPRE OPH EXAM NEW PT 1/>: CPT

## 2021-10-25 ENCOUNTER — APPOINTMENT (OUTPATIENT)
Dept: INTERNAL MEDICINE | Facility: CLINIC | Age: 76
End: 2021-10-25
Payer: COMMERCIAL

## 2021-10-25 VITALS
HEIGHT: 67.5 IN | DIASTOLIC BLOOD PRESSURE: 78 MMHG | SYSTOLIC BLOOD PRESSURE: 128 MMHG | OXYGEN SATURATION: 98 % | HEART RATE: 88 BPM | TEMPERATURE: 96.8 F | BODY MASS INDEX: 24.82 KG/M2 | WEIGHT: 160 LBS

## 2021-10-25 PROCEDURE — 99387 INIT PM E/M NEW PAT 65+ YRS: CPT

## 2021-10-25 NOTE — PLAN
[FreeTextEntry1] : Wellness complete\par labs reviewed from Dr North\par \par HTN well controlled\par HLD- cont statin\par Syncope - with PPM - will f/up with Dr Russell\par colonoscopy referral\par derm referral

## 2021-10-25 NOTE — HISTORY OF PRESENT ILLNESS
[de-identified] : 74 y/o F HTN, HLD, syncope, sinus node dysfunction s/p PPM placement 2005, pre-DM\par \par Cervical DJD - following with Dr Hurley\par OA left hip - has seen Dr Desai in the past   hx of right THR -\par - Left thigh mildly larger than right thigh.  \par \par Had fluvax.\par had covid vaccine #3 \par \par Nocturia - has been an issue for years\par Mammo: last year- f/up q 6 months - \par Due for derm and colonoscopy had ophtho eval

## 2021-11-10 ENCOUNTER — RESULT REVIEW (OUTPATIENT)
Age: 76
End: 2021-11-10

## 2021-11-10 ENCOUNTER — OUTPATIENT (OUTPATIENT)
Dept: OUTPATIENT SERVICES | Facility: HOSPITAL | Age: 76
LOS: 1 days | End: 2021-11-10

## 2021-11-10 ENCOUNTER — APPOINTMENT (OUTPATIENT)
Dept: ULTRASOUND IMAGING | Facility: CLINIC | Age: 76
End: 2021-11-10
Payer: COMMERCIAL

## 2021-11-10 ENCOUNTER — APPOINTMENT (OUTPATIENT)
Dept: MAMMOGRAPHY | Facility: CLINIC | Age: 76
End: 2021-11-10
Payer: COMMERCIAL

## 2021-11-10 DIAGNOSIS — Z95.0 PRESENCE OF CARDIAC PACEMAKER: Chronic | ICD-10-CM

## 2021-11-10 DIAGNOSIS — Z96.641 PRESENCE OF RIGHT ARTIFICIAL HIP JOINT: Chronic | ICD-10-CM

## 2021-11-10 PROCEDURE — 76641 ULTRASOUND BREAST COMPLETE: CPT | Mod: 26,50

## 2021-11-10 PROCEDURE — 77063 BREAST TOMOSYNTHESIS BI: CPT | Mod: 26

## 2021-11-10 PROCEDURE — 77067 SCR MAMMO BI INCL CAD: CPT | Mod: 26

## 2021-11-11 ENCOUNTER — NON-APPOINTMENT (OUTPATIENT)
Age: 76
End: 2021-11-11

## 2021-11-19 ENCOUNTER — NON-APPOINTMENT (OUTPATIENT)
Age: 76
End: 2021-11-19

## 2021-12-06 ENCOUNTER — RX RENEWAL (OUTPATIENT)
Age: 76
End: 2021-12-06

## 2021-12-07 ENCOUNTER — APPOINTMENT (OUTPATIENT)
Dept: HEART AND VASCULAR | Facility: CLINIC | Age: 76
End: 2021-12-07
Payer: COMMERCIAL

## 2021-12-07 VITALS
WEIGHT: 160 LBS | HEART RATE: 96 BPM | TEMPERATURE: 96.9 F | SYSTOLIC BLOOD PRESSURE: 144 MMHG | DIASTOLIC BLOOD PRESSURE: 88 MMHG | BODY MASS INDEX: 24.82 KG/M2 | HEIGHT: 67.5 IN

## 2021-12-07 PROCEDURE — 93280 PM DEVICE PROGR EVAL DUAL: CPT

## 2021-12-08 NOTE — PROCEDURE
[No] : not [NSR] : normal sinus rhythm [Pacemaker] : pacemaker [VVI] : VVI [Lead Imp:  ___ohms] : lead impedance was [unfilled] ohms [Sensing Amplitude ___mv] : sensing amplitude was [unfilled] mv [___V @] : [unfilled] V [___ ms] : [unfilled] ms [None] : none [de-identified] : [place] [facility] [de-identified] : [employer] [de-identified] : [id number] [de-identified] : 11/27/2019 [de-identified] :  [de-identified] : 14 years [de-identified] : AP <1\par  <1\par 2 episodes of noise on A & V

## 2021-12-08 NOTE — HISTORY OF PRESENT ILLNESS
[FreeTextEntry1] : 77 y/o F HTN, HLD, syncope, sinus node dysfunction s/p PPM placement 2005 ([facility]- Dr. Mondragon), now s/p generator change 11/27/2019.  Noise noted on RA and RV lead, reproducible with AROM in the office.  Sensitivity decreased on RA and RV leads.  Prior remote monitoring alert for atrial lead impedance > 3000 ohms.  She is programmed DDI 40 to evaluate for pacing requirements.\par \par H/O syncope following PPM placement after initiation of Norvasc (2005).  No recurrent palpitations, presyncope/syncope.  No sustained rapid heart action.  No CP, SOB/CONTI, dizziness, presyncope/syncope.  \par \par \par

## 2022-01-03 ENCOUNTER — RX RENEWAL (OUTPATIENT)
Age: 77
End: 2022-01-03

## 2022-01-05 ENCOUNTER — APPOINTMENT (OUTPATIENT)
Dept: BREAST CENTER | Facility: CLINIC | Age: 77
End: 2022-01-05

## 2022-01-05 NOTE — DATA REVIEWED
[FreeTextEntry1] : 11/10/2020 bilateral mammogram/ultrasound: showing breasts are heterogeneously dense. There is an extremely small benign-appearing solid nodule in the right breast 10:00 8 cm from the nipple measuring 4 x 2 x 7 mm, likely an incidental fibroadenoma, not previously documented on prior ultrasounds dating back to 7/2014, which therefore meets criteria for follow-up.  IMPRESSION: No mammographic/sonographic evidence of malignancy. Recommend annual follow-up of an extremely benign-appearing nodule in the right breast 10:00 8 cm from the nipple, to optimally document two-year stability, which can be performed at the time of next annual mammogram, due in 11/2021. RECOMMENDATION: Mammography and ultrasound in 1 year. BI-RADS 3.\par \par \par 11/10/2020 bilateral mammogram/ultrasound: showing breasts are heterogeneously dense. There is an extremely small benign-appearing solid nodule in the right breast 10:00 8 cm from the nipple measuring 4 x 2 x 7 mm, likely an incidental fibroadenoma, not previously documented on prior ultrasounds dating back to 7/2014, which therefore meets criteria for follow-up. IMPRESSION: No mammographic/sonographic evidence of malignancy. Recommend annual follow-up of an extremely benign-appearing nodule in the right breast 10:00 8 cm from the nipple, to optimally document two-year stability, which can be performed at the time of next annual mammogram, due in 11/2021. RECOMMENDATION: Mammography and ultrasound in 1 year. BI-RADS 3. \par \par 3/8/2021 ([facility]) right breast US showing right breast demonstrating no interval change in a benign-appearing solid nodule at 10:00 8 cm and the nipple measuring 5 x 7 x 2 mm, stable since 11/2020 which meets criteria for additional follow-up. Probably benign BIRADS 3 \par \par 11/10/2021 ([facility]) bilateral mammogram/US showing breasts are heterogeneously dense, No suspicious mass, microcalcification or distortion.Sonographic survey was performed of the entirety of both breasts, including all quadrants and the retroareolar region, demonstrating no suspicious finding. There is a benign-appearing solid nodule in the right breast at 10:00 8 cm from the nipple measuring 7 x 3 x 4 mm, stable since 11/2020, demonstrating annual stability, which meets criteria for additional follow-up. There is heterogeneous background echotexture with predominantly dense underlying glandular tissue. IMPRESSION: Recommend annual sonographic follow-up of a benign-appearing solid nodule in the right breast to optimally document two-year stability, which can be performed at time of next annual mammogram, due in 11/2022. Probably benign BIRADS 3 Mammography and ultrasound in 1 year \par

## 2022-01-05 NOTE — PAST MEDICAL HISTORY
[Postmenopausal] : The patient is postmenopausal [Menarche Age ____] : age at menarche was [unfilled] [Menopause Age____] : age at menopause was [unfilled] [History of Hormone Replacement Treatment] : has no history of hormone replacement treatment [Approximately ___] : the LMP was approximately [unfilled] [Total Preg ___] : G[unfilled] [Live Births ___] : P[unfilled]  [Living ___] : Living: [unfilled] [Age At Live Birth ___] : Age at live birth: [unfilled] [FreeTextEntry5] : Advanced laparoscopic robotic assisted hysterectomy, BSO 8/23/16 [FreeTextEntry6] : None [FreeTextEntry7] : None [FreeTextEntry8] : 9 months

## 2022-01-05 NOTE — REASON FOR VISIT
[Follow-Up: _____] : a [unfilled] follow-up visit [FreeTextEntry1] : right breast nodule 10:00 8cmFN, small at 4 x 2 x 7mm

## 2022-01-05 NOTE — ASSESSMENT
[FreeTextEntry1] : 75 year female with new right breast nodule 10:00 8cmFN, small at 4 x 2 x 7mm found on screening ultrasound.  ADRIAN 10.2%\par \par Discussed given her age of 75 with a new right breast lesion recommend a targeted right breast ultrasound with possible ultrasound guided biopsy in 3 months. \par \par Discussed the genetic implications of breast cancer and the role of genetic testing and associated implications for family members. Patient will proceed with genetic testing today via [employer]. \par

## 2022-01-19 ENCOUNTER — APPOINTMENT (OUTPATIENT)
Dept: BREAST CENTER | Facility: CLINIC | Age: 77
End: 2022-01-19
Payer: COMMERCIAL

## 2022-01-19 VITALS — OXYGEN SATURATION: 98 % | HEART RATE: 86 BPM | HEIGHT: 67.5 IN | BODY MASS INDEX: 24.82 KG/M2 | WEIGHT: 160 LBS

## 2022-01-19 PROCEDURE — 99213 OFFICE O/P EST LOW 20 MIN: CPT

## 2022-01-19 NOTE — REASON FOR VISIT
[Follow-Up: _____] : a [unfilled] follow-up visit [FreeTextEntry1] : Stable R breast nodule found on imaging

## 2022-01-19 NOTE — HISTORY OF PRESENT ILLNESS
[FreeTextEntry1] : Shilpi is a 76 year F who presents for follow up of a right breast nodule 10:00 8cmFN, small at 4 x 2 x 7mm, likely an incidental fibroadenoma found on screening ultrasound. Has a family history of sister with breast CA  @ age 30, alive and well. Denies any palpable abnormalities, no skin changes, no nipple discharge bilaterally. ADRIAN 10.2%\par \par Of note, she is known by Dr. Patiño for an ovarian cyst and underwent a laparoscopic robotic assisted hysterectomy, BSO in 8/23/16. \par \par Shilpi has a cardiac pacemaker, she is unable to undergo MRI's.

## 2022-01-19 NOTE — ASSESSMENT
[FreeTextEntry1] : 75 yo F presents for follow up of breast nodule on imaging, stable since 2020. Patient has no complaints, physical exam WNL. Patient will return for MMG/US and reexamination in November 2022. Patient verbalized understanding and agreement of plan.\par

## 2022-01-19 NOTE — DATA REVIEWED
[FreeTextEntry1] : 11/10/2020 bilateral mammogram/ultrasound: showing breasts are heterogeneously dense. There is an extremely small benign-appearing solid nodule in the right breast 10:00 8 cm from the nipple measuring 4 x 2 x 7 mm, likely an incidental fibroadenoma, not previously documented on prior ultrasounds dating back to 7/2014, which therefore meets criteria for follow-up. IMPRESSION: No mammographic/sonographic evidence of malignancy. Recommend annual follow-up of an extremely benign-appearing nodule in the right breast 10:00 8 cm from the nipple, to optimally document two-year stability, which can be performed at the time of next annual mammogram, due in 11/2021. RECOMMENDATION: Mammography and ultrasound in 1 year. BI-RADS 3. \par \par 3/8/2021 ([facility]) right breast US showing right breast demonstrating no interval change in a benign-appearing solid nodule at 10:00 8 cm and the nipple measuring 5 x 7 x 2 mm, stable since 11/2020 which meets criteria for additional follow-up. Probably benign BIRADS 3 \par \par 11/10/2021 ([facility]) bilateral mammogram/US showing breasts are heterogeneously dense, No suspicious mass, microcalcification or distortion.Sonographic survey was performed of the entirety of both breasts, including all quadrants and the retroareolar region, demonstrating no suspicious finding. There is a benign-appearing solid nodule in the right breast at 10:00 8 cm from the nipple measuring 7 x 3 x 4 mm, stable since 11/2020, demonstrating annual stability, which meets criteria for additional follow-up. There is heterogeneous background echotexture with predominantly dense underlying glandular tissue. IMPRESSION: Recommend annual sonographic follow-up of a benign-appearing solid nodule in the right breast to optimally document two-year stability, which can be performed at time of next annual mammogram, due in 11/2022. Probably benign BIRADS 3 Mammography and ultrasound in 1 year

## 2022-01-19 NOTE — PAST MEDICAL HISTORY
Postmenopausal] : The patient is postmenopausal [Menarche Age ____] : age at menarche was [unfilled] [Menopause Age____] : age at menopause was [unfilled] [Approximately ___] : the LMP was approximately [unfilled] [Total Preg ___] : G[unfilled] [Live Births ___] : P[unfilled]  [Living ___] : Living: [unfilled] [Age At Live Birth ___] : Age at live birth: [unfilled] [History of Hormone Replacement Treatment] : has no history of hormone replacement treatment [FreeTextEntry5] : Advanced laparoscopic robotic assisted hysterectomy, BSO 8/23/16 [FreeTextEntry6] : None [FreeTextEntry7] : None [FreeTextEntry8] : 9 months

## 2022-03-09 ENCOUNTER — APPOINTMENT (OUTPATIENT)
Dept: ORTHOPEDIC SURGERY | Facility: CLINIC | Age: 77
End: 2022-03-09
Payer: COMMERCIAL

## 2022-03-09 PROCEDURE — 99213 OFFICE O/P EST LOW 20 MIN: CPT

## 2022-03-09 RX ORDER — ATORVASTATIN CALCIUM 80 MG/1
TABLET, FILM COATED ORAL
Refills: 0 | Status: COMPLETED | COMMUNITY

## 2022-03-09 NOTE — ASSESSMENT
[FreeTextEntry1] : Ms. Corona is a 77 y/o right-hand-dominant woman with right elbow pain most consistent with lateral epicondylitis.  I explained the diagnosis and treatment.  This often can take a while to resolve but most of the time will get better.  She had banged the elbow and there could be some bruising as well.  She should avoid painful activities.  Heat and ice and stretch forearm.  Eccentric strengthening.  She could use diclofenac gel or take some Tylenol or ibuprofen if needed.  She should try to modify activities to not aggravate the elbow.  If it is getting worse or not getting better or any concern then she should come in for follow-up

## 2022-03-09 NOTE — PHYSICAL EXAM
[UE] : Sensory: Intact in bilateral upper extremities [Normal RUE] : Right Upper Extremity: No scars, rashes, lesions, ulcers, skin intact [Normal LUE] : Left Upper Extremity: No scars, rashes, lesions, ulcers, skin intact [Normal Touch] : sensation intact for touch [Normal] : No respiratory distress and lungs were clear to auscultation bilaterally [de-identified] : Right elbow\par No edema, ecchymoses, erythema.  Skin is intact.  Lateral epicondyle is somewhat prominent bilaterally but symmetric.\par Point tenderness on the lateral epicondyle.  Pain is reproduced with resisted middle finger extension.\par 5/5 strength throughout biceps, triceps,  and finger extension\par Sensation is intact.\par Elbow range of motion is 0 to 150 degrees flexion and 80 degrees pronation and supination with mild pain on maximum flexion.\par Pulling at the skin on the lateral side of the elbow can cause pain.\par Negative Tinel's at the cubital tunnel and no medial or other elbow pain except at the lateral epicondyle

## 2022-03-09 NOTE — HISTORY OF PRESENT ILLNESS
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 Pain started about five days ago and is burning, sharp and localized. the pain is more noticeable when she bends the arm or tries to put on her coat.\par Pain currently is about 3 out of 10.  She may have banged it last week but thinks it was already there.  She did not hit it that hard there was no swelling or bruising.  Bending the elbow deeply causes pain as does lifting certain objects.  No prior elbow issues.  No numbness or tingling.  Pain is localized.  Its burning and sharp better with rest

## 2022-03-10 ENCOUNTER — NON-APPOINTMENT (OUTPATIENT)
Age: 77
End: 2022-03-10

## 2022-03-10 ENCOUNTER — APPOINTMENT (OUTPATIENT)
Dept: HEART AND VASCULAR | Facility: CLINIC | Age: 77
End: 2022-03-10
Payer: COMMERCIAL

## 2022-03-10 VITALS
SYSTOLIC BLOOD PRESSURE: 124 MMHG | HEIGHT: 67.5 IN | OXYGEN SATURATION: 96 % | DIASTOLIC BLOOD PRESSURE: 74 MMHG | BODY MASS INDEX: 25.44 KG/M2 | HEART RATE: 78 BPM | TEMPERATURE: 97 F | WEIGHT: 164 LBS

## 2022-03-10 PROCEDURE — 99213 OFFICE O/P EST LOW 20 MIN: CPT

## 2022-03-10 PROCEDURE — 93000 ELECTROCARDIOGRAM COMPLETE: CPT
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
## 2022-03-10 NOTE — PHYSICAL EXAM
[General Appearance - Well Developed] : well developed [Normal Appearance] : normal appearance [Well Groomed] : well groomed [General Appearance - Well Nourished] : well nourished [No Deformities] : no deformities [General Appearance - In No Acute Distress] : no acute distress [Normal Conjunctiva] : the conjunctiva exhibited no abnormalities [Eyelids - No Xanthelasma] : the eyelids demonstrated no xanthelasmas [Exaggerated Use Of Accessory Muscles For Inspiration] : no accessory muscle use [Respiration, Rhythm And Depth] : normal respiratory rhythm and effort [Auscultation Breath Sounds / Voice Sounds] : lungs were clear to auscultation bilaterally [Heart Rate And Rhythm] : heart rate and rhythm were normal [Heart Sounds] : normal S1 and S2 [Murmurs] : no murmurs present [Abdomen Soft] : soft [Abdomen Tenderness] : non-tender [Abdomen Mass (___ Cm)] : no abdominal mass palpated [Abnormal Walk] : normal gait [Gait - Sufficient For Exercise Testing] : the gait was sufficient for exercise testing [Nail Clubbing] : no clubbing of the fingernails [Cyanosis, Localized] : no localized cyanosis [Petechial Hemorrhages (___cm)] : no petechial hemorrhages [Skin Color & Pigmentation] : normal skin color and pigmentation [] : no rash [No Venous Stasis] : no venous stasis [Skin Lesions] : no skin lesions [No Skin Ulcers] : no skin ulcer [No Xanthoma] : no  xanthoma was observed [Oriented To Time, Place, And Person] : oriented to person, place, and time [Affect] : the affect was normal [Mood] : the mood was normal [No Anxiety] : not feeling anxious [FreeTextEntry1] : No JVD or bruits

## 2022-03-10 NOTE — HISTORY OF PRESENT ILLNESS
[FreeTextEntry1] : EP- [facility]\par GYN- Dr Kuhn\par PCP- Dr Shrestha\par Ophtho- Dr Tomy Randhawa\par Neuro- Dr Craig\par Neck- Dr Hurley\par Ortho- Will Canela

## 2022-03-10 NOTE — ASSESSMENT
[FreeTextEntry1] : HTN-  BP high here, increased Cardizem ER to 60 BID.   Also high with Dr Shrestha.  BP slightly up. No changes, meds renewed.  BP better due to pt taking Diltiazem BID as directed, not once daily.   Taking it BID, will wait until next  visit in 4-6 mos will consider HCT.  BP excellent.\par \par SOB/CONTI- S/P stress echo, cardiomegaly reported on a recent CXR. NL St-echo 1/2021. \par \par Near syncope Sept 6 and then a fall Oct 16.  Both episodes ended with pt in the ER. To see Dr Dwyer for pacer interrogation. Pt had NL echoes in 2013, 14 and 16.\par \par Prediabetes-  Wt has been fluctuating.  Labs done in ER all excellent glucose 121.  A1c 5.8. Labs were drawn today in Office. \par \par HLD- Not on a statin, will reconsider.   then Lipitor started.  Labs were drawn today in Office. 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

## 2022-03-15 LAB
BASOPHILS # BLD AUTO: 0.04 K/UL
BASOPHILS NFR BLD AUTO: 0.8 %
EOSINOPHIL # BLD AUTO: 0.06 K/UL
EOSINOPHIL NFR BLD AUTO: 1.2 %
ESTIMATED AVERAGE GLUCOSE: 128 MG/DL
HBA1C MFR BLD HPLC: 6.1 %
HCT VFR BLD CALC: 39.6 %
HGB BLD-MCNC: 13.3 G/DL
IMM GRANULOCYTES NFR BLD AUTO: 0 %
LYMPHOCYTES # BLD AUTO: 1.46 K/UL
LYMPHOCYTES NFR BLD AUTO: 29.6 %
MAN DIFF?: NORMAL
MCHC RBC-ENTMCNC: 29.6 PG
MCHC RBC-ENTMCNC: 33.6 GM/DL
MCV RBC AUTO: 88.2 FL
MONOCYTES # BLD AUTO: 0.42 K/UL
MONOCYTES NFR BLD AUTO: 8.5 %
NEUTROPHILS # BLD AUTO: 2.95 K/UL
NEUTROPHILS NFR BLD AUTO: 59.9 %
PLATELET # BLD AUTO: 337 K/UL
RBC # BLD: 4.49 M/UL
RBC # FLD: 12.8 %
WBC # FLD AUTO: 4.93 K/UL

## 2022-04-14 ENCOUNTER — TRANSCRIPTION ENCOUNTER (OUTPATIENT)
Age: 77
End: 2022-04-14

## 2022-04-14 ENCOUNTER — APPOINTMENT (OUTPATIENT)
Dept: HEART AND VASCULAR | Facility: CLINIC | Age: 77
End: 2022-04-14

## 2022-04-20 ENCOUNTER — FORM ENCOUNTER (OUTPATIENT)
Age: 77
End: 2022-04-20

## 2022-05-19 ENCOUNTER — APPOINTMENT (OUTPATIENT)
Dept: HEART AND VASCULAR | Facility: CLINIC | Age: 77
End: 2022-05-19

## 2022-05-23 ENCOUNTER — FORM ENCOUNTER (OUTPATIENT)
Age: 77
End: 2022-05-23

## 2022-06-22 ENCOUNTER — APPOINTMENT (OUTPATIENT)
Dept: HEART AND VASCULAR | Facility: CLINIC | Age: 77
End: 2022-06-22

## 2022-07-28 ENCOUNTER — RX RENEWAL (OUTPATIENT)
Age: 77
End: 2022-07-28

## 2022-07-29 ENCOUNTER — APPOINTMENT (OUTPATIENT)
Dept: HEART AND VASCULAR | Facility: CLINIC | Age: 77
End: 2022-07-29

## 2022-08-01 ENCOUNTER — APPOINTMENT (OUTPATIENT)
Dept: HEART AND VASCULAR | Facility: CLINIC | Age: 77
End: 2022-08-01

## 2022-08-01 ENCOUNTER — NON-APPOINTMENT (OUTPATIENT)
Age: 77
End: 2022-08-01

## 2022-08-01 PROCEDURE — 93294 REM INTERROG EVL PM/LDLS PM: CPT

## 2022-08-01 PROCEDURE — 93296 REM INTERROG EVL PM/IDS: CPT

## 2022-08-04 ENCOUNTER — FORM ENCOUNTER (OUTPATIENT)
Age: 77
End: 2022-08-04

## 2022-08-15 ENCOUNTER — NON-APPOINTMENT (OUTPATIENT)
Age: 77
End: 2022-08-15

## 2022-09-14 ENCOUNTER — NON-APPOINTMENT (OUTPATIENT)
Age: 77
End: 2022-09-14

## 2022-09-15 ENCOUNTER — EMERGENCY (EMERGENCY)
Facility: HOSPITAL | Age: 77
LOS: 1 days | Discharge: HOME | End: 2022-09-15
Attending: STUDENT IN AN ORGANIZED HEALTH CARE EDUCATION/TRAINING PROGRAM
Payer: COMMERCIAL

## 2022-09-15 VITALS
DIASTOLIC BLOOD PRESSURE: 89 MMHG | OXYGEN SATURATION: 98 % | SYSTOLIC BLOOD PRESSURE: 153 MMHG | RESPIRATION RATE: 20 BRPM | HEART RATE: 81 BPM

## 2022-09-15 VITALS
SYSTOLIC BLOOD PRESSURE: 169 MMHG | HEART RATE: 68 BPM | HEIGHT: 68 IN | WEIGHT: 164.91 LBS | OXYGEN SATURATION: 96 % | TEMPERATURE: 98 F | DIASTOLIC BLOOD PRESSURE: 93 MMHG | RESPIRATION RATE: 20 BRPM

## 2022-09-15 DIAGNOSIS — Z96.641 PRESENCE OF RIGHT ARTIFICIAL HIP JOINT: Chronic | ICD-10-CM

## 2022-09-15 DIAGNOSIS — Z95.0 PRESENCE OF CARDIAC PACEMAKER: Chronic | ICD-10-CM

## 2022-09-15 PROCEDURE — 70450 CT HEAD/BRAIN W/O DYE: CPT | Mod: 26,MA

## 2022-09-15 PROCEDURE — 73110 X-RAY EXAM OF WRIST: CPT | Mod: 26,RT

## 2022-09-15 PROCEDURE — 70450 CT HEAD/BRAIN W/O DYE: CPT | Mod: MA

## 2022-09-15 PROCEDURE — 99285 EMERGENCY DEPT VISIT HI MDM: CPT | Mod: 25

## 2022-09-15 PROCEDURE — 99284 EMERGENCY DEPT VISIT MOD MDM: CPT | Mod: 25

## 2022-09-15 PROCEDURE — 29125 APPL SHORT ARM SPLINT STATIC: CPT

## 2022-09-15 PROCEDURE — 73130 X-RAY EXAM OF HAND: CPT | Mod: 26,RT

## 2022-09-15 PROCEDURE — 73562 X-RAY EXAM OF KNEE 3: CPT

## 2022-09-15 PROCEDURE — 73110 X-RAY EXAM OF WRIST: CPT

## 2022-09-15 PROCEDURE — 29130 APPL FINGER SPLINT STATIC: CPT | Mod: RT

## 2022-09-15 PROCEDURE — 72125 CT NECK SPINE W/O DYE: CPT | Mod: MA

## 2022-09-15 PROCEDURE — 72125 CT NECK SPINE W/O DYE: CPT | Mod: 26,MA

## 2022-09-15 PROCEDURE — 73130 X-RAY EXAM OF HAND: CPT

## 2022-09-15 PROCEDURE — 73562 X-RAY EXAM OF KNEE 3: CPT | Mod: 26,LT

## 2022-09-15 RX ORDER — ACETAMINOPHEN 500 MG
650 TABLET ORAL ONCE
Refills: 0 | Status: COMPLETED | OUTPATIENT
Start: 2022-09-15 | End: 2022-09-15

## 2022-09-15 RX ORDER — IBUPROFEN 200 MG
600 TABLET ORAL ONCE
Refills: 0 | Status: COMPLETED | OUTPATIENT
Start: 2022-09-15 | End: 2022-09-15

## 2022-09-15 RX ADMIN — Medication 600 MILLIGRAM(S): at 14:33

## 2022-09-15 RX ADMIN — Medication 650 MILLIGRAM(S): at 13:43

## 2022-09-15 RX ADMIN — Medication 650 MILLIGRAM(S): at 14:33

## 2022-09-15 RX ADMIN — Medication 600 MILLIGRAM(S): at 13:43

## 2022-09-15 NOTE — ED PROVIDER NOTE - CARE PROVIDER_API CALL
Titus Restrepo)  Plastic Surgery; Surgery; Surgical Critical Care  [address] [place] [address]  Phone: [phone]  Fax: [phone]  Follow Up Time:

## 2022-09-15 NOTE — ED PROVIDER NOTE - NSFOLLOWUPINSTRUCTIONS_ED_ALL_ED_FT
You were evaluated in the emergency department after a trip and fall. You had a CT of your head which did not show any acute injuries or bleeding. Your CT of your Cervical spine (neck) shows arthritis but no acute fractures.    Your x-rays did not show acute fractures but you have pain at your hand at your scaphoid bone. For this, you were placed in a splint. You need to have a repeat x-ray in 2-3 weeks to make sure there isn't a fracture. Please rest the hand, ice the area, and elevate the extremity.    Please follow up with an orthopedic hand surgeon. You were evaluated in the emergency department after a trip and fall. You had a CT of your head which did not show any acute injuries or bleeding. Your CT of your Cervical spine (neck) shows arthritis but no acute fractures.    Your x-rays did not show acute fractures but you have pain at your hand at your scaphoid bone. For this, you were placed in a splint. You need to have a repeat x-ray in 2-3 weeks to make sure there isn't a fracture. Please rest the hand, ice the area, and elevate the extremity.    Please follow up with an orthopedic hand surgeon and your primary care physician within 2-3 days.     You may take Acetaminophen over the counter as needed for pain and/or fever. Use as directed and see medication warnings.  You may take Ibuprofen over the counter as needed for pain and/or fever. Use as directed and see medication warnings.  Please follow up with your primary care physician.  Please bring a copy of your results with you.  Please return to the emergency department for worsening of your symptoms.

## 2022-09-15 NOTE — ED PROCEDURE NOTE - NS ED ATTENDING STATEMENT MOD
This was a shared visit with the JOAQUÍN. I reviewed and verified the documentation and independently performed the documented:

## 2022-09-15 NOTE — ED PROVIDER NOTE - NSPTACCESSSVCSAPPTDETAILS_ED_ALL_ED_FT
urgent; ortho hand; right hand/wrist injury with scaphoid bone pain. needs repeat x-ray in 2-3 weeks.

## 2022-09-15 NOTE — ED ADULT NURSE NOTE - OBJECTIVE STATEMENT
1135 75 y/o f to er via ns ems stretcher w/c-collar in place s/p trip and fall on a sidewalk landing on her r side. went to  and sent here for further eval. pt has pain to head,no loc,r arm and l knee. able to move all extremities. has abrasions to r side of head.r hand and l knee, also lac to l finger. no abd pain,vomiting,sob or cp,rib pain no back pain.does not pain meds at this time. 1135 77 y/o f to er via ns ems stretcher w/c-collar in place s/p trip and fall on a sidewalk landing on her r side. went to  and sent here for further eval. pt has pain to head,no loc,r arm and l knee. able to move all extremities. has abrasions to r side of head.r hand and l knee, also lac to l finger. no abd pain,vomiting,sob or cp,rib pain no back pain.does not pain meds at this time. 1230 to xr. 1330 ret from xr req pain meds.

## 2022-09-15 NOTE — ED PROVIDER NOTE - NS ED ROS FT
Constitutional: no fevers; no chills  HEENT: no visual changes, no sore throat, no rhinorrhea  CV: no cp; no palpitations  Resp: no sob; no cough  GI: no abd pain, no nausea, no vomiting, no diarrhea, no constipation  : no dysuria, no hematuria  MSK: head pain, knee pain, hand/wrist pain  skin: no rashes  neuro: no HA, no numbness; no weakness, no tingling  endo: no polyuria, no polydipsia

## 2022-09-15 NOTE — ED PROVIDER NOTE - PATIENT PORTAL LINK FT
You can access the FollowMyHealth Patient Portal offered by [facility] by registering at the following website: [email or web address]. By joining [employer]’s FollowMyHealth portal, you will also be able to view your health information using other applications (apps) compatible with our system.

## 2022-09-15 NOTE — ED PROVIDER NOTE - CLINICAL SUMMARY MEDICAL DECISION MAKING FREE TEXT BOX
Joe Wyatt MD. pt presents s/p mechanical fall, +head strike, -LOC. c/o l knee pain, right upper hand/wrist pain. ambulatory. exam as above. will obtain CTH/c-spine, x-rays, analgesia, reassess

## 2022-09-15 NOTE — ED PROVIDER NOTE - PHYSICAL EXAMINATION
PHYSICAL EXAM:  GENERAL: non-toxic appearing; in no respiratory distress  HEAD: very superficial stellate laceration on the right forehead, small; no active bleeding; mild tenderness with mild ecchymosis over that site; no other bony facial tenderness, step-offs, or deformities;   NECK: No JVD; trachea midline  EYES: PERRL, EOMs intact b/l w/out deficits; normal conjunctiva  CHEST/LUNG: CTAB no wheezes/rhonchi/rales  HEART: RRR no murmur/gallops/rubs  ABDOMEN: soft, NT, ND  EXTREMITIES: No LE edema, +2 radial pulses b/l, +2 DP/PT pulses b/l  MUSCULOSKELETAL: FROM of all 4 extremities; no midline cervcail tenderness; the left knee pain has FROM, mild medial knee tenderness with superficial abrasion; the right upper extremity has mild tenderness to the base of the first MCP but no snuffbox tendrenss. FROM of the right wrist; no forearm tendreness; pt ambulatory.   NERVOUS SYSTEM:  A&Ox3, No motor deficits or sensory deficits; CNII-XII intact; Speech is fluent and appropriate  SKIN:  Warm and dry as visualized; PHYSICAL EXAM:  GENERAL: non-toxic appearing; in no respiratory distress  HEAD: very superficial stellate laceration on the right forehead, small; no active bleeding; mild tenderness with mild ecchymosis over that site; no other bony facial tenderness, step-offs, or deformities;   NECK: No JVD; trachea midline  EYES: PERRL, EOMs intact b/l w/out deficits; normal conjunctiva  CHEST/LUNG: CTAB no wheezes/rhonchi/rales  HEART: RRR no murmur/gallops/rubs  ABDOMEN: soft, NT, ND  EXTREMITIES: No LE edema, +2 radial pulses b/l, +2 DP/PT pulses b/l  MUSCULOSKELETAL: FROM of all 4 extremities; no midline cervcail tenderness; the left knee pain has FROM, mild medial knee tenderness with superficial abrasion; the right upper extremity has mild tenderness to the base of the first MCP and +snuffbox tendrenss. FROM of the right wrist; no forearm tendreness; pt ambulatory.   NERVOUS SYSTEM:  A&Ox3, No motor deficits or sensory deficits; CNII-XII intact; Speech is fluent and appropriate  SKIN:  Warm and dry as visualized;

## 2022-09-15 NOTE — ED ADULT NURSE NOTE - NSICDXPASTMEDICALHX_GEN_ALL_CORE_FT
PAST MEDICAL HISTORY:  Asthma Asthma    Coronary artery disease without angina pectoris, unspecified vessel or lesion type, unspecified whether native or transplanted heart     Cyst of right ovary elevated CEA    Essential hypertension Hypertension    Pacemaker for supraventricular beats

## 2022-09-15 NOTE — ED PROVIDER NOTE - OBJECTIVE STATEMENT
77 yo F pmhx CAD, s/p PPM, not on AC or ASA, HTN, presents to the ED c/o right forehead pain today s/p mechanical fall today. She misstepped on a sidewalk, causing her to fall forehead, hitting her head. She also fell onto her knees and hands. She is c/o L knee pain and R hand pain. She denies LOC, n/v, cp, sob, abd pain, fevers, cough, dizziness, palpitations.

## 2022-09-15 NOTE — ED ADULT NURSE NOTE - NS ED PATIENT SAFETY CONCERN
Chief Complaint  Blocked Artery (Blocked artery in right leg)    Subjective        Familia Thompson presents to [facility]  History of Present Illness  Patient presents today for hospitalization follow-up for a artery occlusion in the right leg.  He was admitted on 5/12/2022 at [facility].  He just recently had follow-up with vascular on 6/17/2022.  He had a right profundofemoral artery thromboendarterectomy with patch angioplasty and redo right femoral to posterior tibial artery bypass with PTFE graft on 5/16/2022.  He reports that his right leg is feeling better now.  He will have follow-up again with vascular at office of Dr. Burger in 3 months time.  He is currently taking Eliquis, Plavix and aspirin in addition to taking a statin as he is on atorvastatin 80 mg.  He does still see Dr. Dixon for atrial fibrillation has a follow-up in the next 1 to 2 weeks.  His heart rate is notably low on intake.  Repeat done by myself was 50 bpm.  He denies any symptoms of having bradycardia.  His heart rate appears regular other than bradycardia.  I reviewed his medications.  He is currently taking metoprolol 25 mg twice daily in addition to taking amiodarone 200 mg twice daily.  He denies any symptoms of feeling tired or sluggish.  I reviewed his labs from my last visit with him.  He was noted to have iron deficiency anemia.  Plan to have his labs repeated when he returns for follow-up.  He was noted to have some black stools but these have now resolved.  I suspect that this may have been due to to the iron supplementation he was taking.  He had a chest CT on 04/27/2022 showing an apparent wall thickening of the gastric body which may be due to incomplete distention inflammation less likely malignancy and he was to be considered for further evaluation with endoscopic.  He does have an appointment to see GI tomorrow with ISIDRO Akins.  I encouraged him to keep this appointment.  Patient  "does have seborrheic dermatitis which comes and goes.  He is more symptomatic just superior to the right ear.  Objective   Vital Signs:  /68   Pulse (!) 45   Temp 97.7 °F (36.5 °C)   Ht 177.8 cm (70\")   Wt 65.7 kg (144 lb 12.8 oz)   SpO2 99%   BMI 20.78 kg/m²   Estimated body mass index is 20.78 kg/m² as calculated from the following:    Height as of this encounter: 177.8 cm (70\").    Weight as of this encounter: 65.7 kg (144 lb 12.8 oz).    BMI is within normal parameters. No other follow-up for BMI required.      Physical Exam   General: AAO ×3, no acute distress, pleasant  HEENT: Normocephalic, atraumatic  Skin: Seborrheic dermatitis noted just above the right ear.  Incision in the right lower extremity below the knee is intact with no evidence of erythema or cellulitis.  He also has an incision in the right groin area that is intact with no evidence of erythema or cellulitis.  Cardiovascular: Regular rhythm but bradycardia noted.  No appreciable murmur  Respiratory: Clear to auscultation bilaterally no RRW  Gastrointestinal: Soft nontender nondistended with bowel sounds present  extremities: No edema  Neurologic: CN II through XII grossly intact   Psychiatric: Normal mood and affect  Result Review :                Assessment and Plan   Diagnoses and all orders for this visit:    1. Atrial fibrillation, unspecified type (HCC) (Primary)    2. Coronary artery disease involving coronary bypass graft of native heart with other forms of angina pectoris (HCC)    3. Primary hypertension  -     CBC & Differential; Future  -     Comprehensive Metabolic Panel; Future    4. Peripheral arterial disease (HCC)    5. Abnormal chest CT    6. Iron deficiency anemia, unspecified iron deficiency anemia type  -     Iron Profile; Future    7. Medication monitoring encounter  -     CBC & Differential; Future  -     Comprehensive Metabolic Panel; Future  -     Iron Profile; Future    8. Seborrheic dermatitis    Other " orders  -     Discontinue: metoprolol tartrate (LOPRESSOR) 25 MG tablet; Take 1 tablet by mouth 2 (Two) Times a Day. Take 1/2 tablet PO BID  Dispense: 180 tablet; Refill: 1  -     ketoconazole (NIZORAL) 2 % shampoo; Apply  topically to the appropriate area as directed 2 (Two) Times a Week.  Dispense: 120 mL; Refill: 1  -     metoprolol tartrate (LOPRESSOR) 25 MG tablet; Take 1/2 tablet PO BID  Dispense: 180 tablet; Refill: 1    I discussed with patient a reduction of metoprolol to take half a tablet of 25 mg twice daily.  I encouraged him to keep his appointment with GI tomorrow.  Blood pressure has been adequately controlled.  No medication change at this time otherwise.  I encouraged him to continue current treatment for iron deficiency anemia.         Follow Up   Return in about 2 months (around 8/21/2022) for atrial fibrillation.  Patient was given instructions and counseling regarding his condition or for health maintenance advice. Please see specific information pulled into the AVS if appropriate.        No

## 2022-09-15 NOTE — ED ADULT NURSE NOTE - NSICDXPASTSURGICALHX_GEN_ALL_CORE_FT
PAST SURGICAL HISTORY:  Artificial pacemaker July 2005    History of right hip replacement right total hip replacement  2014    Open fracture of neck of femur Hip fracture requiring operative repair, right, open type I or II, initial encounter  2003    Presence of cardiac pacemaker Pacemaker

## 2022-09-16 ENCOUNTER — APPOINTMENT (OUTPATIENT)
Dept: ORTHOPEDIC SURGERY | Facility: CLINIC | Age: 77
End: 2022-09-16

## 2022-09-16 DIAGNOSIS — M79.642 PAIN IN LEFT HAND: ICD-10-CM

## 2022-09-16 PROCEDURE — 73110 X-RAY EXAM OF WRIST: CPT | Mod: RT

## 2022-09-16 PROCEDURE — 99214 OFFICE O/P EST MOD 30 MIN: CPT

## 2022-09-19 NOTE — ASSESSMENT
[FreeTextEntry1] : 76 year-old female with R wrist pain maximally at the anatomic snuffbox concerning for scaphoid fracture with negative Xrays 1 day status post fall\par \par Continue thumb spica splint\par CT scan to evaluate R scaphoid (cannot tolerate MRI due to incompatible pacer leads)\par Follow-up after CT scan to discuss results

## 2022-09-19 NOTE — HISTORY OF PRESENT ILLNESS
[FreeTextEntry1] : 76-year-old right-hand-dominant female who fell on her right hand on 9/15/2022 and also sustained a head laceration at that time who presents the office with ongoing right radial wrist pain.  She reports that she initially went to [facility] where x-rays were performed which were negative for fracture she was placed into a thumb spica splint and instructed to follow-up.  She denies any numbness or paresthesias.  She endorsed immediate swelling and ecchymosis.  Thumb range of motion is limited secondary to pain.  She is here today for interval evaluation.\par \par Of note, the patient has a prior right distal radius fracture that was treated nonoperatively.

## 2022-09-19 NOTE — PHYSICAL EXAM
[de-identified] : General: NAD\par RSP: Nonlabored\par MSK:\par RUE seen and evaluated\par Tender to palpation at anatomic snuffbox and at scaphoid tubercle\par Flex and extend thumb IP joint\par No other sites of tenderness, remaining hand is able to flex and extend with 0.5cm tip to palm secondary to pain\par Wrist swollen and ROM tender though fluid\par 2+ radial pulse\par SILT throughout [de-identified] : XR R wrist: No evidence of scaphoid fracture.  No evidence of fracture nor dislocation about the wrist.  R distal radius malunion from prior injury noted with mild degenerative change and subchondral sclerosis.

## 2022-09-22 ENCOUNTER — RESULT REVIEW (OUTPATIENT)
Age: 77
End: 2022-09-22

## 2022-09-22 ENCOUNTER — APPOINTMENT (OUTPATIENT)
Dept: CT IMAGING | Facility: CLINIC | Age: 77
End: 2022-09-22

## 2022-09-22 ENCOUNTER — APPOINTMENT (OUTPATIENT)
Dept: RADIOLOGY | Facility: CLINIC | Age: 77
End: 2022-09-22

## 2022-09-22 ENCOUNTER — OUTPATIENT (OUTPATIENT)
Dept: OUTPATIENT SERVICES | Facility: HOSPITAL | Age: 77
LOS: 1 days | End: 2022-09-22
Payer: COMMERCIAL

## 2022-09-22 DIAGNOSIS — Z95.0 PRESENCE OF CARDIAC PACEMAKER: Chronic | ICD-10-CM

## 2022-09-22 DIAGNOSIS — Z96.641 PRESENCE OF RIGHT ARTIFICIAL HIP JOINT: Chronic | ICD-10-CM

## 2022-09-22 DIAGNOSIS — M79.642 PAIN IN LEFT HAND: ICD-10-CM

## 2022-09-22 DIAGNOSIS — M25.531 PAIN IN RIGHT WRIST: ICD-10-CM

## 2022-09-22 PROCEDURE — 73200 CT UPPER EXTREMITY W/O DYE: CPT

## 2022-09-22 PROCEDURE — 73200 CT UPPER EXTREMITY W/O DYE: CPT | Mod: 26,RT

## 2022-09-22 PROCEDURE — 76376 3D RENDER W/INTRP POSTPROCES: CPT

## 2022-09-22 PROCEDURE — 76376 3D RENDER W/INTRP POSTPROCES: CPT | Mod: 26

## 2022-09-23 ENCOUNTER — APPOINTMENT (OUTPATIENT)
Dept: ORTHOPEDIC SURGERY | Facility: CLINIC | Age: 77
End: 2022-09-23

## 2022-09-23 PROCEDURE — 99213 OFFICE O/P EST LOW 20 MIN: CPT | Mod: 25

## 2022-09-23 PROCEDURE — 29075 APPL CST ELBW FNGR SHORT ARM: CPT | Mod: RT

## 2022-09-23 NOTE — ASSESSMENT
[FreeTextEntry1] : 76 year-old female with 1 week history of a scaphoid fracture that has been immobilized in a radial gutter splint.  Today she was converted to a short arm cast for further immobilization of her scaphoid fracture.\par \par Plan:\par Short arm cast to immobilize dorsal scaphoid fracture\par OK to return to work with no heavy lifting\par F/u in 2 weeks for repeat imaging and evaluation\par Plan for repeat CT scan in 8 weeks to assess for fracture healing

## 2022-09-23 NOTE — PHYSICAL EXAM
[de-identified] : RUE:\par Tender dorsally over scaphoid as well as at anatomic snuffbox and scaphoid tubercle\par Mild swelling about wrist\par SILT throughout\par Able to flex and extend all digits with 0.5cm tip to palm distance\par 2+ radial pulse [de-identified] : CT R Wrist:  Dorsally-based vertical scaphoid fracture at proximal scaphoid. Fracture is minimally displaced.

## 2022-09-23 NOTE — HISTORY OF PRESENT ILLNESS
[FreeTextEntry1] : 76 year-old female returns after CT scan of her wrist confirmed a dorsally-based scaphoid fracture.  She reports that her pain has improved in her splint.  She denies numbness and tingling.  Denies new injury or worsening symptoms.  Here today for CT f/u and next steps in care.

## 2022-10-03 ENCOUNTER — APPOINTMENT (OUTPATIENT)
Dept: HEART AND VASCULAR | Facility: CLINIC | Age: 77
End: 2022-10-03

## 2022-10-07 ENCOUNTER — APPOINTMENT (OUTPATIENT)
Dept: ORTHOPEDIC SURGERY | Facility: CLINIC | Age: 77
End: 2022-10-07

## 2022-10-07 PROCEDURE — 29075 APPL CST ELBW FNGR SHORT ARM: CPT

## 2022-10-07 PROCEDURE — 99024 POSTOP FOLLOW-UP VISIT: CPT

## 2022-10-10 ENCOUNTER — RX RENEWAL (OUTPATIENT)
Age: 77
End: 2022-10-10

## 2022-10-14 NOTE — ASSESSMENT
[FreeTextEntry1] : 76 year-old female with a right scaphoid fracture that is undergoing immobilization in a short arm cats\par \par Plan:\par Short arm cast to immobilize dorsal scaphoid fracture\par OK to return to work with no heavy lifting\par F/u in 3 weeks for repeat imaging and evaluation

## 2022-10-14 NOTE — PROCEDURE
[FreeTextEntry1] : Patient apprised to risks and benefits of cast application and offered verbal consent\par Well-padded short arm cast applied and molded\par No complications

## 2022-10-14 NOTE — HISTORY OF PRESENT ILLNESS
[FreeTextEntry1] : 76 year-old female returns for evaluation of her wrist and cast change.  Has been doing well.  Reports decrease in swelling and pain from prior.

## 2022-10-14 NOTE — PHYSICAL EXAM
[de-identified] : RUE:\par Tender dorsally over scaphoid as well as at anatomic snuffbox and scaphoid tubercle\par Resolved swelling\par SILT throughout\par Able to flex and extend all digits with 0.5cm tip to palm distance\par 2+ radial pulse

## 2022-10-28 ENCOUNTER — APPOINTMENT (OUTPATIENT)
Dept: ORTHOPEDIC SURGERY | Facility: CLINIC | Age: 77
End: 2022-10-28

## 2022-10-28 PROCEDURE — 99212 OFFICE O/P EST SF 10 MIN: CPT

## 2022-10-28 PROCEDURE — 73110 X-RAY EXAM OF WRIST: CPT | Mod: RT

## 2022-11-01 ENCOUNTER — APPOINTMENT (OUTPATIENT)
Dept: ULTRASOUND IMAGING | Facility: CLINIC | Age: 77
End: 2022-11-01

## 2022-11-01 ENCOUNTER — RESULT REVIEW (OUTPATIENT)
Age: 77
End: 2022-11-01

## 2022-11-01 ENCOUNTER — APPOINTMENT (OUTPATIENT)
Dept: MAMMOGRAPHY | Facility: CLINIC | Age: 77
End: 2022-11-01

## 2022-11-01 ENCOUNTER — OUTPATIENT (OUTPATIENT)
Dept: OUTPATIENT SERVICES | Facility: HOSPITAL | Age: 77
LOS: 1 days | End: 2022-11-01

## 2022-11-01 DIAGNOSIS — Z96.641 PRESENCE OF RIGHT ARTIFICIAL HIP JOINT: Chronic | ICD-10-CM

## 2022-11-01 DIAGNOSIS — Z95.0 PRESENCE OF CARDIAC PACEMAKER: Chronic | ICD-10-CM

## 2022-11-01 PROCEDURE — 76641 ULTRASOUND BREAST COMPLETE: CPT | Mod: 26,50

## 2022-11-01 PROCEDURE — 77063 BREAST TOMOSYNTHESIS BI: CPT | Mod: 26

## 2022-11-01 PROCEDURE — 77067 SCR MAMMO BI INCL CAD: CPT | Mod: 26

## 2022-11-02 ENCOUNTER — NON-APPOINTMENT (OUTPATIENT)
Age: 77
End: 2022-11-02

## 2022-11-02 ENCOUNTER — APPOINTMENT (OUTPATIENT)
Dept: HEART AND VASCULAR | Facility: CLINIC | Age: 77
End: 2022-11-02

## 2022-11-02 PROCEDURE — 93296 REM INTERROG EVL PM/IDS: CPT

## 2022-11-02 PROCEDURE — 93294 REM INTERROG EVL PM/LDLS PM: CPT

## 2022-11-08 NOTE — ASSESSMENT
[FreeTextEntry1] : 76 year-old female with a scaphoid fracture that has been immobilized in a cast for 6 weeks which was discontinued today\par \par Plan:\par Removable wrist brace\par OK to begin physical therapy with stretching, ROM and then progressive strengthening\par F/u in 2 weeks for interval assessment

## 2022-11-08 NOTE — HISTORY OF PRESENT ILLNESS
[FreeTextEntry1] : 76 year-old female returns for evaluation of her wrist after dorsal avulsion scaphoid fracture.  No issues in cast.  Reports that her pain has continued to improve.  Has been using her fingers in her cast and doing well.  Denies new numbness and tingling.

## 2022-11-08 NOTE — PHYSICAL EXAM
[de-identified] : RUE:\par Minimal tendernes over the scaphoid and anatomic snuffbox and scaphoid tubercle\par Resolved swelling\par SILT throughout\par Able to flex and extend all digits- makes composite fist\par 2+ radial pulse [de-identified] : XR R Wrist:  Xray in cast without evidence of obvious scaphoid fracture visualized

## 2022-11-09 ENCOUNTER — RX RENEWAL (OUTPATIENT)
Age: 77
End: 2022-11-09

## 2022-12-05 ENCOUNTER — NON-APPOINTMENT (OUTPATIENT)
Age: 77
End: 2022-12-05

## 2022-12-14 ENCOUNTER — APPOINTMENT (OUTPATIENT)
Dept: BREAST CENTER | Facility: CLINIC | Age: 77
End: 2022-12-14
Payer: COMMERCIAL

## 2022-12-16 ENCOUNTER — APPOINTMENT (OUTPATIENT)
Dept: ORTHOPEDIC SURGERY | Facility: CLINIC | Age: 77
End: 2022-12-16

## 2022-12-16 PROCEDURE — 99212 OFFICE O/P EST SF 10 MIN: CPT

## 2022-12-16 NOTE — ASSESSMENT
[FreeTextEntry1] : 76 year-old female with a scaphoid fracture that has been immobilized in a cast for 6 weeks and has been continuing with OT\par \par Plan:\par Wean from brace completely\par OK to progress to strengthening\par Follow-up as needed

## 2022-12-16 NOTE — PHYSICAL EXAM
[de-identified] : RUE:\par Minimal tendernes at the dorsal wrist (radially and ulnarly)\par Resolved swelling\par SILT throughout\par Able to flex and extend all digits- makes composite fist\par 40 wrist flex/ex\par 2+ radial pulse

## 2022-12-16 NOTE — HISTORY OF PRESENT ILLNESS
[FreeTextEntry1] : 76 year-old female returns for evaluation of her wrist after dorsal avulsion scaphoid fracture.  She has mild pain with ROM but is no longer maximally tender at fracture site.

## 2022-12-20 ENCOUNTER — APPOINTMENT (OUTPATIENT)
Dept: HEART AND VASCULAR | Facility: CLINIC | Age: 77
End: 2022-12-20

## 2022-12-20 VITALS
HEART RATE: 86 BPM | BODY MASS INDEX: 25.31 KG/M2 | DIASTOLIC BLOOD PRESSURE: 60 MMHG | SYSTOLIC BLOOD PRESSURE: 121 MMHG | WEIGHT: 164 LBS

## 2022-12-20 PROCEDURE — 93280 PM DEVICE PROGR EVAL DUAL: CPT

## 2022-12-20 NOTE — PROCEDURE
[No] : not [NSR] : normal sinus rhythm [Pacemaker] : pacemaker [VVI] : VVI [Lead Imp:  ___ohms] : lead impedance was [unfilled] ohms [Sensing Amplitude ___mv] : sensing amplitude was [unfilled] mv [___V @] : [unfilled] V [___ ms] : [unfilled] ms [None] : none [de-identified] : [employer] [de-identified] : [employer] MRI [de-identified] : [id number] [de-identified] : 11/27/2019 [de-identified] : [id number] [de-identified] : 15 years [de-identified] : AP <1\par  <1\par No events \par Minute ventilation sensor disabled by signal artifact monitor.

## 2022-12-20 NOTE — ADDENDUM
[FreeTextEntry1] : I, Linda Tolbert, am scribing for and the presence of Dr. Dwyer the following sections: HPI, PMH,Family/social history, ROS, Physical Exam, Assessment / Plan.\par \par I, Mayo Dwyer, personally performed the services described in the documentation, reviewed the documentation recorded by the scribe in my presence and it accurately and completely records my words and actions.\par

## 2022-12-20 NOTE — HISTORY OF PRESENT ILLNESS
[FreeTextEntry1] : 78 y/o F HTN, HLD, syncope, sinus node dysfunction s/p PPM placement 2005 ([facility]- Dr. Mondragon), now s/p generator change 11/27/2019.  Noise noted on RA and RV lead, reproducible with AROM in the office.  Sensitivity decreased on RA and RV leads.  Prior remote monitoring alert for atrial lead impedance > 3000 ohms.  She is programmed DDI 40 to evaluate for pacing requirements.\par \par Brief skipped beats, no sustained palpitations. \par \par H/O syncope following PPM placement after initiation of Norvasc (2005).

## 2022-12-23 ENCOUNTER — APPOINTMENT (OUTPATIENT)
Dept: HEART AND VASCULAR | Facility: CLINIC | Age: 77
End: 2022-12-23

## 2023-01-18 ENCOUNTER — APPOINTMENT (OUTPATIENT)
Dept: BREAST CENTER | Facility: CLINIC | Age: 78
End: 2023-01-18
Payer: COMMERCIAL

## 2023-01-18 VITALS
HEART RATE: 72 BPM | WEIGHT: 164 LBS | SYSTOLIC BLOOD PRESSURE: 129 MMHG | DIASTOLIC BLOOD PRESSURE: 81 MMHG | OXYGEN SATURATION: 96 % | BODY MASS INDEX: 25.44 KG/M2 | HEIGHT: 67.5 IN

## 2023-01-18 DIAGNOSIS — Z80.3 FAMILY HISTORY OF MALIGNANT NEOPLASM OF BREAST: ICD-10-CM

## 2023-01-18 PROCEDURE — 99213 OFFICE O/P EST LOW 20 MIN: CPT

## 2023-01-18 NOTE — PAST MEDICAL HISTORY
[History of Hormone Replacement Treatment] : has no history of hormone replacement treatment [FreeTextEntry5] : Advanced laparoscopic robotic assisted hysterectomy, BSO 8/23/16 [FreeTextEntry6] : None [FreeTextEntry7] : None [FreeTextEntry8] : 9 months

## 2023-01-18 NOTE — HISTORY OF PRESENT ILLNESS
[FreeTextEntry1] : 77 year F who presents for annual follow up for breast cancer screening with a hx of a right breast nodule 10:00 8cmFN, small at 4 x 2 x 7 mm, likely an incidental fibroadenoma found on screening ultrasound 11/10/2020. B/L sMMG/US 11/1/22 BIRADS-2 revealing stability of the aforementioned right breast nodule. \par \par Denies any palpable abnormalities, no skin changes, no nipple discharge bilaterally. 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 Patiño for an ovarian cyst and underwent a laparoscopic robotic assisted hysterectomy, BSO in 8/23/16. \par \par Shilpi has a cardiac pacemaker, she is unable to undergo MRI's. \par \par ADRIAN 10.2%\par \par

## 2023-01-18 NOTE — DATA REVIEWED
[FreeTextEntry1] : 11/10/2020 bilateral mammogram/ultrasound: showing breasts are heterogeneously dense. There is an extremely small benign-appearing solid nodule in the right breast 10:00 8 cm from the nipple measuring 4 x 2 x 7 mm, likely an incidental fibroadenoma, not previously documented on prior ultrasounds dating back to 7/2014, which therefore meets criteria for follow-up. IMPRESSION: No mammographic/sonographic evidence of malignancy. Recommend annual follow-up of an extremely benign-appearing nodule in the right breast 10:00 8 cm from the nipple, to optimally document two-year stability, which can be performed at the time of next annual mammogram, due in 11/2021. RECOMMENDATION: Mammography and ultrasound in 1 year. BI-RADS 3. \par \par 3/8/2021 ([facility]) right breast US showing right breast demonstrating no interval change in a benign-appearing solid nodule at 10:00 8 cm and the nipple measuring 5 x 7 x 2 mm, stable since 11/2020 which meets criteria for additional follow-up. Probably benign BIRADS 3 \par \par 11/10/2021 ([facility]) bilateral mammogram/US showing breasts are heterogeneously dense, No suspicious mass, microcalcification or distortion.Sonographic survey was performed of the entirety of both breasts, including all quadrants and the retroareolar region, demonstrating no suspicious finding. There is a benign-appearing solid nodule in the right breast at 10:00 8 cm from the nipple measuring 7 x 3 x 4 mm, stable since 11/2020, demonstrating annual stability, which meets criteria for additional follow-up. There is heterogeneous background echotexture with predominantly dense underlying glandular tissue. IMPRESSION: Recommend annual sonographic follow-up of a benign-appearing solid nodule in the right breast to optimally document two-year stability, which can be performed at time of next annual mammogram, due in 11/2022. Probably benign BIRADS 3 Mammography and ultrasound in 1 year \par \par 11/1/22 ([facility]) B/L sMMG/US: heterogeneously dense. Benign-appearing solid nodule at 10:00 8 cm from the nipple measuring 7 mm, stable since 11/2020, demonstrating long-term stability, indicative of benign etiology. No mammographic/sonographic evidence of malignancy. RECOMMENDATION:  Mammography and US in 1 year. BI-RADS 2 - Benign

## 2023-01-18 NOTE — ASSESSMENT
[FreeTextEntry1] : 76 yo F presents for follow up of breast nodule on imaging, stable since 2020. 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 Patient has no complaints, physical exam WNL. \par \par Discussed with the patient the implications for her lifetime risk, given strong family history of breast cancer. Reviewed lifestyle prevention options including to limit alcohol intake, regular exercise and follow a low-fat diet. Will refer patient to nutritionist. \par \par All patient questions were answered; she verbalized understanding of the information and plan of care.\par Patient will return for MMG/US and reexamination in November 2023. Patient verbalized understanding and agreement of plan.\par \par \par

## 2023-02-01 ENCOUNTER — APPOINTMENT (OUTPATIENT)
Dept: HEART AND VASCULAR | Facility: CLINIC | Age: 78
End: 2023-02-01
Payer: COMMERCIAL

## 2023-02-01 ENCOUNTER — NON-APPOINTMENT (OUTPATIENT)
Age: 78
End: 2023-02-01

## 2023-02-01 PROCEDURE — 93296 REM INTERROG EVL PM/IDS: CPT

## 2023-02-01 PROCEDURE — 93294 REM INTERROG EVL PM/LDLS PM: CPT

## 2023-02-27 NOTE — ED ADULT TRIAGE NOTE - HEIGHT IN CM
[de-identified] : Today I had a lengthy discussion with the patient and her spouse regarding their bilateral ankle pain. I have addressed all the patient's concerns surrounding the pathology of their condition. XR imaging was completed in office today and I reviewed results with the patient. I would like to continue with conservative management. I recommend that the patient continues to take 4985-5905 IU of vitamin D per day. The patient should continue to utilize 325 mg of Aspirin for DVT prophylaxis. Fracture care discussed with the patient in detail. Risks of blood clot prevention discussed. Risks of fracture healing versus non healing discussed. Weightbearing rules discussed with the patient. We discussed a through treatment care plan in detail with the patient. In the interm,  I recommend that the patient utilize ice, NSAIDs, and heat PRN. I recommend the patient undergo a course of physical therapy for the bilateral ankle  2-3 times a week for a total of 6-8 weeks. A prescription was given for the physical therapy today. I recommended that under the direction of a Physical Therapist, that the patient transitions out of the CAM boot into an ASO brace. The patient was fitted for the ASO brace in the office today. The patient understood and verbally agreed to the treatment plan. All of their questions were answered and they were satisfied with the visit. The patient should call the office if they have any questions or experience worsening symptoms. I would like to see the patient back in the office in 4-6 weeks to reassess their condition.\par 
172.72

## 2023-03-28 ENCOUNTER — APPOINTMENT (OUTPATIENT)
Dept: OPHTHALMOLOGY | Facility: CLINIC | Age: 78
End: 2023-03-28
Payer: COMMERCIAL

## 2023-03-28 ENCOUNTER — NON-APPOINTMENT (OUTPATIENT)
Age: 78
End: 2023-03-28

## 2023-03-28 PROCEDURE — 92012 INTRM OPH EXAM EST PATIENT: CPT

## 2023-04-18 ENCOUNTER — APPOINTMENT (OUTPATIENT)
Dept: OPHTHALMOLOGY | Facility: CLINIC | Age: 78
End: 2023-04-18
Payer: COMMERCIAL

## 2023-04-18 ENCOUNTER — NON-APPOINTMENT (OUTPATIENT)
Age: 78
End: 2023-04-18

## 2023-04-18 PROCEDURE — 92014 COMPRE OPH EXAM EST PT 1/>: CPT

## 2023-05-03 ENCOUNTER — APPOINTMENT (OUTPATIENT)
Dept: HEART AND VASCULAR | Facility: CLINIC | Age: 78
End: 2023-05-03
Payer: COMMERCIAL

## 2023-05-03 ENCOUNTER — NON-APPOINTMENT (OUTPATIENT)
Age: 78
End: 2023-05-03

## 2023-05-03 PROCEDURE — 93296 REM INTERROG EVL PM/IDS: CPT

## 2023-05-03 PROCEDURE — 93294 REM INTERROG EVL PM/LDLS PM: CPT

## 2023-05-16 ENCOUNTER — APPOINTMENT (OUTPATIENT)
Age: 78
End: 2023-05-16
Payer: COMMERCIAL

## 2023-05-16 VITALS
OXYGEN SATURATION: 96 % | WEIGHT: 174 LBS | DIASTOLIC BLOOD PRESSURE: 87 MMHG | HEIGHT: 67 IN | BODY MASS INDEX: 27.31 KG/M2 | TEMPERATURE: 78 F | SYSTOLIC BLOOD PRESSURE: 134 MMHG | HEART RATE: 84 BPM

## 2023-05-16 PROCEDURE — 99397 PER PM REEVAL EST PAT 65+ YR: CPT

## 2023-05-16 PROCEDURE — 36415 COLL VENOUS BLD VENIPUNCTURE: CPT

## 2023-05-16 NOTE — HISTORY OF PRESENT ILLNESS
[de-identified] : 76 y/o F HTN, HLD, syncope, sinus node dysfunction s/p PPM placement 2005, pre-DM\par \par left groin and buttock pain.  \par \par Working fll time and teaching\par \par Rpt /82\par \par Nocturia - has been an issue for years\par Mammo: last year- f/up q 6 months - \par Due for derm and colonoscopy had ophtho eval

## 2023-05-16 NOTE — PLAN
[FreeTextEntry1] : Wellness complete\par \par HTN not optimal - encourage checking bp at home. \par HLD- cont statin\par Syncope - with PPM - will f/up with Dr Russell\par colonoscopy referral\par derm referral\par dexa referral.  \par ortho referral

## 2023-05-17 LAB
ALBUMIN SERPL ELPH-MCNC: 4.8 G/DL
ALP BLD-CCNC: 116 U/L
ALT SERPL-CCNC: 20 U/L
ANION GAP SERPL CALC-SCNC: 13 MMOL/L
APPEARANCE: CLEAR
AST SERPL-CCNC: 20 U/L
BASOPHILS # BLD AUTO: 0.06 K/UL
BASOPHILS NFR BLD AUTO: 1.1 %
BILIRUB SERPL-MCNC: 1.2 MG/DL
BILIRUBIN URINE: NEGATIVE
BLOOD URINE: NEGATIVE
BUN SERPL-MCNC: 15 MG/DL
CALCIUM SERPL-MCNC: 9.9 MG/DL
CHLORIDE SERPL-SCNC: 103 MMOL/L
CHOLEST SERPL-MCNC: 156 MG/DL
CO2 SERPL-SCNC: 24 MMOL/L
COLOR: YELLOW
CREAT SERPL-MCNC: 0.76 MG/DL
EGFR: 81 ML/MIN/1.73M2
EOSINOPHIL # BLD AUTO: 0.13 K/UL
EOSINOPHIL NFR BLD AUTO: 2.5 %
ESTIMATED AVERAGE GLUCOSE: 126 MG/DL
GLUCOSE QUALITATIVE U: NEGATIVE MG/DL
GLUCOSE SERPL-MCNC: 88 MG/DL
HBA1C MFR BLD HPLC: 6 %
HCT VFR BLD CALC: 39.8 %
HDLC SERPL-MCNC: 64 MG/DL
HGB BLD-MCNC: 13.4 G/DL
IMM GRANULOCYTES NFR BLD AUTO: 0.2 %
KETONES URINE: ABNORMAL MG/DL
LDLC SERPL CALC-MCNC: 73 MG/DL
LEUKOCYTE ESTERASE URINE: ABNORMAL
LYMPHOCYTES # BLD AUTO: 1.84 K/UL
LYMPHOCYTES NFR BLD AUTO: 35.2 %
MAN DIFF?: NORMAL
MCHC RBC-ENTMCNC: 30 PG
MCHC RBC-ENTMCNC: 33.7 GM/DL
MCV RBC AUTO: 89 FL
MONOCYTES # BLD AUTO: 0.39 K/UL
MONOCYTES NFR BLD AUTO: 7.5 %
NEUTROPHILS # BLD AUTO: 2.79 K/UL
NEUTROPHILS NFR BLD AUTO: 53.5 %
NITRITE URINE: NEGATIVE
NONHDLC SERPL-MCNC: 92 MG/DL
PH URINE: 5
PLATELET # BLD AUTO: 295 K/UL
POTASSIUM SERPL-SCNC: 3.7 MMOL/L
PROT SERPL-MCNC: 6.8 G/DL
PROTEIN URINE: NORMAL MG/DL
RBC # BLD: 4.47 M/UL
RBC # FLD: 13.4 %
SODIUM SERPL-SCNC: 140 MMOL/L
SPECIFIC GRAVITY URINE: 1.02
TRIGL SERPL-MCNC: 93 MG/DL
TSH SERPL-ACNC: 2.03 UIU/ML
UROBILINOGEN URINE: 1 MG/DL
WBC # FLD AUTO: 5.22 K/UL

## 2023-06-06 ENCOUNTER — NON-APPOINTMENT (OUTPATIENT)
Age: 78
End: 2023-06-06

## 2023-06-06 ENCOUNTER — APPOINTMENT (OUTPATIENT)
Dept: HEART AND VASCULAR | Facility: CLINIC | Age: 78
End: 2023-06-06
Payer: COMMERCIAL

## 2023-06-06 VITALS
SYSTOLIC BLOOD PRESSURE: 122 MMHG | HEART RATE: 76 BPM | OXYGEN SATURATION: 97 % | WEIGHT: 164 LBS | TEMPERATURE: 97.8 F | HEIGHT: 67 IN | DIASTOLIC BLOOD PRESSURE: 78 MMHG | BODY MASS INDEX: 25.74 KG/M2

## 2023-06-06 PROCEDURE — 99215 OFFICE O/P EST HI 40 MIN: CPT

## 2023-06-06 PROCEDURE — 93000 ELECTROCARDIOGRAM COMPLETE: CPT

## 2023-06-06 NOTE — HISTORY OF PRESENT ILLNESS
[FreeTextEntry1] : \par \par 76 y/o female with h/o htn, hl, SND s/p ppm , predm, asthma, family h/o early cvd who presents for initial evaluation today.\par \par \par notes left sided cp occurs every few months, lasts seconds-minutes, associated w nausea, nonradiating\par 4/10 - on/off for past few years\par notes CONTI w stairs - worse recently \par no syncope\par notes rare LH, palpitations\par no edema, orthopnea, pnd\par \par \par Cath  -  reportedly normal cor's\par Stress echo : normal\par Echo : ef 60-65%, no wma, mild ai, trace tr/pr\par \par followed by EP for ppm\par \par seen by Dr. North in past\par \par walks for exercise\par no mental health issues\par low stress\par vegetarian\par no pregnancy complications\par \par \par \par PMH/PSH:\par htn\par hl\par snd s/p ppm \par predm\par cervical spine disease\par ovarian cyst\par breast nodule\par seasonal allergies\par oa\par asthma\par s/p lap hysterectomy/bso \par s/p right hip replacement\par \par \par ALL:\par norvasc\par amoxicilin\par azithromycin\par latex\par \par MEDS:\par lipitor 10 mg qhs\par diltiazem er 60 mg bid\par ramipril 10 mg qd\par vit D3\par \par \par SH:\par no tobacco\par no etoh\par no drugs\par \par daughter\par RN at [employer] \par from [place]\par \par FH:\par mother - htn  MI 85\par father - MI 60's,  60's\par sister - breast cancer, MS, alive, 72\par brother - alive, 79\par brother - alive, 73, giant cell arteritis

## 2023-06-06 NOTE — DISCUSSION/SUMMARY
[Patient] : the patient [___ Month(s)] : in [unfilled] month(s) [EKG obtained to assist in diagnosis and management of assessed problem(s)] : EKG obtained to assist in diagnosis and management of assessed problem(s) [FreeTextEntry1] : \par \par 76 y/o female with h/o htn, hl, SND s/p ppm 2005, predm, asthma,family h/o cvd who presents for initial evaluation today.\par \par -ordered CTA for cp, aguilar\par -ordered Echo for cp, aguilar\par -continue diltiazem\par -continue lipitor\par -nutrition referral \par -close monitoring blood sugar for predm\par -labs 2023 reviewed\par -ekg ordered - nsr, 1st avb, no st/t changes \par -EP f/up for ppm\par -Cath 2005 - [facility] reportedly normal cor's\par -Stress echo 2021: normal\par -Echo 2021: ef 60-65%, no wma, mild ai, trace tr/pr\par -consider pulm eval for h/o asthma \par -counseled on cvd risk factors\par -f/up 2-3 months for htn, cp, sob \par \par  I have spent 60 minutes reviewing labs, records, tests and discussed htn, cvd risk factor management, cp/sob

## 2023-07-03 ENCOUNTER — APPOINTMENT (OUTPATIENT)
Dept: CT IMAGING | Facility: CLINIC | Age: 78
End: 2023-07-03
Payer: COMMERCIAL

## 2023-07-03 ENCOUNTER — OUTPATIENT (OUTPATIENT)
Dept: OUTPATIENT SERVICES | Facility: HOSPITAL | Age: 78
LOS: 1 days | End: 2023-07-03

## 2023-07-03 ENCOUNTER — APPOINTMENT (OUTPATIENT)
Dept: RADIOLOGY | Facility: CLINIC | Age: 78
End: 2023-07-03
Payer: COMMERCIAL

## 2023-07-03 ENCOUNTER — APPOINTMENT (OUTPATIENT)
Dept: HEART AND VASCULAR | Facility: CLINIC | Age: 78
End: 2023-07-03
Payer: COMMERCIAL

## 2023-07-03 DIAGNOSIS — Z95.0 PRESENCE OF CARDIAC PACEMAKER: Chronic | ICD-10-CM

## 2023-07-03 DIAGNOSIS — Z96.641 PRESENCE OF RIGHT ARTIFICIAL HIP JOINT: Chronic | ICD-10-CM

## 2023-07-03 PROCEDURE — 93306 TTE W/DOPPLER COMPLETE: CPT

## 2023-07-03 PROCEDURE — 77080 DXA BONE DENSITY AXIAL: CPT | Mod: 26

## 2023-07-07 ENCOUNTER — APPOINTMENT (OUTPATIENT)
Dept: CT IMAGING | Facility: CLINIC | Age: 78
End: 2023-07-07

## 2023-07-26 ENCOUNTER — RX RENEWAL (OUTPATIENT)
Age: 78
End: 2023-07-26

## 2023-08-02 ENCOUNTER — APPOINTMENT (OUTPATIENT)
Dept: HEART AND VASCULAR | Facility: CLINIC | Age: 78
End: 2023-08-02
Payer: COMMERCIAL

## 2023-08-02 ENCOUNTER — NON-APPOINTMENT (OUTPATIENT)
Age: 78
End: 2023-08-02

## 2023-08-02 PROCEDURE — 93294 REM INTERROG EVL PM/LDLS PM: CPT

## 2023-08-02 PROCEDURE — 93296 REM INTERROG EVL PM/IDS: CPT

## 2023-08-08 ENCOUNTER — OUTPATIENT (OUTPATIENT)
Dept: OUTPATIENT SERVICES | Facility: HOSPITAL | Age: 78
LOS: 1 days | End: 2023-08-08

## 2023-08-08 ENCOUNTER — APPOINTMENT (OUTPATIENT)
Dept: RADIOLOGY | Facility: CLINIC | Age: 78
End: 2023-08-08
Payer: COMMERCIAL

## 2023-08-08 ENCOUNTER — APPOINTMENT (OUTPATIENT)
Dept: CT IMAGING | Facility: CLINIC | Age: 78
End: 2023-08-08
Payer: COMMERCIAL

## 2023-08-08 DIAGNOSIS — Z95.0 PRESENCE OF CARDIAC PACEMAKER: Chronic | ICD-10-CM

## 2023-08-08 DIAGNOSIS — Z96.641 PRESENCE OF RIGHT ARTIFICIAL HIP JOINT: Chronic | ICD-10-CM

## 2023-08-08 PROCEDURE — 73502 X-RAY EXAM HIP UNI 2-3 VIEWS: CPT | Mod: 26,LT

## 2023-08-08 PROCEDURE — 75574 CT ANGIO HRT W/3D IMAGE: CPT | Mod: 26

## 2023-08-09 ENCOUNTER — NON-APPOINTMENT (OUTPATIENT)
Age: 78
End: 2023-08-09

## 2023-08-21 ENCOUNTER — APPOINTMENT (OUTPATIENT)
Dept: INTERNAL MEDICINE | Facility: CLINIC | Age: 78
End: 2023-08-21
Payer: COMMERCIAL

## 2023-08-21 VITALS
SYSTOLIC BLOOD PRESSURE: 146 MMHG | BODY MASS INDEX: 45.99 KG/M2 | OXYGEN SATURATION: 96 % | HEIGHT: 67 IN | HEART RATE: 62 BPM | TEMPERATURE: 98.1 F | WEIGHT: 293 LBS | RESPIRATION RATE: 14 BRPM | DIASTOLIC BLOOD PRESSURE: 84 MMHG

## 2023-08-21 PROCEDURE — 99213 OFFICE O/P EST LOW 20 MIN: CPT

## 2023-08-21 NOTE — PLAN
[FreeTextEntry1] : CAD-  reviewed in detail will cont asa HTN- elevated today but was fine in June - no change in management at this time Low back pain - referral for PT and encourage f/up with ortho.

## 2023-08-21 NOTE — PHYSICAL EXAM
[Normal] : no acute distress, well nourished, well developed and well-appearing [de-identified] : left upper glut tenderness

## 2023-08-21 NOTE — HISTORY OF PRESENT ILLNESS
[de-identified] : 76 y/o F HTN, HLD, syncope, sinus node dysfunction s/p PPM placement 2005, pre-DM  - s/p CAC score of 3.6 - now on asa 81mg.   - dexa with osteoepenia - left hip pain and low back pain.  - this is worse in the AM    Nocturia - has been an issue for years Mammo: last year- f/up q 6 months -  Due for derm and colonoscopy had ophtho eval

## 2023-08-31 NOTE — REVIEW OF SYSTEMS
[FreeTextEntry1] : Impression: L Inguinal Hernia  [Dyspnea on Exertion] : dyspnea on exertion [Negative] : Heme/Lymph

## 2023-10-13 ENCOUNTER — APPOINTMENT (OUTPATIENT)
Dept: DERMATOLOGY | Facility: CLINIC | Age: 78
End: 2023-10-13
Payer: COMMERCIAL

## 2023-10-13 PROCEDURE — 99204 OFFICE O/P NEW MOD 45 MIN: CPT

## 2023-10-23 ENCOUNTER — APPOINTMENT (OUTPATIENT)
Dept: DERMATOLOGY | Facility: CLINIC | Age: 78
End: 2023-10-23
Payer: COMMERCIAL

## 2023-10-23 ENCOUNTER — RESULT REVIEW (OUTPATIENT)
Age: 78
End: 2023-10-23

## 2023-10-23 ENCOUNTER — LABORATORY RESULT (OUTPATIENT)
Age: 78
End: 2023-10-23

## 2023-10-23 VITALS — WEIGHT: 165 LBS | BODY MASS INDEX: 25.9 KG/M2 | HEIGHT: 67 IN

## 2023-10-23 DIAGNOSIS — D23.9 OTHER BENIGN NEOPLASM OF SKIN, UNSPECIFIED: ICD-10-CM

## 2023-10-23 DIAGNOSIS — Z80.8 FAMILY HISTORY OF MALIGNANT NEOPLASM OF OTHER ORGANS OR SYSTEMS: ICD-10-CM

## 2023-10-23 PROCEDURE — 11102 TANGNTL BX SKIN SINGLE LES: CPT

## 2023-10-23 PROCEDURE — 99214 OFFICE O/P EST MOD 30 MIN: CPT | Mod: 25

## 2023-11-01 ENCOUNTER — NON-APPOINTMENT (OUTPATIENT)
Age: 78
End: 2023-11-01

## 2023-11-01 ENCOUNTER — APPOINTMENT (OUTPATIENT)
Dept: HEART AND VASCULAR | Facility: CLINIC | Age: 78
End: 2023-11-01
Payer: COMMERCIAL

## 2023-11-01 PROCEDURE — 93294 REM INTERROG EVL PM/LDLS PM: CPT

## 2023-11-01 PROCEDURE — 93296 REM INTERROG EVL PM/IDS: CPT

## 2023-12-04 ENCOUNTER — APPOINTMENT (OUTPATIENT)
Dept: MAMMOGRAPHY | Facility: CLINIC | Age: 78
End: 2023-12-04

## 2023-12-04 ENCOUNTER — APPOINTMENT (OUTPATIENT)
Dept: ULTRASOUND IMAGING | Facility: CLINIC | Age: 78
End: 2023-12-04

## 2024-01-03 ENCOUNTER — NON-APPOINTMENT (OUTPATIENT)
Age: 79
End: 2024-01-03

## 2024-01-10 ENCOUNTER — APPOINTMENT (OUTPATIENT)
Dept: BREAST CENTER | Facility: CLINIC | Age: 79
End: 2024-01-10

## 2024-02-02 ENCOUNTER — APPOINTMENT (OUTPATIENT)
Dept: HEART AND VASCULAR | Facility: CLINIC | Age: 79
End: 2024-02-02
Payer: COMMERCIAL

## 2024-02-02 ENCOUNTER — NON-APPOINTMENT (OUTPATIENT)
Age: 79
End: 2024-02-02

## 2024-02-02 PROCEDURE — 93294 REM INTERROG EVL PM/LDLS PM: CPT

## 2024-02-02 PROCEDURE — 93296 REM INTERROG EVL PM/IDS: CPT

## 2024-03-28 ENCOUNTER — NON-APPOINTMENT (OUTPATIENT)
Age: 79
End: 2024-03-28

## 2024-04-02 ENCOUNTER — APPOINTMENT (OUTPATIENT)
Dept: GASTROENTEROLOGY | Facility: CLINIC | Age: 79
End: 2024-04-02
Payer: COMMERCIAL

## 2024-04-02 VITALS
RESPIRATION RATE: 16 BRPM | WEIGHT: 161 LBS | TEMPERATURE: 98.1 F | SYSTOLIC BLOOD PRESSURE: 145 MMHG | HEIGHT: 67 IN | BODY MASS INDEX: 25.27 KG/M2 | HEART RATE: 77 BPM | DIASTOLIC BLOOD PRESSURE: 83 MMHG | OXYGEN SATURATION: 97 %

## 2024-04-02 PROCEDURE — 99205 OFFICE O/P NEW HI 60 MIN: CPT

## 2024-04-02 NOTE — HISTORY OF PRESENT ILLNESS
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   HPI-Went to Dr. Reynoso last year and he recommended one last colonoscopy  last one 10-15 years ago with Dr. Abraham  once a month has burning and regurgitation, vomits liquid phlegm. more at night time   [place] stool score-  Colon cancer screening-  ~ 10-15 years ago   Referred by- Dr. Reynoso   PMHx- syncope s/p pacemaker, growth on ovary, hip fracture  PSHx- hysterectomy 2016  Rx- see chart  Supplements/herbs/OTC- A/c or NSAIDs? baby aspirin  FHx- brother had colon surgery (10 inches removed for diverticulitis)  Allergies- norvasc, latex, amoxicillin, azithromycin  ETOH- none  Smoking- none  Drugs- none  Diet-  Labs/stool tests- CBC/CMP wnl  Breath tests- Imaging-  EGD- believes had long time ago  Colonoscopy-

## 2024-04-02 NOTE — ASSESSMENT
[FreeTextEntry1] : Impression: #Chronic constipation w/o alarm signs #Chronic intermittent reflux #CRC screening #Cardiac hx  Plan: - Will have seen Cardiology for clearance by the time of procedure. Recent ICD interrogation reviewed, wnl. - Lifestyle (adequate water intake, high fiber diet/fiber supplementation, exercise) for constipation discussed. - Schedule upper endoscopy for reflux. - Schedule colonoscopy for CRC screening at [facility]. - Risks (including anesthesia complications, bleeding, infection, perforation) as well as benefits, alternatives, and details of both procedures discussed w/ patient. - Prep instructions discussed at length and written materials provided. - Suprep prep ordered. Pt to take some MiraLAX the night before to help w/ prep.  - Need for chaperone discussed.

## 2024-04-03 LAB
ALBUMIN SERPL ELPH-MCNC: 4.7 G/DL
ALP BLD-CCNC: 122 U/L
ALT SERPL-CCNC: 21 U/L
ANION GAP SERPL CALC-SCNC: 13 MMOL/L
AST SERPL-CCNC: 20 U/L
BASOPHILS # BLD AUTO: 0.07 K/UL
BASOPHILS NFR BLD AUTO: 0.9 %
BILIRUB SERPL-MCNC: 1.2 MG/DL
BUN SERPL-MCNC: 12 MG/DL
CALCIUM SERPL-MCNC: 9.5 MG/DL
CHLORIDE SERPL-SCNC: 105 MMOL/L
CO2 SERPL-SCNC: 24 MMOL/L
CREAT SERPL-MCNC: 0.75 MG/DL
EGFR: 82 ML/MIN/1.73M2
EOSINOPHIL # BLD AUTO: 0.14 K/UL
EOSINOPHIL NFR BLD AUTO: 1.9 %
GLUCOSE SERPL-MCNC: 91 MG/DL
HCT VFR BLD CALC: 37.4 %
HGB BLD-MCNC: 12.8 G/DL
IMM GRANULOCYTES NFR BLD AUTO: 0.1 %
LYMPHOCYTES # BLD AUTO: 1.64 K/UL
LYMPHOCYTES NFR BLD AUTO: 22.3 %
MAN DIFF?: NORMAL
MCHC RBC-ENTMCNC: 29.5 PG
MCHC RBC-ENTMCNC: 34.2 GM/DL
MCV RBC AUTO: 86.2 FL
MONOCYTES # BLD AUTO: 0.51 K/UL
MONOCYTES NFR BLD AUTO: 6.9 %
NEUTROPHILS # BLD AUTO: 5 K/UL
NEUTROPHILS NFR BLD AUTO: 67.9 %
PLATELET # BLD AUTO: 321 K/UL
POTASSIUM SERPL-SCNC: 3.8 MMOL/L
PROT SERPL-MCNC: 6.8 G/DL
RBC # BLD: 4.34 M/UL
RBC # FLD: 13.4 %
SODIUM SERPL-SCNC: 143 MMOL/L
WBC # FLD AUTO: 7.37 K/UL

## 2024-04-04 ENCOUNTER — INPATIENT (INPATIENT)
Facility: HOSPITAL | Age: 79
LOS: 2 days | Discharge: HOME | DRG: 872 | End: 2024-04-07
Attending: STUDENT IN AN ORGANIZED HEALTH CARE EDUCATION/TRAINING PROGRAM | Admitting: STUDENT IN AN ORGANIZED HEALTH CARE EDUCATION/TRAINING PROGRAM
Payer: COMMERCIAL

## 2024-04-04 VITALS
HEART RATE: 106 BPM | OXYGEN SATURATION: 97 % | WEIGHT: 160.94 LBS | DIASTOLIC BLOOD PRESSURE: 95 MMHG | TEMPERATURE: 98 F | HEIGHT: 67 IN | SYSTOLIC BLOOD PRESSURE: 151 MMHG | RESPIRATION RATE: 18 BRPM

## 2024-04-04 DIAGNOSIS — Z96.641 PRESENCE OF RIGHT ARTIFICIAL HIP JOINT: Chronic | ICD-10-CM

## 2024-04-04 DIAGNOSIS — Z95.0 PRESENCE OF CARDIAC PACEMAKER: Chronic | ICD-10-CM

## 2024-04-04 PROCEDURE — 99053 MED SERV 10PM-8AM 24 HR FAC: CPT

## 2024-04-04 PROCEDURE — 99285 EMERGENCY DEPT VISIT HI MDM: CPT

## 2024-04-04 RX ORDER — ONDANSETRON 8 MG/1
4 TABLET, FILM COATED ORAL ONCE
Refills: 0 | Status: COMPLETED | OUTPATIENT
Start: 2024-04-04 | End: 2024-04-04

## 2024-04-04 RX ORDER — SODIUM CHLORIDE 9 MG/ML
1000 INJECTION INTRAMUSCULAR; INTRAVENOUS; SUBCUTANEOUS ONCE
Refills: 0 | Status: COMPLETED | OUTPATIENT
Start: 2024-04-04 | End: 2024-04-04

## 2024-04-04 RX ORDER — ACETAMINOPHEN 500 MG
1000 TABLET ORAL ONCE
Refills: 0 | Status: COMPLETED | OUTPATIENT
Start: 2024-04-04 | End: 2024-04-04

## 2024-04-04 NOTE — ED PROVIDER NOTE - ATTENDING CONTRIBUTION TO CARE
I, Dagoberto Whitten, performed a history and physical exam of the patient and discussed their management with the resident and/or advanced care provider. I reviewed the resident and/or advanced care provider's note and agree with the documented findings and plan of care. I was present and available for all procedures.    See my full note for details

## 2024-04-04 NOTE — ED PROVIDER NOTE - PROGRESS NOTE DETAILS
CT showing signs of infectious versus inflammatory versus ischemic colitis.  Patient is on aspirin.  Localized to watershed area on CTr.  Will admit for GI evaluation

## 2024-04-04 NOTE — ED PROVIDER NOTE - PHYSICAL EXAMINATION
Well appearing and in NAD, head normal appearing atraumatic, trachea midline, no respiratory distress, lungs cta bilaterally, rrr no murmurs, soft Slight lower abdominal tenderness palpation no rebound tenderness no upper abdominal tenderness palpationND abdomen, no visible extremity deformities, Alert and oriented, non focal neuro exam, skin warm and dry, normal affect and mood, no leg swelling, calf ttp or jvd

## 2024-04-04 NOTE — ED PROVIDER NOTE - OBJECTIVE STATEMENT
melissa attending- 78-year-old female past medical history of CAD on aspirin, hypertension permanent pacemaker presenting with lower abdominal cramping nausea vomiting and bloody stool since this evening reports lower abdominal cramping started this evening after eating then reports having multiple episodes of bloody diarrhea bright red blood per rectum afterwards also nausea and vomiting.  No other surgical history besides hysterectomy.  Recent colonoscopy clearance but has not had colonoscopy in 10 years but reported normal in the past denies trauma to area fever chills chest pain shortness of breath dysuria vaginal bleeding

## 2024-04-04 NOTE — ED ADULT TRIAGE NOTE - CHIEF COMPLAINT QUOTE
complaining of bloody stools x 12pm a/w lower abdominal pain.  Endorses weakness, shortness of breath and lightheadedness.   On baby aspirin.

## 2024-04-04 NOTE — ED PROVIDER NOTE - CLINICAL SUMMARY MEDICAL DECISION MAKING FREE TEXT BOX
melissa attending- Patient presenting with lower GI bleed with abdominal cramping vomiting consider gastroenteritis versus diverticular bleed will obtain CT scan abdomen pelvis with IV contrast as well as screening blood work transfuse as necessary disposition pending workup and reevaluation discussed with patient agreed with plan

## 2024-04-05 DIAGNOSIS — E87.6 HYPOKALEMIA: ICD-10-CM

## 2024-04-05 DIAGNOSIS — K52.9 NONINFECTIVE GASTROENTERITIS AND COLITIS, UNSPECIFIED: ICD-10-CM

## 2024-04-05 DIAGNOSIS — Z90.710 ACQUIRED ABSENCE OF BOTH CERVIX AND UTERUS: Chronic | ICD-10-CM

## 2024-04-05 DIAGNOSIS — I25.10 ATHEROSCLEROTIC HEART DISEASE OF NATIVE CORONARY ARTERY WITHOUT ANGINA PECTORIS: ICD-10-CM

## 2024-04-05 DIAGNOSIS — I10 ESSENTIAL (PRIMARY) HYPERTENSION: ICD-10-CM

## 2024-04-05 DIAGNOSIS — Z29.9 ENCOUNTER FOR PROPHYLACTIC MEASURES, UNSPECIFIED: ICD-10-CM

## 2024-04-05 LAB
ADD ON TEST-SPECIMEN IN LAB: SIGNIFICANT CHANGE UP
ALBUMIN SERPL ELPH-MCNC: 4.6 G/DL — SIGNIFICANT CHANGE UP (ref 3.3–5)
ALP SERPL-CCNC: 124 U/L — HIGH (ref 40–120)
ALT FLD-CCNC: 19 U/L — SIGNIFICANT CHANGE UP (ref 10–45)
ANION GAP SERPL CALC-SCNC: 13 MMOL/L — SIGNIFICANT CHANGE UP (ref 5–17)
ANION GAP SERPL CALC-SCNC: 9 MMOL/L — SIGNIFICANT CHANGE UP (ref 5–17)
APPEARANCE UR: CLEAR — SIGNIFICANT CHANGE UP
AST SERPL-CCNC: 17 U/L — SIGNIFICANT CHANGE UP (ref 10–40)
BACTERIA # UR AUTO: NEGATIVE /HPF — SIGNIFICANT CHANGE UP
BASE EXCESS BLDV CALC-SCNC: 3.5 MMOL/L — HIGH (ref -2–3)
BASOPHILS # BLD AUTO: 0.04 K/UL — SIGNIFICANT CHANGE UP (ref 0–0.2)
BASOPHILS NFR BLD AUTO: 0.3 % — SIGNIFICANT CHANGE UP (ref 0–2)
BILIRUB SERPL-MCNC: 1.4 MG/DL — HIGH (ref 0.2–1.2)
BILIRUB UR-MCNC: NEGATIVE — SIGNIFICANT CHANGE UP
BUN SERPL-MCNC: 11 MG/DL — SIGNIFICANT CHANGE UP (ref 7–23)
BUN SERPL-MCNC: 14 MG/DL — SIGNIFICANT CHANGE UP (ref 7–23)
CA-I SERPL-SCNC: 1.25 MMOL/L — SIGNIFICANT CHANGE UP (ref 1.15–1.33)
CALCIUM SERPL-MCNC: 9.2 MG/DL — SIGNIFICANT CHANGE UP (ref 8.4–10.5)
CALCIUM SERPL-MCNC: 9.9 MG/DL — SIGNIFICANT CHANGE UP (ref 8.4–10.5)
CAST: 0 /LPF — SIGNIFICANT CHANGE UP (ref 0–4)
CHLORIDE BLDV-SCNC: 102 MMOL/L — SIGNIFICANT CHANGE UP (ref 96–108)
CHLORIDE SERPL-SCNC: 103 MMOL/L — SIGNIFICANT CHANGE UP (ref 96–108)
CHLORIDE SERPL-SCNC: 106 MMOL/L — SIGNIFICANT CHANGE UP (ref 96–108)
CO2 BLDV-SCNC: 32 MMOL/L — HIGH (ref 22–26)
CO2 SERPL-SCNC: 26 MMOL/L — SIGNIFICANT CHANGE UP (ref 22–31)
CO2 SERPL-SCNC: 27 MMOL/L — SIGNIFICANT CHANGE UP (ref 22–31)
COLOR SPEC: YELLOW — SIGNIFICANT CHANGE UP
CREAT SERPL-MCNC: 0.87 MG/DL — SIGNIFICANT CHANGE UP (ref 0.5–1.3)
CREAT SERPL-MCNC: 0.9 MG/DL — SIGNIFICANT CHANGE UP (ref 0.5–1.3)
DIFF PNL FLD: ABNORMAL
EGFR: 66 ML/MIN/1.73M2 — SIGNIFICANT CHANGE UP
EGFR: 69 ML/MIN/1.73M2 — SIGNIFICANT CHANGE UP
EOSINOPHIL # BLD AUTO: 0.01 K/UL — SIGNIFICANT CHANGE UP (ref 0–0.5)
EOSINOPHIL NFR BLD AUTO: 0.1 % — SIGNIFICANT CHANGE UP (ref 0–6)
GAS PNL BLDV: 137 MMOL/L — SIGNIFICANT CHANGE UP (ref 136–145)
GAS PNL BLDV: SIGNIFICANT CHANGE UP
GAS PNL BLDV: SIGNIFICANT CHANGE UP
GLUCOSE BLDV-MCNC: 121 MG/DL — HIGH (ref 70–99)
GLUCOSE SERPL-MCNC: 118 MG/DL — HIGH (ref 70–99)
GLUCOSE SERPL-MCNC: 93 MG/DL — SIGNIFICANT CHANGE UP (ref 70–99)
GLUCOSE UR QL: NEGATIVE MG/DL — SIGNIFICANT CHANGE UP
HCO3 BLDV-SCNC: 30 MMOL/L — HIGH (ref 22–29)
HCT VFR BLD CALC: 35.4 % — SIGNIFICANT CHANGE UP (ref 34.5–45)
HCT VFR BLD CALC: 42.1 % — SIGNIFICANT CHANGE UP (ref 34.5–45)
HCT VFR BLDA CALC: 44 % — SIGNIFICANT CHANGE UP (ref 34.5–46.5)
HGB BLD CALC-MCNC: 14.6 G/DL — SIGNIFICANT CHANGE UP (ref 11.7–16.1)
HGB BLD-MCNC: 12.3 G/DL — SIGNIFICANT CHANGE UP (ref 11.5–15.5)
HGB BLD-MCNC: 14.3 G/DL — SIGNIFICANT CHANGE UP (ref 11.5–15.5)
IMM GRANULOCYTES NFR BLD AUTO: 0.3 % — SIGNIFICANT CHANGE UP (ref 0–0.9)
KETONES UR-MCNC: NEGATIVE MG/DL — SIGNIFICANT CHANGE UP
LACTATE BLDV-MCNC: 1.3 MMOL/L — SIGNIFICANT CHANGE UP (ref 0.5–2)
LEUKOCYTE ESTERASE UR-ACNC: NEGATIVE — SIGNIFICANT CHANGE UP
LIDOCAIN IGE QN: 27 U/L — SIGNIFICANT CHANGE UP (ref 7–60)
LYMPHOCYTES # BLD AUTO: 1.18 K/UL — SIGNIFICANT CHANGE UP (ref 1–3.3)
LYMPHOCYTES # BLD AUTO: 9.1 % — LOW (ref 13–44)
MAGNESIUM SERPL-MCNC: 1.9 MG/DL — SIGNIFICANT CHANGE UP (ref 1.6–2.6)
MCHC RBC-ENTMCNC: 29.5 PG — SIGNIFICANT CHANGE UP (ref 27–34)
MCHC RBC-ENTMCNC: 30.1 PG — SIGNIFICANT CHANGE UP (ref 27–34)
MCHC RBC-ENTMCNC: 34 GM/DL — SIGNIFICANT CHANGE UP (ref 32–36)
MCHC RBC-ENTMCNC: 34.7 GM/DL — SIGNIFICANT CHANGE UP (ref 32–36)
MCV RBC AUTO: 86.6 FL — SIGNIFICANT CHANGE UP (ref 80–100)
MCV RBC AUTO: 86.8 FL — SIGNIFICANT CHANGE UP (ref 80–100)
MONOCYTES # BLD AUTO: 0.81 K/UL — SIGNIFICANT CHANGE UP (ref 0–0.9)
MONOCYTES NFR BLD AUTO: 6.2 % — SIGNIFICANT CHANGE UP (ref 2–14)
NEUTROPHILS # BLD AUTO: 10.93 K/UL — HIGH (ref 1.8–7.4)
NEUTROPHILS NFR BLD AUTO: 84 % — HIGH (ref 43–77)
NITRITE UR-MCNC: NEGATIVE — SIGNIFICANT CHANGE UP
NRBC # BLD: 0 /100 WBCS — SIGNIFICANT CHANGE UP (ref 0–0)
NRBC # BLD: 0 /100 WBCS — SIGNIFICANT CHANGE UP (ref 0–0)
PCO2 BLDV: 52 MMHG — HIGH (ref 39–42)
PH BLDV: 7.37 — SIGNIFICANT CHANGE UP (ref 7.32–7.43)
PH UR: 5 — SIGNIFICANT CHANGE UP (ref 5–8)
PLATELET # BLD AUTO: 259 K/UL — SIGNIFICANT CHANGE UP (ref 150–400)
PLATELET # BLD AUTO: 341 K/UL — SIGNIFICANT CHANGE UP (ref 150–400)
PO2 BLDV: 23 MMHG — LOW (ref 25–45)
POTASSIUM BLDV-SCNC: 3.3 MMOL/L — LOW (ref 3.5–5.1)
POTASSIUM SERPL-MCNC: 3.2 MMOL/L — LOW (ref 3.5–5.3)
POTASSIUM SERPL-MCNC: 3.3 MMOL/L — LOW (ref 3.5–5.3)
POTASSIUM SERPL-SCNC: 3.2 MMOL/L — LOW (ref 3.5–5.3)
POTASSIUM SERPL-SCNC: 3.3 MMOL/L — LOW (ref 3.5–5.3)
PROT SERPL-MCNC: 7.2 G/DL — SIGNIFICANT CHANGE UP (ref 6–8.3)
PROT UR-MCNC: SIGNIFICANT CHANGE UP MG/DL
RBC # BLD: 4.09 M/UL — SIGNIFICANT CHANGE UP (ref 3.8–5.2)
RBC # BLD: 4.85 M/UL — SIGNIFICANT CHANGE UP (ref 3.8–5.2)
RBC # FLD: 13.2 % — SIGNIFICANT CHANGE UP (ref 10.3–14.5)
RBC # FLD: 13.2 % — SIGNIFICANT CHANGE UP (ref 10.3–14.5)
RBC CASTS # UR COMP ASSIST: 1 /HPF — SIGNIFICANT CHANGE UP (ref 0–4)
REVIEW: SIGNIFICANT CHANGE UP
SAO2 % BLDV: 27.3 % — LOW (ref 67–88)
SODIUM SERPL-SCNC: 142 MMOL/L — SIGNIFICANT CHANGE UP (ref 135–145)
SODIUM SERPL-SCNC: 142 MMOL/L — SIGNIFICANT CHANGE UP (ref 135–145)
SP GR SPEC: >1.03 — HIGH (ref 1–1.03)
SQUAMOUS # UR AUTO: 2 /HPF — SIGNIFICANT CHANGE UP (ref 0–5)
UROBILINOGEN FLD QL: 0.2 MG/DL — SIGNIFICANT CHANGE UP (ref 0.2–1)
WBC # BLD: 13.01 K/UL — HIGH (ref 3.8–10.5)
WBC # BLD: 9.88 K/UL — SIGNIFICANT CHANGE UP (ref 3.8–10.5)
WBC # FLD AUTO: 13.01 K/UL — HIGH (ref 3.8–10.5)
WBC # FLD AUTO: 9.88 K/UL — SIGNIFICANT CHANGE UP (ref 3.8–10.5)
WBC UR QL: 3 /HPF — SIGNIFICANT CHANGE UP (ref 0–5)

## 2024-04-05 PROCEDURE — 99223 1ST HOSP IP/OBS HIGH 75: CPT | Mod: GC

## 2024-04-05 PROCEDURE — 74177 CT ABD & PELVIS W/CONTRAST: CPT | Mod: 26,MC

## 2024-04-05 PROCEDURE — 99223 1ST HOSP IP/OBS HIGH 75: CPT

## 2024-04-05 RX ORDER — CIPROFLOXACIN LACTATE 400MG/40ML
400 VIAL (ML) INTRAVENOUS ONCE
Refills: 0 | Status: COMPLETED | OUTPATIENT
Start: 2024-04-05 | End: 2024-04-05

## 2024-04-05 RX ORDER — DILTIAZEM HCL 120 MG
30 CAPSULE, EXT RELEASE 24 HR ORAL EVERY 6 HOURS
Refills: 0 | Status: DISCONTINUED | OUTPATIENT
Start: 2024-04-05 | End: 2024-04-07

## 2024-04-05 RX ORDER — CIPROFLOXACIN LACTATE 400MG/40ML
VIAL (ML) INTRAVENOUS
Refills: 0 | Status: DISCONTINUED | OUTPATIENT
Start: 2024-04-05 | End: 2024-04-07

## 2024-04-05 RX ORDER — POTASSIUM CHLORIDE 20 MEQ
40 PACKET (EA) ORAL ONCE
Refills: 0 | Status: COMPLETED | OUTPATIENT
Start: 2024-04-05 | End: 2024-04-05

## 2024-04-05 RX ORDER — SODIUM,POTASSIUM PHOSPHATES 278-250MG
1 POWDER IN PACKET (EA) ORAL ONCE
Refills: 0 | Status: COMPLETED | OUTPATIENT
Start: 2024-04-05 | End: 2024-04-05

## 2024-04-05 RX ORDER — ASPIRIN/CALCIUM CARB/MAGNESIUM 324 MG
1 TABLET ORAL
Refills: 0 | DISCHARGE

## 2024-04-05 RX ORDER — LISINOPRIL 2.5 MG/1
40 TABLET ORAL DAILY
Refills: 0 | Status: DISCONTINUED | OUTPATIENT
Start: 2024-04-06 | End: 2024-04-07

## 2024-04-05 RX ORDER — ONDANSETRON 8 MG/1
4 TABLET, FILM COATED ORAL EVERY 8 HOURS
Refills: 0 | Status: DISCONTINUED | OUTPATIENT
Start: 2024-04-05 | End: 2024-04-07

## 2024-04-05 RX ORDER — METRONIDAZOLE 500 MG
500 TABLET ORAL ONCE
Refills: 0 | Status: COMPLETED | OUTPATIENT
Start: 2024-04-05 | End: 2024-04-05

## 2024-04-05 RX ORDER — RAMIPRIL 5 MG
1 CAPSULE ORAL
Qty: 0 | Refills: 0 | DISCHARGE

## 2024-04-05 RX ORDER — CHOLECALCIFEROL (VITAMIN D3) 125 MCG
1000 CAPSULE ORAL DAILY
Refills: 0 | Status: DISCONTINUED | OUTPATIENT
Start: 2024-04-05 | End: 2024-04-07

## 2024-04-05 RX ORDER — CIPROFLOXACIN LACTATE 400MG/40ML
400 VIAL (ML) INTRAVENOUS EVERY 12 HOURS
Refills: 0 | Status: DISCONTINUED | OUTPATIENT
Start: 2024-04-05 | End: 2024-04-07

## 2024-04-05 RX ORDER — ACETAMINOPHEN 500 MG
650 TABLET ORAL EVERY 6 HOURS
Refills: 0 | Status: DISCONTINUED | OUTPATIENT
Start: 2024-04-05 | End: 2024-04-07

## 2024-04-05 RX ORDER — DILTIAZEM HCL 120 MG
0 CAPSULE, EXT RELEASE 24 HR ORAL
Qty: 0 | Refills: 0 | DISCHARGE

## 2024-04-05 RX ORDER — ATORVASTATIN CALCIUM 80 MG/1
20 TABLET, FILM COATED ORAL AT BEDTIME
Refills: 0 | Status: DISCONTINUED | OUTPATIENT
Start: 2024-04-05 | End: 2024-04-07

## 2024-04-05 RX ORDER — RAMIPRIL 5 MG
1 CAPSULE ORAL
Refills: 0 | DISCHARGE

## 2024-04-05 RX ORDER — ASPIRIN/CALCIUM CARB/MAGNESIUM 324 MG
0 TABLET ORAL
Qty: 0 | Refills: 0 | DISCHARGE

## 2024-04-05 RX ORDER — ASPIRIN/CALCIUM CARB/MAGNESIUM 324 MG
81 TABLET ORAL DAILY
Refills: 0 | Status: DISCONTINUED | OUTPATIENT
Start: 2024-04-05 | End: 2024-04-07

## 2024-04-05 RX ORDER — DILTIAZEM HCL 120 MG
1 CAPSULE, EXT RELEASE 24 HR ORAL
Refills: 0 | DISCHARGE

## 2024-04-05 RX ORDER — LANOLIN ALCOHOL/MO/W.PET/CERES
3 CREAM (GRAM) TOPICAL AT BEDTIME
Refills: 0 | Status: DISCONTINUED | OUTPATIENT
Start: 2024-04-05 | End: 2024-04-07

## 2024-04-05 RX ORDER — METRONIDAZOLE 500 MG
500 TABLET ORAL EVERY 8 HOURS
Refills: 0 | Status: DISCONTINUED | OUTPATIENT
Start: 2024-04-05 | End: 2024-04-07

## 2024-04-05 RX ORDER — ATORVASTATIN CALCIUM 80 MG/1
1 TABLET, FILM COATED ORAL
Refills: 0 | DISCHARGE

## 2024-04-05 RX ORDER — ACETAMINOPHEN 500 MG
1000 TABLET ORAL ONCE
Refills: 0 | Status: COMPLETED | OUTPATIENT
Start: 2024-04-05 | End: 2024-04-05

## 2024-04-05 RX ORDER — METRONIDAZOLE 500 MG
TABLET ORAL
Refills: 0 | Status: DISCONTINUED | OUTPATIENT
Start: 2024-04-05 | End: 2024-04-07

## 2024-04-05 RX ORDER — SODIUM CHLORIDE 9 MG/ML
1000 INJECTION, SOLUTION INTRAVENOUS
Refills: 0 | Status: COMPLETED | OUTPATIENT
Start: 2024-04-05 | End: 2024-04-05

## 2024-04-05 RX ADMIN — Medication 40 MILLIEQUIVALENT(S): at 10:49

## 2024-04-05 RX ADMIN — ONDANSETRON 4 MILLIGRAM(S): 8 TABLET, FILM COATED ORAL at 00:24

## 2024-04-05 RX ADMIN — SODIUM CHLORIDE 100 MILLILITER(S): 9 INJECTION, SOLUTION INTRAVENOUS at 12:35

## 2024-04-05 RX ADMIN — Medication 200 MILLIGRAM(S): at 10:49

## 2024-04-05 RX ADMIN — Medication 200 MILLIGRAM(S): at 17:32

## 2024-04-05 RX ADMIN — Medication 81 MILLIGRAM(S): at 22:50

## 2024-04-05 RX ADMIN — Medication 1000 MILLIGRAM(S): at 13:58

## 2024-04-05 RX ADMIN — SODIUM CHLORIDE 1000 MILLILITER(S): 9 INJECTION INTRAMUSCULAR; INTRAVENOUS; SUBCUTANEOUS at 00:27

## 2024-04-05 RX ADMIN — ONDANSETRON 4 MILLIGRAM(S): 8 TABLET, FILM COATED ORAL at 12:55

## 2024-04-05 RX ADMIN — Medication 100 MILLIGRAM(S): at 22:50

## 2024-04-05 RX ADMIN — Medication 650 MILLIGRAM(S): at 13:59

## 2024-04-05 RX ADMIN — Medication 1000 MILLIGRAM(S): at 04:37

## 2024-04-05 RX ADMIN — ATORVASTATIN CALCIUM 20 MILLIGRAM(S): 80 TABLET, FILM COATED ORAL at 22:50

## 2024-04-05 RX ADMIN — Medication 1 PACKET(S): at 02:32

## 2024-04-05 RX ADMIN — Medication 650 MILLIGRAM(S): at 12:16

## 2024-04-05 RX ADMIN — Medication 400 MILLIGRAM(S): at 00:23

## 2024-04-05 RX ADMIN — Medication 100 MILLIGRAM(S): at 12:34

## 2024-04-05 RX ADMIN — SODIUM CHLORIDE 1000 MILLILITER(S): 9 INJECTION INTRAMUSCULAR; INTRAVENOUS; SUBCUTANEOUS at 06:43

## 2024-04-05 RX ADMIN — Medication 400 MILLIGRAM(S): at 06:43

## 2024-04-05 RX ADMIN — Medication 1000 UNIT(S): at 22:50

## 2024-04-05 NOTE — H&P ADULT - PROBLEM/PLAN-5
SUBJECTIVE:   Miroslava Lee is a 12 y.o. female who presents to the office today with mother for routine health care examination.    PMH: essentially negative    FH: noncontributory    SH: presently in grade 5; doing well in school.     ROS: No unusual headaches or abdominal pain. No cough, wheezing, shortness of breath, bowel or bladder problems. Diet is good.    OBJECTIVE:   GENERAL: WDWN female  EYES: PERRLA, EOMI, fundi grossly normal  EARS: TM's gray  VISION and HEARING: Normal.  NOSE: nasal passages clear  NECK: supple, no masses, no lymphadenopathy  RESP: clear to auscultation bilaterally  CV: RRR, normal S1/S2, no murmurs, clicks, or rubs.  ABD: soft, nontender, no masses, no hepatosplenomegaly  : not examined  MS: spine straight, FROM all joints  SKIN: no rashes or lesions    ASSESSMENT:   Well Child    PLAN:   Plan per orders.  Adacel Menactra and flu  Counseling regarding the following: diet and school issues.  Follow up as needed.  Answers for HPI/ROS submitted by the patient on 10/10/2019   activity change: No  appetite change : No  fever: No  congestion: No  sore throat: No  eye discharge: No  eye redness: No  cough: No  wheezing: No  palpitations: No  chest pain: No  constipation: No  diarrhea: No  vomiting: No  difficulty urinating: No  hematuria: No  enuresis: No  rash: No  wound: No  behavior problem: No  sleep disturbance: No  headaches: No  syncope: No     DISPLAY PLAN FREE TEXT

## 2024-04-05 NOTE — H&P ADULT - TIME BILLING
- Reviewing, and interpreting labs and testing.  - Independently obtaining a review of systems and performing a physical exam  - Reviewing consultant documentation/recommendations in addition to discussing plan of care with consultants.  - Counselling and educating patient  regarding interpretation of aforementioned items and plan of care.

## 2024-04-05 NOTE — H&P ADULT - NSHPREVIEWOFSYSTEMS_GEN_ALL_CORE
Review of Systems:   CONSTITUTIONAL: No fever, weight loss  EYES: No eye pain, visual disturbances, or discharge  ENMT:  No difficulty hearing, tinnitus, vertigo; No sinus or throat pain  RESPIRATORY: No SOB. No cough, wheezing, chills or hemoptysis  CARDIOVASCULAR: +dizziness No chest pain, palpitations, or leg swelling  GASTROINTESTINAL: +abd pain, +n/v, + hematochezia  GENITOURINARY: No dysuria, frequency, hematuria, or incontinence  NEUROLOGICAL: No headaches, memory loss, loss of strength, numbness, or tremors  SKIN: No itching, burning, rashes, or lesions   ENDOCRINE: No heat or cold intolerance; No hair loss  MUSCULOSKELETAL: No joint pain or swelling; No muscle, back pain  PSYCHIATRIC: No depression, anxiety, mood swings, or difficulty sleeping  HEME/LYMPH: No easy bruising, or bleeding gums

## 2024-04-05 NOTE — H&P ADULT - NSICDXPASTSURGICALHX_GEN_ALL_CORE_FT
PAST SURGICAL HISTORY:  Artificial pacemaker July 2005    History of right hip replacement right total hip replacement  2014    Open fracture of neck of femur Hip fracture requiring operative repair, right, open type I or II, initial encounter  2003    Presence of cardiac pacemaker Pacemaker    S/P hysterectomy with oophorectomy

## 2024-04-05 NOTE — H&P ADULT - HISTORY OF PRESENT ILLNESS
79 yo F PMH CAD on ASA, HTN, PPM p/w BRBPR. Pt reports for the past few days feeling nauseous, followed by NBNB vomiting and acute onset lower abdominal cramping night prior to admission. She noticed associated BRBPR and diarrhea with blood clots occurring every 30 minutes, now decreased to once per hour. She had associated lightheadedness with ambulation. This has never happened to her in the past. Her last cscope was 5-10  years ago and reports was wnl. She denies any fevers, chills, sob. Tylenol and zofran helped with her symptoms in the ED.    In the ED VSS  Meds received 1L NS, tylenol 1 g iv x 2, Sodium/potassium phos, zofran 4 mg iv

## 2024-04-05 NOTE — CONSULT NOTE ADULT - ATTENDING COMMENTS
Agree with above. Patient with left sided colitis on imaging, in setting of acute bloody diarrhea and abdominal pain. Both pain and bleeding both subsiding, most likely acute left sided ischemic colitis in watershed distribution. Given rapid improvement, would continue supportive care, antibiotics. Advance diet to low residual diet as tolerated. Hold off on flex sig given significant improvement and it is unlikely to . Follow-up as outpatient in 6-8 weeks after resolution for routine colonoscopy.

## 2024-04-05 NOTE — ED ADULT NURSE NOTE - NSICDXPASTSURGICALHX_GEN_ALL_CORE_FT
PAST SURGICAL HISTORY:  Artificial pacemaker July 2005    History of right hip replacement right total hip replacement  2014    Open fracture of neck of femur Hip fracture requiring operative repair, right, open type I or II, initial encounter  2003    Presence of cardiac pacemaker Pacemaker    
no
no

## 2024-04-05 NOTE — H&P ADULT - PROBLEM SELECTOR PLAN 4
controlled  - c/w home ramipril equivalent and diltiazem equivalent with hold parameter for SBP<110 goal not to overtreat  - re-eval BP prior to dc - may need less BP meds overall as hypotension can contribute to ischemic colitis

## 2024-04-05 NOTE — H&P ADULT - NSICDXFAMILYHX_GEN_ALL_CORE_FT
FAMILY HISTORY:  Sibling  Still living? Unknown  Family history of colitis, Age at diagnosis: Age Unknown

## 2024-04-05 NOTE — H&P ADULT - NSHPPHYSICALEXAM_GEN_ALL_CORE
Vital Signs Last 24 Hrs  T(C): 36.5 (05 Apr 2024 08:16), Max: 37.1 (04 Apr 2024 23:15)  T(F): 97.7 (05 Apr 2024 08:16), Max: 98.8 (04 Apr 2024 23:15)  HR: 60 (05 Apr 2024 08:16) (60 - 106)  BP: 138/80 (05 Apr 2024 08:16) (131/75 - 151/95)  BP(mean): 105 (05 Apr 2024 04:16) (93 - 105)  RR: 16 (05 Apr 2024 08:16) (16 - 18)  SpO2: 97% (05 Apr 2024 08:16) (95% - 97%)    Parameters below as of 05 Apr 2024 08:16  Patient On (Oxygen Delivery Method): room air        PHYSICAL EXAM:  GENERAL:  Well appearing, in NAD  HEAD:  NCAT  EYES: conjunctiva clear  NECK: Supple, No JVD  CHEST/LUNG: CTA B/L. No w/r/r.  HEART: Reg rate. Normal S1, S2. No m/r/g.   ABDOMEN: Soft, mild ttp lower abd, no guarding or rebound  EXTREMITIES:  2+ Peripheral Pulses, No clubbing, cyanosis, edema.  PSYCH: AAOx3, appropriate affect  SKIN: No rashes or lesions

## 2024-04-05 NOTE — H&P ADULT - NSHPLABSRESULTS_GEN_ALL_CORE
LABS:                         12.3   9.88  )-----------( 259      ( 05 Apr 2024 10:15 )             35.4     04-05    142  |  106  |  11  ----------------------------<  93  3.2<L>   |  27  |  0.87    Ca    9.2      05 Apr 2024 10:16    TPro  7.2  /  Alb  4.6  /  TBili  1.4<H>  /  DBili  x   /  AST  17  /  ALT  19  /  AlkPhos  124<H>  04-05      Urinalysis Basic - ( 05 Apr 2024 10:16 )    Color: x / Appearance: x / SG: x / pH: x  Gluc: 93 mg/dL / Ketone: x  / Bili: x / Urobili: x   Blood: x / Protein: x / Nitrite: x   Leuk Esterase: x / RBC: x / WBC x   Sq Epi: x / Non Sq Epi: x / Bacteria: x      Labs reviewed remarkable for hypokalemia  EKG personally reviewed NSR, no sted  CTAP  IMPRESSION:  1. No evidence of an active gastrointestinal hemorrhage.  2. Colitis involving the splenic flexure, descending colon and proximal   to mid sigmoid colon which is infectious, inflammatory or ischemic in   etiology.

## 2024-04-05 NOTE — H&P ADULT - PROBLEM SELECTOR PLAN 1
likely ischemic colitis given distribution on CTAP, associated colicky pain and BRBPR  with downtrend in hgb but without anemia  suspect 2/2 low flow state  - monitor CBC  - GI consulted, appreciate recs, consideration for flex sig  - some data to suggest improvement with abx - started on cipro/flagyl, can switch to po on discharge  - c/w ivf for now  - zofran prn  - clear diet  - GI PCR , stool cx pending likely ischemic colitis given distribution on CTAP, associated colicky pain and BRBPR  with downtrend in hgb but without anemia  suspect 2/2 low flow state  met sepsis criteria - tachycardia and leukocytosis in the ED  - monitor CBC  - GI consulted, appreciate recs, consideration for flex sig  - some data to suggest improvement with abx - started on cipro/flagyl, can switch to po on discharge  - c/w ivf for now  - zofran prn  - clear diet  - GI PCR , stool cx pending

## 2024-04-05 NOTE — CONSULT NOTE ADULT - ASSESSMENT
78 year old with PMH significant for CADm HTN, HLD, syncope, sinus node dysfunction s/p PPM placement 2005 who initially presented with 1 day history of BRBPR. GI consulted for further evaluation and management     #BRBPR   - given onset of hematochezia started following nausea, vomiting and dizziness along with a history of CAD, and CT findings showing colitis involving the splenic flexure, descending colon and proximal to mid sigmoid colon, most likely etiology is ischemic colitis vs less likely diverticular bleed   - continue supportive cart to maintain hemodynamics   - would send GI PCR and c. diff   - would continue 5-7 day course of cipro and flagyl   - lactate 1.3, WBC 13, hgb 14.3   - if clinically worsening, can consider flex sig for further evaluation     Recommendations not finalized until case discussed with attending.  78 year old with PMH significant for CADm HTN, HLD, syncope, sinus node dysfunction s/p PPM placement 2005 who initially presented with 1 day history of BRBPR. GI consulted for further evaluation and management     #BRBPR   - given onset of hematochezia started following nausea, vomiting and dizziness along with a history of CAD, and CT findings showing colitis involving the splenic flexure, descending colon and proximal to mid sigmoid colon, most likely etiology is ischemic colitis vs less likely diverticular bleed   - continue supportive cart to maintain hemodynamics   - would send GI PCR and c. diff   - would continue 5-7 day course of cipro and flagyl   - lactate 1.3, WBC 13, hgb 14.3   - if clinically worsening, can consider flex sig for further evaluation     Case discussed with attending, Dr. Wing   78 year old with PMH significant for CADm HTN, HLD, syncope, sinus node dysfunction s/p PPM placement 2005 who initially presented with 1 day history of BRBPR. GI consulted for further evaluation and management     #BRBPR   - given onset of hematochezia started following nausea, vomiting and dizziness along with a history of CAD, and CT findings showing colitis involving the splenic flexure, descending colon and proximal to mid sigmoid colon, most likely etiology is ischemic colitis   - continue supportive cart to maintain hemodynamics   - would send GI PCR and c. diff   - would continue 5-7 day course of cipro and flagyl   - Slowly advance diet to low residual diet as tolerated  - lactate 1.3, WBC 13, hgb 14.3   - if clinically worsening, can consider flex sig for further evaluation     Case discussed with attending, Dr. Wing

## 2024-04-05 NOTE — ED ADULT NURSE NOTE - OBJECTIVE STATEMENT
78 y.o F BIB self p/w c/o bloody stools. A+Ox4. Pt states for x1&1/2 days started having sudden onset nausea a/w lower abd cramping, n/v and bloody diarrhea. States vomiting occurred after diarrhea, and states stool is "bright red". States was dx w/ diverticulitis x10 yr ago but has never followed up. States symptoms worsened after eating this evening, so came to ER for further eval. Also  was seen at GI on tuesday for acid reflux and has endoscopy scheduled. Last colonoscopy x10 yrs ago. Endorsing chills, no fever. PMH CAD on aspirin, and HTN w/ permanents pacemaker. No other complaints at this time, comfort and safety maintained.

## 2024-04-05 NOTE — H&P ADULT - ASSESSMENT
79 yo F PMH CAD on ASA, HTN, PPM p/w BRBPR and lower abd pain adm with ischemic colitis. 77 yo F PMH CAD on ASA, HTN, PPM p/w BRBPR and lower abd pain adm with sepsis due to acute ischemic colitis.

## 2024-04-05 NOTE — CONSULT NOTE ADULT - SUBJECTIVE AND OBJECTIVE BOX
Chief Complaint:  Patient is a 78y old  Female who presents with a chief complaint of     HPI: 78 year old with PMH significant for CADm HTN, HLD, syncope, sinus node dysfunction s/p PPM placement 2005 who initially presented with 1 day history of BRBPR. Patient states that yesterday she started having nausea, non-bloody-non-bilious vomiting, fever and chills, which made her light-headed and dizzy. Shortly thereafter, patient began having BRBPR and lower abdominal cramping. Patient states that she is continuing to pass clots and is having lower abdominal pain with each BM. Patient was recently cleared for outpatient colonoscopy as her last one was 10 years. At this time, patient denies any fever, chills, light-headedness, dizziness, chest pain, shortness of breath, nausea, vomiting, or constipation.     Allergies:  Zithromax (Short breath)  latex (Urticaria; Rash; Hives)  penicillin (Anaphylaxis)  Norvasc (Rash)      Home Medications:    Hospital Medications:  acetaminophen     Tablet .. 650 milliGRAM(s) Oral every 6 hours PRN  ciprofloxacin   IVPB      ciprofloxacin   IVPB 400 milliGRAM(s) IV Intermittent once  ciprofloxacin   IVPB 400 milliGRAM(s) IV Intermittent every 12 hours  melatonin 3 milliGRAM(s) Oral at bedtime PRN  metroNIDAZOLE  IVPB      metroNIDAZOLE  IVPB 500 milliGRAM(s) IV Intermittent once  metroNIDAZOLE  IVPB 500 milliGRAM(s) IV Intermittent every 8 hours  potassium chloride    Tablet ER 40 milliEquivalent(s) Oral once      PMHX/PSHX:  Asthma    Essential hypertension    Coronary artery disease without angina pectoris, unspecified vessel or lesion type, unspecified whether native or transplanted heart    Pacemaker    Cyst of right ovary    Presence of cardiac pacemaker    Open fracture of neck of femur    Artificial pacemaker    History of right hip replacement        Family history: ?brother recently diagnosing with pouchitis    Social History: patient denies cigarette use, alcohol use, or illicit substance use       REVIEW OF SYSTEMS:    CONSTITUTIONAL: no weakness, + fevers, + chills  EYES/ENT: No visual changes;  No vertigo or throat pain   NECK: No pain or stiffness  RESPIRATORY: No cough, wheezing, hemoptysis; No shortness of breath  CARDIOVASCULAR: No chest pain or palpitations  GASTROINTESTINAL: + lower abdominal pain. + nausea, + vomiting, no hematemesis; + diarrhea, no constipation. No melena, + hematochezia.  GENITOURINARY: No dysuria, frequency or hematuria  NEUROLOGICAL: No numbness or weakness  All other review of systems is negative unless indicated above.    PHYSICAL EXAM:     General: NAD, well groomed, well developed   Head: atraumatic, normocephalic   Eyes: pupils equal, round, reactive to light, anicteric sclera   ENMT: moist mucous membranes   Neck: supple, no JVD, no tender lymphadenopathy   Heart: regular rate and rhythm, normal s1,s2, no murmurs, rubs, or gallops   Abdomen: soft, non-tender, non-distended, normoactive bowel sounds   Extremities: warm, well perfused, lower extremity edema   Neuro: AOX3, no focal neurological deficits     Vital Signs:  Vital Signs Last 24 Hrs  T(C): 36.5 (05 Apr 2024 08:16), Max: 37.1 (04 Apr 2024 23:15)  T(F): 97.7 (05 Apr 2024 08:16), Max: 98.8 (04 Apr 2024 23:15)  HR: 60 (05 Apr 2024 08:16) (60 - 106)  BP: 138/80 (05 Apr 2024 08:16) (131/75 - 151/95)  BP(mean): 105 (05 Apr 2024 04:16) (93 - 105)  RR: 16 (05 Apr 2024 08:16) (16 - 18)  SpO2: 97% (05 Apr 2024 08:16) (95% - 97%)    Parameters below as of 05 Apr 2024 08:16  Patient On (Oxygen Delivery Method): room air      Daily Height in cm: 170.18 (04 Apr 2024 22:21)    Daily     LABS:                        14.3   13.01 )-----------( 341      ( 05 Apr 2024 00:34 )             42.1     Mean Cell Volume: 86.8 fl (04-05-24 @ 00:34)    04-05    142  |  103  |  14  ----------------------------<  118<H>  3.3<L>   |  26  |  0.90    Ca    9.9      05 Apr 2024 00:34    TPro  7.2  /  Alb  4.6  /  TBili  1.4<H>  /  DBili  x   /  AST  17  /  ALT  19  /  AlkPhos  124<H>  04-05    LIVER FUNCTIONS - ( 05 Apr 2024 00:34 )  Alb: 4.6 g/dL / Pro: 7.2 g/dL / ALK PHOS: 124 U/L / ALT: 19 U/L / AST: 17 U/L / GGT: x             Urinalysis Basic - ( 05 Apr 2024 06:42 )    Color: Yellow / Appearance: Clear / SG: >1.030 / pH: x  Gluc: x / Ketone: Negative mg/dL  / Bili: Negative / Urobili: 0.2 mg/dL   Blood: x / Protein: Trace mg/dL / Nitrite: Negative   Leuk Esterase: Negative / RBC: 1 /HPF / WBC 3 /HPF   Sq Epi: x / Non Sq Epi: 2 /HPF / Bacteria: Negative /HPF      Amylase Serum--      Lipase serum27       Ammonia--                          14.3   13.01 )-----------( 341      ( 05 Apr 2024 00:34 )             42.1     Imaging:    < from: CT Abdomen and Pelvis w/ IV Cont (04.05.24 @ 01:46) >  CT of the Abdomen and Pelvis was performed.  Precontrast, Arterial and Delayed phases were performed.  Sagittal and coronal reformats were performed.    FINDINGS:  LOWER CHEST: Bilateral lower lobe dependent atelectasis. Cardiomegaly.   Pacemaker leads in the right atrium and right ventricle.    LIVER: Within normal limits.  BILE DUCTS: Normal caliber.  GALLBLADDER: Within normal limits.  SPLEEN: Within normal limits.  PANCREAS: Within normal limits.  ADRENALS: Within normal limits.  KIDNEYS/URETERS: Bilateral renal cysts. No hydronephrosis.    BLADDER: Minimallydistended.  REPRODUCTIVE ORGANS: Hysterectomy. No adnexal masses.    BOWEL: Circumferential submucosal edema and pericolonic inflammatory   change involving the splenic flexure, descending colon and proximal to   mid sigmoid colon compatible with a colitis. Colonic diverticulosis. No   bowel obstruction. Appendix is normal. No intraluminal extravasation of   intravenous contrast to suggest an active gastrointestinal hemorrhage.  PERITONEUM: Trace pelvic free fluid.  VESSELS: Within normal limits.  RETROPERITONEUM/LYMPH NODES: No lymphadenopathy.  ABDOMINAL WALL: Within normal limits.  BONES: Degenerative changes of the spine. Mild S-shaped thoracolumbar   scoliosis. Partially visualized total right hip arthroplasty. Moderate   left hip osteoarthritis.    IMPRESSION:  1. No evidence of an active gastrointestinal hemorrhage.  2. Colitis involving the splenic flexure, descending colon and proximal   to mid sigmoid colon which is infectious, inflammatory or ischemic in   etiology.               Chief Complaint:  Patient is a 78y old  Female who presents with a chief complaint of     HPI: 78 year old with PMH significant for CADm HTN, HLD, syncope, sinus node dysfunction s/p PPM placement 2005 who initially presented with 1 day history of BRBPR. Patient states that yesterday she started having nausea, non-bloody-non-bilious vomiting, fever and chills, which made her light-headed and dizzy. Shortly thereafter, patient began having BRBPR and lower abdominal cramping. Patient states that she is continuing to pass clots and is having lower abdominal pain with each BM. Patient was recently cleared for outpatient colonoscopy as her last one was 10 years. At this time, patient denies any fever, chills, light-headedness, dizziness, chest pain, shortness of breath, nausea, vomiting, or constipation.     Allergies:  Zithromax (Short breath)  latex (Urticaria; Rash; Hives)  penicillin (Anaphylaxis)  Norvasc (Rash)      Home Medications:    Hospital Medications:  acetaminophen     Tablet .. 650 milliGRAM(s) Oral every 6 hours PRN  ciprofloxacin   IVPB      ciprofloxacin   IVPB 400 milliGRAM(s) IV Intermittent once  ciprofloxacin   IVPB 400 milliGRAM(s) IV Intermittent every 12 hours  melatonin 3 milliGRAM(s) Oral at bedtime PRN  metroNIDAZOLE  IVPB      metroNIDAZOLE  IVPB 500 milliGRAM(s) IV Intermittent once  metroNIDAZOLE  IVPB 500 milliGRAM(s) IV Intermittent every 8 hours  potassium chloride    Tablet ER 40 milliEquivalent(s) Oral once      PMHX/PSHX:  Asthma    Essential hypertension    Coronary artery disease without angina pectoris, unspecified vessel or lesion type, unspecified whether native or transplanted heart    Pacemaker    Cyst of right ovary    Presence of cardiac pacemaker    Open fracture of neck of femur    Artificial pacemaker    History of right hip replacement        Family history: ?brother recently diagnosing with pouchitis    Social History: patient denies cigarette use, alcohol use, or illicit substance use       REVIEW OF SYSTEMS:    CONSTITUTIONAL: no weakness, + fevers, + chills  EYES/ENT: No visual changes;  No vertigo or throat pain   NECK: No pain or stiffness  RESPIRATORY: No cough, wheezing, hemoptysis; No shortness of breath  CARDIOVASCULAR: No chest pain or palpitations  GASTROINTESTINAL: + lower abdominal pain. + nausea, + vomiting, no hematemesis; + diarrhea, no constipation. No melena, + hematochezia.  GENITOURINARY: No dysuria, frequency or hematuria  NEUROLOGICAL: No numbness or weakness  All other review of systems is negative unless indicated above.    PHYSICAL EXAM:     General: NAD, well groomed, well developed   Head: atraumatic, normocephalic   Eyes: pupils equal, round, reactive to light, anicteric sclera   ENMT: moist mucous membranes   Neck: supple, no JVD, no tender lymphadenopathy   Heart: regular rate and rhythm, normal s1,s2, no murmurs, rubs, or gallops   Abdomen: soft, non-distended, moderately tender to palpation along lower abdomen, no rebound tenderness or involuntary guarding, normoactive bowel sounds   Extremities: warm, well perfused, lower extremity edema   Neuro: AOX3, no focal neurological deficits     Vital Signs:  Vital Signs Last 24 Hrs  T(C): 36.5 (05 Apr 2024 08:16), Max: 37.1 (04 Apr 2024 23:15)  T(F): 97.7 (05 Apr 2024 08:16), Max: 98.8 (04 Apr 2024 23:15)  HR: 60 (05 Apr 2024 08:16) (60 - 106)  BP: 138/80 (05 Apr 2024 08:16) (131/75 - 151/95)  BP(mean): 105 (05 Apr 2024 04:16) (93 - 105)  RR: 16 (05 Apr 2024 08:16) (16 - 18)  SpO2: 97% (05 Apr 2024 08:16) (95% - 97%)    Parameters below as of 05 Apr 2024 08:16  Patient On (Oxygen Delivery Method): room air      Daily Height in cm: 170.18 (04 Apr 2024 22:21)    Daily     LABS:                        14.3   13.01 )-----------( 341      ( 05 Apr 2024 00:34 )             42.1     Mean Cell Volume: 86.8 fl (04-05-24 @ 00:34)    04-05    142  |  103  |  14  ----------------------------<  118<H>  3.3<L>   |  26  |  0.90    Ca    9.9      05 Apr 2024 00:34    TPro  7.2  /  Alb  4.6  /  TBili  1.4<H>  /  DBili  x   /  AST  17  /  ALT  19  /  AlkPhos  124<H>  04-05    LIVER FUNCTIONS - ( 05 Apr 2024 00:34 )  Alb: 4.6 g/dL / Pro: 7.2 g/dL / ALK PHOS: 124 U/L / ALT: 19 U/L / AST: 17 U/L / GGT: x             Urinalysis Basic - ( 05 Apr 2024 06:42 )    Color: Yellow / Appearance: Clear / SG: >1.030 / pH: x  Gluc: x / Ketone: Negative mg/dL  / Bili: Negative / Urobili: 0.2 mg/dL   Blood: x / Protein: Trace mg/dL / Nitrite: Negative   Leuk Esterase: Negative / RBC: 1 /HPF / WBC 3 /HPF   Sq Epi: x / Non Sq Epi: 2 /HPF / Bacteria: Negative /HPF      Amylase Serum--      Lipase serum27       Ammonia--                          14.3   13.01 )-----------( 341      ( 05 Apr 2024 00:34 )             42.1     Imaging:    < from: CT Abdomen and Pelvis w/ IV Cont (04.05.24 @ 01:46) >  CT of the Abdomen and Pelvis was performed.  Precontrast, Arterial and Delayed phases were performed.  Sagittal and coronal reformats were performed.    FINDINGS:  LOWER CHEST: Bilateral lower lobe dependent atelectasis. Cardiomegaly.   Pacemaker leads in the right atrium and right ventricle.    LIVER: Within normal limits.  BILE DUCTS: Normal caliber.  GALLBLADDER: Within normal limits.  SPLEEN: Within normal limits.  PANCREAS: Within normal limits.  ADRENALS: Within normal limits.  KIDNEYS/URETERS: Bilateral renal cysts. No hydronephrosis.    BLADDER: Minimallydistended.  REPRODUCTIVE ORGANS: Hysterectomy. No adnexal masses.    BOWEL: Circumferential submucosal edema and pericolonic inflammatory   change involving the splenic flexure, descending colon and proximal to   mid sigmoid colon compatible with a colitis. Colonic diverticulosis. No   bowel obstruction. Appendix is normal. No intraluminal extravasation of   intravenous contrast to suggest an active gastrointestinal hemorrhage.  PERITONEUM: Trace pelvic free fluid.  VESSELS: Within normal limits.  RETROPERITONEUM/LYMPH NODES: No lymphadenopathy.  ABDOMINAL WALL: Within normal limits.  BONES: Degenerative changes of the spine. Mild S-shaped thoracolumbar   scoliosis. Partially visualized total right hip arthroplasty. Moderate   left hip osteoarthritis.    IMPRESSION:  1. No evidence of an active gastrointestinal hemorrhage.  2. Colitis involving the splenic flexure, descending colon and proximal   to mid sigmoid colon which is infectious, inflammatory or ischemic in   etiology.               Chief Complaint:  Patient is a 78y old  Female who presents with a chief complaint of     HPI: 78 year old with PMH significant for CADm HTN, HLD, syncope, sinus node dysfunction s/p PPM placement 2005 who initially presented with 1 day history of BRBPR. Patient states that yesterday she started having nausea, non-bloody-non-bilious vomiting, fever and chills, which made her light-headed and dizzy. Shortly thereafter, patient began having BRBPR and lower abdominal cramping. Patient states that she is continuing to pass clots and is having lower abdominal pain with each BM. Patient was recently cleared for outpatient colonoscopy as her last one was 10 years. At this time, patient denies any fever, chills, light-headedness, dizziness, chest pain, shortness of breath, nausea, vomiting, or constipation.     Allergies:  Zithromax (Short breath)  latex (Urticaria; Rash; Hives)  penicillin (Anaphylaxis)  Norvasc (Rash)      Home Medications:    Hospital Medications:  acetaminophen     Tablet .. 650 milliGRAM(s) Oral every 6 hours PRN  ciprofloxacin   IVPB      ciprofloxacin   IVPB 400 milliGRAM(s) IV Intermittent once  ciprofloxacin   IVPB 400 milliGRAM(s) IV Intermittent every 12 hours  melatonin 3 milliGRAM(s) Oral at bedtime PRN  metroNIDAZOLE  IVPB      metroNIDAZOLE  IVPB 500 milliGRAM(s) IV Intermittent once  metroNIDAZOLE  IVPB 500 milliGRAM(s) IV Intermittent every 8 hours  potassium chloride    Tablet ER 40 milliEquivalent(s) Oral once      PMHX/PSHX:  Asthma    Essential hypertension    Coronary artery disease without angina pectoris, unspecified vessel or lesion type, unspecified whether native or transplanted heart    Pacemaker    Cyst of right ovary    Presence of cardiac pacemaker    Open fracture of neck of femur    Artificial pacemaker    History of right hip replacement        Family history: ?brother recently diagnosing with pouchitis    Social History: patient denies cigarette use, alcohol use, or illicit substance use       REVIEW OF SYSTEMS:    CONSTITUTIONAL: no weakness, + fevers, + chills  EYES/ENT: No visual changes;  No vertigo or throat pain   NECK: No pain or stiffness  RESPIRATORY: No cough, wheezing, hemoptysis; No shortness of breath  CARDIOVASCULAR: No chest pain or palpitations  GASTROINTESTINAL: + lower abdominal pain. + nausea, + vomiting, no hematemesis; + diarrhea, no constipation. No melena, + hematochezia.  GENITOURINARY: No dysuria, frequency or hematuria  NEUROLOGICAL: No numbness or weakness  All other review of systems is negative unless indicated above.    PHYSICAL EXAM:     General: NAD, well groomed, well developed   Head: atraumatic, normocephalic   Eyes: pupils equal, round, reactive to light, anicteric sclera   ENMT: moist mucous membranes   Neck: supple, no JVD, no tender lymphadenopathy   Heart: regular rate and rhythm, normal s1,s2, no murmurs, rubs, or gallops   Abdomen: soft, non-distended, mildly tender to palpation along lower abdomen, no rebound tenderness or involuntary guarding, normoactive bowel sounds   Extremities: warm, well perfused, lower extremity edema   Neuro: AOX3, no focal neurological deficits     Vital Signs:  Vital Signs Last 24 Hrs  T(C): 36.5 (05 Apr 2024 08:16), Max: 37.1 (04 Apr 2024 23:15)  T(F): 97.7 (05 Apr 2024 08:16), Max: 98.8 (04 Apr 2024 23:15)  HR: 60 (05 Apr 2024 08:16) (60 - 106)  BP: 138/80 (05 Apr 2024 08:16) (131/75 - 151/95)  BP(mean): 105 (05 Apr 2024 04:16) (93 - 105)  RR: 16 (05 Apr 2024 08:16) (16 - 18)  SpO2: 97% (05 Apr 2024 08:16) (95% - 97%)    Parameters below as of 05 Apr 2024 08:16  Patient On (Oxygen Delivery Method): room air      Daily Height in cm: 170.18 (04 Apr 2024 22:21)    Daily     LABS:                        14.3   13.01 )-----------( 341      ( 05 Apr 2024 00:34 )             42.1     Mean Cell Volume: 86.8 fl (04-05-24 @ 00:34)    04-05    142  |  103  |  14  ----------------------------<  118<H>  3.3<L>   |  26  |  0.90    Ca    9.9      05 Apr 2024 00:34    TPro  7.2  /  Alb  4.6  /  TBili  1.4<H>  /  DBili  x   /  AST  17  /  ALT  19  /  AlkPhos  124<H>  04-05    LIVER FUNCTIONS - ( 05 Apr 2024 00:34 )  Alb: 4.6 g/dL / Pro: 7.2 g/dL / ALK PHOS: 124 U/L / ALT: 19 U/L / AST: 17 U/L / GGT: x             Urinalysis Basic - ( 05 Apr 2024 06:42 )    Color: Yellow / Appearance: Clear / SG: >1.030 / pH: x  Gluc: x / Ketone: Negative mg/dL  / Bili: Negative / Urobili: 0.2 mg/dL   Blood: x / Protein: Trace mg/dL / Nitrite: Negative   Leuk Esterase: Negative / RBC: 1 /HPF / WBC 3 /HPF   Sq Epi: x / Non Sq Epi: 2 /HPF / Bacteria: Negative /HPF      Amylase Serum--      Lipase serum27       Ammonia--                          14.3   13.01 )-----------( 341      ( 05 Apr 2024 00:34 )             42.1     Imaging:    < from: CT Abdomen and Pelvis w/ IV Cont (04.05.24 @ 01:46) >  CT of the Abdomen and Pelvis was performed.  Precontrast, Arterial and Delayed phases were performed.  Sagittal and coronal reformats were performed.    FINDINGS:  LOWER CHEST: Bilateral lower lobe dependent atelectasis. Cardiomegaly.   Pacemaker leads in the right atrium and right ventricle.    LIVER: Within normal limits.  BILE DUCTS: Normal caliber.  GALLBLADDER: Within normal limits.  SPLEEN: Within normal limits.  PANCREAS: Within normal limits.  ADRENALS: Within normal limits.  KIDNEYS/URETERS: Bilateral renal cysts. No hydronephrosis.    BLADDER: Minimallydistended.  REPRODUCTIVE ORGANS: Hysterectomy. No adnexal masses.    BOWEL: Circumferential submucosal edema and pericolonic inflammatory   change involving the splenic flexure, descending colon and proximal to   mid sigmoid colon compatible with a colitis. Colonic diverticulosis. No   bowel obstruction. Appendix is normal. No intraluminal extravasation of   intravenous contrast to suggest an active gastrointestinal hemorrhage.  PERITONEUM: Trace pelvic free fluid.  VESSELS: Within normal limits.  RETROPERITONEUM/LYMPH NODES: No lymphadenopathy.  ABDOMINAL WALL: Within normal limits.  BONES: Degenerative changes of the spine. Mild S-shaped thoracolumbar   scoliosis. Partially visualized total right hip arthroplasty. Moderate   left hip osteoarthritis.    IMPRESSION:  1. No evidence of an active gastrointestinal hemorrhage.  2. Colitis involving the splenic flexure, descending colon and proximal   to mid sigmoid colon which is infectious, inflammatory or ischemic in   etiology.               Chief Complaint:  Patient is a 78y old  Female who presents with a chief complaint of     HPI: 78 year old with PMH significant for CADm HTN, HLD, syncope, sinus node dysfunction s/p PPM placement 2005 who initially presented with 1 day history of BRBPR. Patient states that yesterday she started having nausea, non-bloody-non-bilious vomiting, fever and chills, which made her light-headed and dizzy. Shortly thereafter, patient began having BRBPR and lower abdominal cramping. Patient states that she is continuing to pass clots and is having lower abdominal pain with each BM. Patient was recently cleared for outpatient colonoscopy as her last one was 10 years. At this time, patient denies any fever, chills, light-headedness, dizziness, chest pain, shortness of breath, nausea, vomiting, or constipation. Reports pain is now much improved, 4/10 compared to 10/10 when it first started. Blood is also less. She reports being constipated at baseline, and was having dyschezia when pain/bleeding started.    Allergies:  Zithromax (Short breath)  latex (Urticaria; Rash; Hives)  penicillin (Anaphylaxis)  Norvasc (Rash)      Home Medications:    Hospital Medications:  acetaminophen     Tablet .. 650 milliGRAM(s) Oral every 6 hours PRN  ciprofloxacin   IVPB      ciprofloxacin   IVPB 400 milliGRAM(s) IV Intermittent once  ciprofloxacin   IVPB 400 milliGRAM(s) IV Intermittent every 12 hours  melatonin 3 milliGRAM(s) Oral at bedtime PRN  metroNIDAZOLE  IVPB      metroNIDAZOLE  IVPB 500 milliGRAM(s) IV Intermittent once  metroNIDAZOLE  IVPB 500 milliGRAM(s) IV Intermittent every 8 hours  potassium chloride    Tablet ER 40 milliEquivalent(s) Oral once      PMHX/PSHX:  Asthma    Essential hypertension    Coronary artery disease without angina pectoris, unspecified vessel or lesion type, unspecified whether native or transplanted heart    Pacemaker    Cyst of right ovary    Presence of cardiac pacemaker    Open fracture of neck of femur    Artificial pacemaker    History of right hip replacement        Family history: ?brother recently diagnosing with pouchitis    Social History: patient denies cigarette use, alcohol use, or illicit substance use       REVIEW OF SYSTEMS:    CONSTITUTIONAL: no weakness, + fevers, + chills  EYES/ENT: No visual changes;  No vertigo or throat pain   NECK: No pain or stiffness  RESPIRATORY: No cough, wheezing, hemoptysis; No shortness of breath  CARDIOVASCULAR: No chest pain or palpitations  GASTROINTESTINAL: + lower abdominal pain. + nausea, + vomiting, no hematemesis; + diarrhea, no constipation. No melena, + hematochezia.  GENITOURINARY: No dysuria, frequency or hematuria  NEUROLOGICAL: No numbness or weakness  All other review of systems is negative unless indicated above.    PHYSICAL EXAM:     General: NAD, well groomed, well developed   Head: atraumatic, normocephalic   Eyes: pupils equal, round, reactive to light, anicteric sclera   ENMT: moist mucous membranes   Neck: supple, no JVD, no tender lymphadenopathy   Heart: regular rate and rhythm, normal s1,s2, no murmurs, rubs, or gallops   Abdomen: soft, non-distended, mildly tender to palpation along lower abdomen, no rebound tenderness or involuntary guarding, normoactive bowel sounds   Extremities: warm, well perfused, lower extremity edema   Neuro: AOX3, no focal neurological deficits     Vital Signs:  Vital Signs Last 24 Hrs  T(C): 36.5 (05 Apr 2024 08:16), Max: 37.1 (04 Apr 2024 23:15)  T(F): 97.7 (05 Apr 2024 08:16), Max: 98.8 (04 Apr 2024 23:15)  HR: 60 (05 Apr 2024 08:16) (60 - 106)  BP: 138/80 (05 Apr 2024 08:16) (131/75 - 151/95)  BP(mean): 105 (05 Apr 2024 04:16) (93 - 105)  RR: 16 (05 Apr 2024 08:16) (16 - 18)  SpO2: 97% (05 Apr 2024 08:16) (95% - 97%)    Parameters below as of 05 Apr 2024 08:16  Patient On (Oxygen Delivery Method): room air      Daily Height in cm: 170.18 (04 Apr 2024 22:21)    Daily     LABS:                        14.3   13.01 )-----------( 341      ( 05 Apr 2024 00:34 )             42.1     Mean Cell Volume: 86.8 fl (04-05-24 @ 00:34)    04-05    142  |  103  |  14  ----------------------------<  118<H>  3.3<L>   |  26  |  0.90    Ca    9.9      05 Apr 2024 00:34    TPro  7.2  /  Alb  4.6  /  TBili  1.4<H>  /  DBili  x   /  AST  17  /  ALT  19  /  AlkPhos  124<H>  04-05    LIVER FUNCTIONS - ( 05 Apr 2024 00:34 )  Alb: 4.6 g/dL / Pro: 7.2 g/dL / ALK PHOS: 124 U/L / ALT: 19 U/L / AST: 17 U/L / GGT: x             Urinalysis Basic - ( 05 Apr 2024 06:42 )    Color: Yellow / Appearance: Clear / SG: >1.030 / pH: x  Gluc: x / Ketone: Negative mg/dL  / Bili: Negative / Urobili: 0.2 mg/dL   Blood: x / Protein: Trace mg/dL / Nitrite: Negative   Leuk Esterase: Negative / RBC: 1 /HPF / WBC 3 /HPF   Sq Epi: x / Non Sq Epi: 2 /HPF / Bacteria: Negative /HPF      Amylase Serum--      Lipase serum27       Ammonia--                          14.3   13.01 )-----------( 341      ( 05 Apr 2024 00:34 )             42.1     Imaging:    < from: CT Abdomen and Pelvis w/ IV Cont (04.05.24 @ 01:46) >  CT of the Abdomen and Pelvis was performed.  Precontrast, Arterial and Delayed phases were performed.  Sagittal and coronal reformats were performed.    FINDINGS:  LOWER CHEST: Bilateral lower lobe dependent atelectasis. Cardiomegaly.   Pacemaker leads in the right atrium and right ventricle.    LIVER: Within normal limits.  BILE DUCTS: Normal caliber.  GALLBLADDER: Within normal limits.  SPLEEN: Within normal limits.  PANCREAS: Within normal limits.  ADRENALS: Within normal limits.  KIDNEYS/URETERS: Bilateral renal cysts. No hydronephrosis.    BLADDER: Minimallydistended.  REPRODUCTIVE ORGANS: Hysterectomy. No adnexal masses.    BOWEL: Circumferential submucosal edema and pericolonic inflammatory   change involving the splenic flexure, descending colon and proximal to   mid sigmoid colon compatible with a colitis. Colonic diverticulosis. No   bowel obstruction. Appendix is normal. No intraluminal extravasation of   intravenous contrast to suggest an active gastrointestinal hemorrhage.  PERITONEUM: Trace pelvic free fluid.  VESSELS: Within normal limits.  RETROPERITONEUM/LYMPH NODES: No lymphadenopathy.  ABDOMINAL WALL: Within normal limits.  BONES: Degenerative changes of the spine. Mild S-shaped thoracolumbar   scoliosis. Partially visualized total right hip arthroplasty. Moderate   left hip osteoarthritis.    IMPRESSION:  1. No evidence of an active gastrointestinal hemorrhage.  2. Colitis involving the splenic flexure, descending colon and proximal   to mid sigmoid colon which is infectious, inflammatory or ischemic in   etiology.

## 2024-04-05 NOTE — H&P ADULT - PROBLEM SELECTOR PLAN 2
in the setting of poor po intake  - potassium repletion ordered  - f/u mg level, if needed supplement mg

## 2024-04-05 NOTE — ED ADULT NURSE NOTE - NS ED NURSE REPORT GIVEN TO FT
Haylie RN - ED [facility] Case RN - ED [facility], [place]

## 2024-04-05 NOTE — ED ADULT NURSE NOTE - NSFALLUNIVINTERV_ED_ALL_ED
Bed/Stretcher in lowest position, wheels locked, appropriate side rails in place/Call bell, personal items and telephone in reach/Instruct patient to call for assistance before getting out of bed/chair/stretcher/Non-slip footwear applied when patient is off stretcher/[place] to call system/Physically safe environment - no spills, clutter or unnecessary equipment/Purposeful proactive rounding/Room/bathroom lighting operational, light cord in reach

## 2024-04-06 LAB
ALBUMIN SERPL ELPH-MCNC: 3.5 G/DL — SIGNIFICANT CHANGE UP (ref 3.3–5)
ALP SERPL-CCNC: 89 U/L — SIGNIFICANT CHANGE UP (ref 40–120)
ALT FLD-CCNC: 12 U/L — SIGNIFICANT CHANGE UP (ref 10–45)
ANION GAP SERPL CALC-SCNC: 12 MMOL/L — SIGNIFICANT CHANGE UP (ref 5–17)
AST SERPL-CCNC: 13 U/L — SIGNIFICANT CHANGE UP (ref 10–40)
BILIRUB DIRECT SERPL-MCNC: 0.2 MG/DL — SIGNIFICANT CHANGE UP (ref 0–0.3)
BILIRUB INDIRECT FLD-MCNC: 1 MG/DL — SIGNIFICANT CHANGE UP (ref 0.2–1)
BILIRUB SERPL-MCNC: 1.2 MG/DL — SIGNIFICANT CHANGE UP (ref 0.2–1.2)
BUN SERPL-MCNC: 6 MG/DL — LOW (ref 7–23)
CALCIUM SERPL-MCNC: 8.8 MG/DL — SIGNIFICANT CHANGE UP (ref 8.4–10.5)
CHLORIDE SERPL-SCNC: 105 MMOL/L — SIGNIFICANT CHANGE UP (ref 96–108)
CO2 SERPL-SCNC: 24 MMOL/L — SIGNIFICANT CHANGE UP (ref 22–31)
CREAT SERPL-MCNC: 0.8 MG/DL — SIGNIFICANT CHANGE UP (ref 0.5–1.3)
CULTURE RESULTS: SIGNIFICANT CHANGE UP
EGFR: 76 ML/MIN/1.73M2 — SIGNIFICANT CHANGE UP
GLUCOSE SERPL-MCNC: 82 MG/DL — SIGNIFICANT CHANGE UP (ref 70–99)
HCT VFR BLD CALC: 32.6 % — LOW (ref 34.5–45)
HCV AB S/CO SERPL IA: 0.11 S/CO — SIGNIFICANT CHANGE UP (ref 0–0.99)
HCV AB SERPL-IMP: SIGNIFICANT CHANGE UP
HGB BLD-MCNC: 11.1 G/DL — LOW (ref 11.5–15.5)
MCHC RBC-ENTMCNC: 29.8 PG — SIGNIFICANT CHANGE UP (ref 27–34)
MCHC RBC-ENTMCNC: 34 GM/DL — SIGNIFICANT CHANGE UP (ref 32–36)
MCV RBC AUTO: 87.6 FL — SIGNIFICANT CHANGE UP (ref 80–100)
NRBC # BLD: 0 /100 WBCS — SIGNIFICANT CHANGE UP (ref 0–0)
PLATELET # BLD AUTO: 230 K/UL — SIGNIFICANT CHANGE UP (ref 150–400)
POTASSIUM SERPL-MCNC: 3.3 MMOL/L — LOW (ref 3.5–5.3)
POTASSIUM SERPL-SCNC: 3.3 MMOL/L — LOW (ref 3.5–5.3)
PROT SERPL-MCNC: 5.5 G/DL — LOW (ref 6–8.3)
RBC # BLD: 3.72 M/UL — LOW (ref 3.8–5.2)
RBC # FLD: 13.2 % — SIGNIFICANT CHANGE UP (ref 10.3–14.5)
SODIUM SERPL-SCNC: 141 MMOL/L — SIGNIFICANT CHANGE UP (ref 135–145)
SPECIMEN SOURCE: SIGNIFICANT CHANGE UP
WBC # BLD: 9.44 K/UL — SIGNIFICANT CHANGE UP (ref 3.8–10.5)
WBC # FLD AUTO: 9.44 K/UL — SIGNIFICANT CHANGE UP (ref 3.8–10.5)

## 2024-04-06 PROCEDURE — 99232 SBSQ HOSP IP/OBS MODERATE 35: CPT

## 2024-04-06 RX ORDER — POTASSIUM CHLORIDE 20 MEQ
40 PACKET (EA) ORAL ONCE
Refills: 0 | Status: COMPLETED | OUTPATIENT
Start: 2024-04-06 | End: 2024-04-06

## 2024-04-06 RX ADMIN — Medication 81 MILLIGRAM(S): at 12:29

## 2024-04-06 RX ADMIN — Medication 100 MILLIGRAM(S): at 22:03

## 2024-04-06 RX ADMIN — Medication 30 MILLIGRAM(S): at 00:13

## 2024-04-06 RX ADMIN — Medication 100 MILLIGRAM(S): at 12:29

## 2024-04-06 RX ADMIN — Medication 40 MILLIEQUIVALENT(S): at 17:35

## 2024-04-06 RX ADMIN — Medication 100 MILLIGRAM(S): at 06:39

## 2024-04-06 RX ADMIN — ATORVASTATIN CALCIUM 20 MILLIGRAM(S): 80 TABLET, FILM COATED ORAL at 22:02

## 2024-04-06 RX ADMIN — Medication 650 MILLIGRAM(S): at 00:28

## 2024-04-06 RX ADMIN — LISINOPRIL 40 MILLIGRAM(S): 2.5 TABLET ORAL at 06:50

## 2024-04-06 RX ADMIN — Medication 30 MILLIGRAM(S): at 06:39

## 2024-04-06 RX ADMIN — SODIUM CHLORIDE 100 MILLILITER(S): 9 INJECTION, SOLUTION INTRAVENOUS at 00:10

## 2024-04-06 RX ADMIN — Medication 650 MILLIGRAM(S): at 00:58

## 2024-04-06 RX ADMIN — Medication 200 MILLIGRAM(S): at 17:36

## 2024-04-06 RX ADMIN — Medication 1000 UNIT(S): at 12:28

## 2024-04-06 RX ADMIN — Medication 200 MILLIGRAM(S): at 06:50

## 2024-04-06 NOTE — PROGRESS NOTE ADULT - ASSESSMENT
78F PMH CAD on ASA, HTN, PPM p/w BRBPR and lower abd pain admitted with sepsis due to acute ischemic colitis

## 2024-04-06 NOTE — PROGRESS NOTE ADULT - SUBJECTIVE AND OBJECTIVE BOX
[facility] Division of Hospital Medicine  Mayo Almazan MD  Available via MS Teams    SUBJECTIVE / OVERNIGHT EVENTS: Patient seen and examined at bedside, resting comfortably and in no acute distress. Denies chest pain, palpitations. Denies shortness of breath or cough. Reports abdominal pain is persistent, however mildly improved from prior. ROS otherwise noncontributory.     MEDICATIONS  (STANDING):  aspirin  chewable 81 milliGRAM(s) Oral daily  atorvastatin 20 milliGRAM(s) Oral at bedtime  cholecalciferol 1000 Unit(s) Oral daily  ciprofloxacin   IVPB      ciprofloxacin   IVPB 400 milliGRAM(s) IV Intermittent every 12 hours  diltiazem    Tablet 30 milliGRAM(s) Oral every 6 hours  lisinopril 40 milliGRAM(s) Oral daily  metroNIDAZOLE  IVPB 500 milliGRAM(s) IV Intermittent every 8 hours  metroNIDAZOLE  IVPB        MEDICATIONS  (PRN):  acetaminophen     Tablet .. 650 milliGRAM(s) Oral every 6 hours PRN Temp greater or equal to 38C (100.4F), Mild Pain (1 - 3)  melatonin 3 milliGRAM(s) Oral at bedtime PRN Insomnia  ondansetron Injectable 4 milliGRAM(s) IV Push every 8 hours PRN Nausea and/or Vomiting      I&O's Summary    05 Apr 2024 07:01  -  06 Apr 2024 07:00  --------------------------------------------------------  IN: 1590 mL / OUT: 0 mL / NET: 1590 mL    PHYSICAL EXAM:  Vital Signs Last 24 Hrs  T(C): 36.6 (06 Apr 2024 04:56), Max: 36.9 (05 Apr 2024 13:06)  T(F): 97.8 (06 Apr 2024 04:56), Max: 98.4 (05 Apr 2024 13:06)  HR: 62 (06 Apr 2024 04:56) (60 - 63)  BP: 127/61 (06 Apr 2024 04:56) (125/76 - 151/73)  BP(mean): 92 (05 Apr 2024 13:06) (92 - 92)  RR: 18 (06 Apr 2024 04:56) (18 - 18)  SpO2: 95% (06 Apr 2024 04:56) (95% - 98%)    Parameters below as of 06 Apr 2024 04:56  Patient On (Oxygen Delivery Method): room air      CONSTITUTIONAL: NAD, well-groomed  EYES: PERRLA; conjunctiva and sclera clear  ENMT: Moist oral mucosa, no pharyngeal injection or exudates; normal dentition  NECK: Supple, no palpable masses; no thyromegaly  RESPIRATORY: Normal respiratory effort; lungs are clear to auscultation bilaterally  CARDIOVASCULAR: normal S1 and S2, no murmur/rub/gallop; No lower extremity edema  ABDOMEN: Mild tenderness to palpation of lower quadrants bilaterally, no guarding, no rigidity   MUSCULOSKELETAL:  no clubbing or cyanosis of digits; no joint swelling or tenderness to palpation  PSYCH: A+O to person, place, and time; affect appropriate  NEUROLOGY: CN 2-12 are intact and symmetric; no gross sensory deficits   SKIN: No rashes; no palpable lesions    LABS:                        11.1   9.44  )-----------( 230      ( 06 Apr 2024 07:12 )             32.6     04-06    141  |  105  |  6<L>  ----------------------------<  82  3.3<L>   |  24  |  0.80    Ca    8.8      06 Apr 2024 07:13  Mg     1.9     04-05    TPro  5.5<L>  /  Alb  3.5  /  TBili  1.2  /  DBili  0.2  /  AST  13  /  ALT  12  /  AlkPhos  89  04-06    Urinalysis Basic - ( 06 Apr 2024 07:13 )    Color: x / Appearance: x / SG: x / pH: x  Gluc: 82 mg/dL / Ketone: x  / Bili: x / Urobili: x   Blood: x / Protein: x / Nitrite: x   Leuk Esterase: x / RBC: x / WBC x   Sq Epi: x / Non Sq Epi: x / Bacteria: x

## 2024-04-06 NOTE — PROGRESS NOTE ADULT - PROBLEM SELECTOR PLAN 1
Likely ischemic colitis given distribution on CTAP, associated colicky pain and BRBPR.   - GI consulted, appreciate ongoing recomendations  - C/w cipro/flagyl, can switch to po on discharge  - zofran prn  - clear diet

## 2024-04-07 ENCOUNTER — TRANSCRIPTION ENCOUNTER (OUTPATIENT)
Age: 79
End: 2024-04-07

## 2024-04-07 VITALS
TEMPERATURE: 98 F | DIASTOLIC BLOOD PRESSURE: 71 MMHG | OXYGEN SATURATION: 95 % | HEART RATE: 78 BPM | SYSTOLIC BLOOD PRESSURE: 122 MMHG | RESPIRATION RATE: 18 BRPM

## 2024-04-07 DIAGNOSIS — E87.6 HYPOKALEMIA: ICD-10-CM

## 2024-04-07 LAB
ALBUMIN SERPL ELPH-MCNC: 3.7 G/DL — SIGNIFICANT CHANGE UP (ref 3.3–5)
ALP SERPL-CCNC: 89 U/L — SIGNIFICANT CHANGE UP (ref 40–120)
ALT FLD-CCNC: 10 U/L — SIGNIFICANT CHANGE UP (ref 10–45)
ANION GAP SERPL CALC-SCNC: 11 MMOL/L — SIGNIFICANT CHANGE UP (ref 5–17)
AST SERPL-CCNC: 9 U/L — LOW (ref 10–40)
BILIRUB SERPL-MCNC: 1 MG/DL — SIGNIFICANT CHANGE UP (ref 0.2–1.2)
BUN SERPL-MCNC: 6 MG/DL — LOW (ref 7–23)
CALCIUM SERPL-MCNC: 9 MG/DL — SIGNIFICANT CHANGE UP (ref 8.4–10.5)
CHLORIDE SERPL-SCNC: 105 MMOL/L — SIGNIFICANT CHANGE UP (ref 96–108)
CO2 SERPL-SCNC: 26 MMOL/L — SIGNIFICANT CHANGE UP (ref 22–31)
CREAT SERPL-MCNC: 0.79 MG/DL — SIGNIFICANT CHANGE UP (ref 0.5–1.3)
EGFR: 77 ML/MIN/1.73M2 — SIGNIFICANT CHANGE UP
GI PCR PANEL: SIGNIFICANT CHANGE UP
GLUCOSE SERPL-MCNC: 83 MG/DL — SIGNIFICANT CHANGE UP (ref 70–99)
HCT VFR BLD CALC: 33.9 % — LOW (ref 34.5–45)
HGB BLD-MCNC: 11.7 G/DL — SIGNIFICANT CHANGE UP (ref 11.5–15.5)
MAGNESIUM SERPL-MCNC: 1.8 MG/DL — SIGNIFICANT CHANGE UP (ref 1.6–2.6)
MCHC RBC-ENTMCNC: 30.2 PG — SIGNIFICANT CHANGE UP (ref 27–34)
MCHC RBC-ENTMCNC: 34.5 GM/DL — SIGNIFICANT CHANGE UP (ref 32–36)
MCV RBC AUTO: 87.4 FL — SIGNIFICANT CHANGE UP (ref 80–100)
NRBC # BLD: 0 /100 WBCS — SIGNIFICANT CHANGE UP (ref 0–0)
PLATELET # BLD AUTO: 231 K/UL — SIGNIFICANT CHANGE UP (ref 150–400)
POTASSIUM SERPL-MCNC: 3.6 MMOL/L — SIGNIFICANT CHANGE UP (ref 3.5–5.3)
POTASSIUM SERPL-SCNC: 3.6 MMOL/L — SIGNIFICANT CHANGE UP (ref 3.5–5.3)
PROT SERPL-MCNC: 5.8 G/DL — LOW (ref 6–8.3)
RBC # BLD: 3.88 M/UL — SIGNIFICANT CHANGE UP (ref 3.8–5.2)
RBC # FLD: 13.2 % — SIGNIFICANT CHANGE UP (ref 10.3–14.5)
SODIUM SERPL-SCNC: 142 MMOL/L — SIGNIFICANT CHANGE UP (ref 135–145)
WBC # BLD: 10.18 K/UL — SIGNIFICANT CHANGE UP (ref 3.8–10.5)
WBC # FLD AUTO: 10.18 K/UL — SIGNIFICANT CHANGE UP (ref 3.8–10.5)

## 2024-04-07 PROCEDURE — 83735 ASSAY OF MAGNESIUM: CPT

## 2024-04-07 PROCEDURE — 96365 THER/PROPH/DIAG IV INF INIT: CPT

## 2024-04-07 PROCEDURE — 36415 COLL VENOUS BLD VENIPUNCTURE: CPT

## 2024-04-07 PROCEDURE — 87045 FECES CULTURE AEROBIC BACT: CPT

## 2024-04-07 PROCEDURE — 86850 RBC ANTIBODY SCREEN: CPT

## 2024-04-07 PROCEDURE — 82435 ASSAY OF BLOOD CHLORIDE: CPT

## 2024-04-07 PROCEDURE — 96361 HYDRATE IV INFUSION ADD-ON: CPT

## 2024-04-07 PROCEDURE — 86901 BLOOD TYPING SEROLOGIC RH(D): CPT

## 2024-04-07 PROCEDURE — 85027 COMPLETE CBC AUTOMATED: CPT

## 2024-04-07 PROCEDURE — 82330 ASSAY OF CALCIUM: CPT

## 2024-04-07 PROCEDURE — 96366 THER/PROPH/DIAG IV INF ADDON: CPT

## 2024-04-07 PROCEDURE — 86900 BLOOD TYPING SEROLOGIC ABO: CPT

## 2024-04-07 PROCEDURE — 85025 COMPLETE CBC W/AUTO DIFF WBC: CPT

## 2024-04-07 PROCEDURE — 85014 HEMATOCRIT: CPT

## 2024-04-07 PROCEDURE — 99285 EMERGENCY DEPT VISIT HI MDM: CPT | Mod: 25

## 2024-04-07 PROCEDURE — 87507 IADNA-DNA/RNA PROBE TQ 12-25: CPT

## 2024-04-07 PROCEDURE — 87046 STOOL CULTR AEROBIC BACT EA: CPT

## 2024-04-07 PROCEDURE — 74177 CT ABD & PELVIS W/CONTRAST: CPT | Mod: MC

## 2024-04-07 PROCEDURE — 80076 HEPATIC FUNCTION PANEL: CPT

## 2024-04-07 PROCEDURE — 82803 BLOOD GASES ANY COMBINATION: CPT

## 2024-04-07 PROCEDURE — 82947 ASSAY GLUCOSE BLOOD QUANT: CPT

## 2024-04-07 PROCEDURE — 80048 BASIC METABOLIC PNL TOTAL CA: CPT

## 2024-04-07 PROCEDURE — 83605 ASSAY OF LACTIC ACID: CPT

## 2024-04-07 PROCEDURE — 99232 SBSQ HOSP IP/OBS MODERATE 35: CPT

## 2024-04-07 PROCEDURE — 87077 CULTURE AEROBIC IDENTIFY: CPT

## 2024-04-07 PROCEDURE — 84132 ASSAY OF SERUM POTASSIUM: CPT

## 2024-04-07 PROCEDURE — 96375 TX/PRO/DX INJ NEW DRUG ADDON: CPT

## 2024-04-07 PROCEDURE — 81001 URINALYSIS AUTO W/SCOPE: CPT

## 2024-04-07 PROCEDURE — 86803 HEPATITIS C AB TEST: CPT

## 2024-04-07 PROCEDURE — 83690 ASSAY OF LIPASE: CPT

## 2024-04-07 PROCEDURE — 87086 URINE CULTURE/COLONY COUNT: CPT

## 2024-04-07 PROCEDURE — 84295 ASSAY OF SERUM SODIUM: CPT

## 2024-04-07 PROCEDURE — 85018 HEMOGLOBIN: CPT

## 2024-04-07 PROCEDURE — 80053 COMPREHEN METABOLIC PANEL: CPT

## 2024-04-07 RX ORDER — CIPROFLOXACIN LACTATE 400MG/40ML
1 VIAL (ML) INTRAVENOUS
Qty: 8 | Refills: 0
Start: 2024-04-07 | End: 2024-04-10

## 2024-04-07 RX ORDER — METRONIDAZOLE 500 MG
1 TABLET ORAL
Qty: 12 | Refills: 0
Start: 2024-04-07 | End: 2024-04-10

## 2024-04-07 RX ADMIN — Medication 200 MILLIGRAM(S): at 05:13

## 2024-04-07 RX ADMIN — Medication 100 MILLIGRAM(S): at 05:13

## 2024-04-07 RX ADMIN — Medication 30 MILLIGRAM(S): at 05:13

## 2024-04-07 RX ADMIN — LISINOPRIL 40 MILLIGRAM(S): 2.5 TABLET ORAL at 05:13

## 2024-04-07 NOTE — DISCHARGE NOTE PROVIDER - HOSPITAL COURSE
HPI:  77 yo F PMH CAD on ASA, HTN, PPM p/w BRBPR. Pt reports for the past few days feeling nauseous, followed by NBNB vomiting and acute onset lower abdominal cramping night prior to admission. She noticed associated BRBPR and diarrhea with blood clots occurring every 30 minutes, now decreased to once per hour. She had associated lightheadedness with ambulation. This has never happened to her in the past. Her last scope was 5-10  years ago and reports was wnl. She denies any fevers, chills, sob. Tylenol and Zofran helped with her symptoms in the ED.    In the ED VSS  Meds received 1L NS, Tylenol 1 g iv x 2, Sodium/potassium phos, Zofran 4 mg iv (05 Apr 2024 12:19)    Hospital Course: Patient on Cipro and Flagyl IV, GI consulted: hematochezia started following nausea, vomiting and dizziness along with a history of CAD, and CT findings showing colitis involving the splenic flexure, descending colon and proximal to mid sigmoid colon, most likely etiology is ischemic colitis. Symptoms improved, diet advanced. Hypoklaemia supplemented. DC home on Po Cipro and Flagyl. Cleared for dc.       Important Medication Changes and Reason: Cipro and Flagyl x 4 days     Active or Pending Issues Requiring Follow-up: PCP in a few days     Advanced Directives:   [ x] Full code  [ ] DNR  [ ] Hospice    Discharge Diagnoses:  Colitis  Hypokalemia  CAD  HTN

## 2024-04-07 NOTE — DISCHARGE NOTE PROVIDER - NSDCMRMEDTOKEN_GEN_ALL_CORE_FT
aspirin 81 mg oral tablet: 1 tab(s) orally once a day  atorvastatin 20 mg oral tablet: 1 tab(s) orally once a day  dilTIAZem 60 mg/12 hours oral capsule, extended release: 1 cap(s) orally 2 times a day  Pataday eyedrops: 1 drop to both eyes as needed for allergy  ramipril 10 mg oral tablet: 1 tab(s) orally once a day  Vitamin D3 tablet: 1 tablet orally once a day   aspirin 81 mg oral tablet: 1 tab(s) orally once a day  atorvastatin 20 mg oral tablet: 1 tab(s) orally once a day  ciprofloxacin 500 mg oral tablet: 1 tab(s) orally 2 times a day  dilTIAZem 60 mg/12 hours oral capsule, extended release: 1 cap(s) orally 2 times a day  metroNIDAZOLE 500 mg oral tablet: 1 tab(s) orally 3 times a day  Pataday eyedrops: 1 drop to both eyes as needed for allergy  ramipril 10 mg oral tablet: 1 tab(s) orally once a day  Vitamin D3 tablet: 1 tablet orally once a day

## 2024-04-07 NOTE — DISCHARGE NOTE NURSING/CASE MANAGEMENT/SOCIAL WORK - NSDCPEFALRISK_GEN_ALL_CORE
For information on Fall & Injury Prevention, visit: [email or web address] OR  [email or web address] OR  https://www.cdc.gov/steadi/patient.html

## 2024-04-07 NOTE — DISCHARGE NOTE NURSING/CASE MANAGEMENT/SOCIAL WORK - PATIENT PORTAL LINK FT
You can access the FollowMyHealth Patient Portal offered by [facility] by registering at the following website: [email or web address]. By joining [facility]’s FollowMyHealth portal, you will also be able to view your health information using other applications (apps) compatible with our system.

## 2024-04-07 NOTE — DISCHARGE NOTE PROVIDER - CARE PROVIDER_API CALL
Roni Gutierrez [place] [id number], [place] [address]  Phone: [phone]  Fax: [phone]  Established Patient  Follow Up Time: 1-3 days

## 2024-04-07 NOTE — DISCHARGE NOTE PROVIDER - ATTENDING DISCHARGE PHYSICAL EXAMINATION:
VITAL SIGNS:  T(C): 36.5 (04-07-24 @ 04:43), Max: 36.7 (04-06-24 @ 12:30)  T(F): 97.7 (04-07-24 @ 04:43), Max: 98 (04-06-24 @ 12:30)  HR: 60 (04-07-24 @ 04:43) (53 - 61)  BP: 136/74 (04-07-24 @ 04:43) (111/72 - 136/74)  RR: 18 (04-07-24 @ 04:43) (18 - 18)  SpO2: 95% (04-07-24 @ 04:43) (93% - 97%)    PHYSICAL EXAM:  Constitutional: WDWN resting comfortably in bed; NAD  Head: NC/AT  Eyes: PERRL, EOMI, anicteric sclera  ENT: no nasal discharge; uvula midline, no oropharyngeal erythema or exudates; MMM  Neck: supple; no JVD or thyromegaly  Respiratory: CTA B/L; no W/R/R, no retractions  Cardiac: +S1/S2; RRR; no M/R/G; PMI non-displaced  Gastrointestinal: abdomen soft, NT/ND; no rebound or guarding; +BSx4  Back: spine midline, no bony tenderness or step-offs; no CVAT B/L  Extremities: WWP, no clubbing or cyanosis; no peripheral edema  Musculoskeletal: NROM x4; no joint swelling, tenderness or erythema  Vascular: 2+ radial, femoral, DP/PT pulses B/L  Dermatologic: skin warm, dry and intact; no rashes, wounds, or scars  Lymphatic: no submandibular or cervical LAD  Neurologic: AAOx3; CNII-XII grossly intact; no focal deficits  Psychiatric: affect and characteristics of appearance, verbalizations, behaviors are appropriate

## 2024-04-07 NOTE — DISCHARGE NOTE PROVIDER - NSDCFUSCHEDAPPT_GEN_ALL_CORE_FT
Mayo Dwyer  [facility] Physician [facility]  [address] [address]  Scheduled Appointment: 04/23/2024    [facility] Physician [facility]  [address] [address]  Scheduled Appointment: 05/03/2024    Roni Gutierrez  [facility] Physician [facility]  [facility] [address]  Scheduled Appointment: 06/27/2024

## 2024-04-07 NOTE — DISCHARGE NOTE PROVIDER - NSDCCPCAREPLAN_GEN_ALL_CORE_FT
PRINCIPAL DISCHARGE DIAGNOSIS  Diagnosis: Colitis  Assessment and Plan of Treatment: Take Cipro and Flagyl until completed. Follow up with PCP in a few days   Return if worsens      SECONDARY DISCHARGE DIAGNOSES  Diagnosis: Hypokalemia  Assessment and Plan of Treatment: supplemented    Diagnosis: HTN (hypertension)  Assessment and Plan of Treatment: Take meds as prescribed and follow up with PCP in a few days    Diagnosis: CAD (coronary artery disease)  Assessment and Plan of Treatment: Take meds as prescribed and follow up with PCP in a few days

## 2024-04-08 ENCOUNTER — TRANSCRIPTION ENCOUNTER (OUTPATIENT)
Age: 79
End: 2024-04-08

## 2024-04-09 LAB
CULTURE RESULTS: SIGNIFICANT CHANGE UP
SPECIMEN SOURCE: SIGNIFICANT CHANGE UP

## 2024-04-11 ENCOUNTER — NON-APPOINTMENT (OUTPATIENT)
Age: 79
End: 2024-04-11

## 2024-04-11 ENCOUNTER — TRANSCRIPTION ENCOUNTER (OUTPATIENT)
Age: 79
End: 2024-04-11

## 2024-04-18 ENCOUNTER — NON-APPOINTMENT (OUTPATIENT)
Age: 79
End: 2024-04-18

## 2024-04-22 ENCOUNTER — NON-APPOINTMENT (OUTPATIENT)
Age: 79
End: 2024-04-22

## 2024-04-23 ENCOUNTER — APPOINTMENT (OUTPATIENT)
Dept: HEART AND VASCULAR | Facility: CLINIC | Age: 79
End: 2024-04-23
Payer: COMMERCIAL

## 2024-04-23 VITALS
OXYGEN SATURATION: 95 % | DIASTOLIC BLOOD PRESSURE: 77 MMHG | HEART RATE: 79 BPM | HEIGHT: 67 IN | BODY MASS INDEX: 24.17 KG/M2 | SYSTOLIC BLOOD PRESSURE: 117 MMHG | TEMPERATURE: 98.3 F | WEIGHT: 154 LBS

## 2024-04-23 PROCEDURE — 93280 PM DEVICE PROGR EVAL DUAL: CPT

## 2024-04-26 ENCOUNTER — APPOINTMENT (OUTPATIENT)
Dept: GASTROENTEROLOGY | Facility: CLINIC | Age: 79
End: 2024-04-26
Payer: COMMERCIAL

## 2024-04-26 VITALS
RESPIRATION RATE: 16 BRPM | BODY MASS INDEX: 23.7 KG/M2 | HEIGHT: 67 IN | SYSTOLIC BLOOD PRESSURE: 114 MMHG | WEIGHT: 151 LBS | OXYGEN SATURATION: 93 % | DIASTOLIC BLOOD PRESSURE: 70 MMHG | HEART RATE: 105 BPM | TEMPERATURE: 96.5 F

## 2024-04-26 PROCEDURE — 99204 OFFICE O/P NEW MOD 45 MIN: CPT

## 2024-04-26 RX ORDER — CLOBETASOL PROPIONATE 0.5 MG/G
0.05 OINTMENT TOPICAL
Qty: 1 | Refills: 1 | Status: DISCONTINUED | COMMUNITY
Start: 2023-10-13 | End: 2024-04-26

## 2024-04-26 RX ORDER — SODIUM SULFATE, POTASSIUM SULFATE AND MAGNESIUM SULFATE 1.6; 3.13; 17.5 G/177ML; G/177ML; G/177ML
17.5-3.13-1.6 SOLUTION ORAL
Qty: 1 | Refills: 0 | Status: DISCONTINUED | COMMUNITY
Start: 2024-04-02 | End: 2024-04-26

## 2024-04-26 RX ORDER — MELOXICAM 15 MG/1
15 TABLET ORAL DAILY
Qty: 30 | Refills: 0 | Status: DISCONTINUED | COMMUNITY
Start: 2022-12-16 | End: 2024-04-26

## 2024-04-26 RX ORDER — TRIAMCINOLONE ACETONIDE 1 MG/G
0.1 OINTMENT TOPICAL
Qty: 1 | Refills: 1 | Status: DISCONTINUED | COMMUNITY
Start: 2023-10-23 | End: 2024-04-26

## 2024-04-26 RX ORDER — FLUOCINOLONE ACETONIDE 0.11 MG/ML
0.01 OIL AURICULAR (OTIC)
Qty: 1 | Refills: 1 | Status: DISCONTINUED | COMMUNITY
Start: 2023-10-13 | End: 2024-04-26

## 2024-04-26 RX ORDER — CLOBETASOL PROPIONATE 0.5 MG/ML
0.05 SOLUTION TOPICAL
Qty: 1 | Refills: 1 | Status: DISCONTINUED | COMMUNITY
Start: 2023-10-13 | End: 2024-04-26

## 2024-04-26 NOTE — HISTORY OF PRESENT ILLNESS
[de-identified] : Normal,  at least 10 years ago -- Dr. Abraham [FreeTextEntry1] : Diverticulosis, at least 10 years ago -- Dr. Abraham

## 2024-04-26 NOTE — ASSESSMENT
[FreeTextEntry1] : 77 yo female with post-prandial stomach pains, no recent screening colonoscopy was admitted in early April with presumed ischemic colitis  - Colonoscopy and EGD at [facility] - D/w pt regarding escort post-procedure - Risks of the procedure including bleeding, perforation, etc d/w the patient - Suprep split prep - Will check blood work today

## 2024-04-29 ENCOUNTER — NON-APPOINTMENT (OUTPATIENT)
Age: 79
End: 2024-04-29

## 2024-04-29 LAB
ANION GAP SERPL CALC-SCNC: 14 MMOL/L
BUN SERPL-MCNC: 15 MG/DL
CALCIUM SERPL-MCNC: 10 MG/DL
CHLORIDE SERPL-SCNC: 103 MMOL/L
CO2 SERPL-SCNC: 23 MMOL/L
CREAT SERPL-MCNC: 1.04 MG/DL
EGFR: 55 ML/MIN/1.73M2
GLUCOSE SERPL-MCNC: 90 MG/DL
HCT VFR BLD CALC: 36.3 %
HGB BLD-MCNC: 12.3 G/DL
MCHC RBC-ENTMCNC: 29.9 PG
MCHC RBC-ENTMCNC: 33.9 GM/DL
MCV RBC AUTO: 88.3 FL
PLATELET # BLD AUTO: 336 K/UL
POTASSIUM SERPL-SCNC: 4.2 MMOL/L
RBC # BLD: 4.11 M/UL
RBC # FLD: 13.5 %
SODIUM SERPL-SCNC: 141 MMOL/L
WBC # FLD AUTO: 7.05 K/UL

## 2024-05-09 NOTE — PROCEDURE
[No] : not [NSR] : normal sinus rhythm [Pacemaker] : pacemaker [VVI] : VVI [Lead Imp:  ___ohms] : lead impedance was [unfilled] ohms [Sensing Amplitude ___mv] : sensing amplitude was [unfilled] mv [___V @] : [unfilled] V [___ ms] : [unfilled] ms [None] : none [de-identified] : 15 years [de-identified] : [employer] [de-identified] : [employer] [de-identified] : [id number] [de-identified] : 11/27/2019 [de-identified] : 40130 [de-identified] : AP <1\par   <1\par  No events \par  Minute ventilation sensor disabled by signal artifact monitor.

## 2024-05-09 NOTE — HISTORY OF PRESENT ILLNESS
[FreeTextEntry1] : 77 y/o F HTN, HLD, syncope, sinus node dysfunction s/p PPM placement 2005 ([facility]- Dr. Mondragon), now s/p generator change 11/27/2019.  Noise noted on RA and RV lead, reproducible with AROM in the office.  Sensitivity decreased on RA and RV leads.  Prior remote monitoring alert for atrial lead impedance > 3000 ohms.  She is programmed DDI 40 to evaluate for pacing requirements. She continues to not require pacing.   Brief skipped beats, no sustained palpitations.   H/O syncope following PPM placement after initiation of Norvasc (2005).

## 2024-05-09 NOTE — ADDENDUM
[FreeTextEntry1] : I, Jill Ramsey, am scribing for and the presence of Dr. Dwyer the following sections: HPI, PMH,Family/social history, ROS, Physical Exam, Assessment / Plan.  I, Mayo Dwyer, personally performed the services described in the documentation, reviewed the documentation recorded by the scribe in my presence and it accurately and completely records my words and actions.

## 2024-05-09 NOTE — DISCUSSION/SUMMARY
[Pacemaker Function Normal] : normal pacemaker function [de-identified] : RTO in 6 months [FreeTextEntry1] : 77 y/o F HTN, HLD, syncope, sinus node dysfunction s/p PPM placement 2005 ([facility]- Dr. Mondragon), now s/p generator change 11/27/2019.  Noise noted on RA and RV lead, reproducible with AROM in the office.  Sensitivity decreased on RA and RV leads.  Prior remote monitoring alert for atrial lead impedance > 3000 ohms.  She is programmed DDI 40 to evaluate for pacing requirements. She continues to not require pacing. She continues to have brief noise on leads. No changes made. We will continue to monitor. RTO in 6 months.

## 2024-05-13 ENCOUNTER — APPOINTMENT (OUTPATIENT)
Dept: HEART AND VASCULAR | Facility: CLINIC | Age: 79
End: 2024-05-13
Payer: COMMERCIAL

## 2024-05-13 VITALS
OXYGEN SATURATION: 95 % | SYSTOLIC BLOOD PRESSURE: 128 MMHG | WEIGHT: 148 LBS | HEIGHT: 67 IN | DIASTOLIC BLOOD PRESSURE: 76 MMHG | BODY MASS INDEX: 23.23 KG/M2 | TEMPERATURE: 97.6 F | HEART RATE: 69 BPM

## 2024-05-13 PROCEDURE — 93000 ELECTROCARDIOGRAM COMPLETE: CPT

## 2024-05-13 PROCEDURE — 36415 COLL VENOUS BLD VENIPUNCTURE: CPT

## 2024-05-13 PROCEDURE — 99214 OFFICE O/P EST MOD 30 MIN: CPT

## 2024-05-13 RX ORDER — ASPIRIN ENTERIC COATED TABLETS 81 MG 81 MG/1
81 TABLET, DELAYED RELEASE ORAL
Refills: 0 | Status: DISCONTINUED | COMMUNITY
End: 2024-05-13

## 2024-05-13 NOTE — DISCUSSION/SUMMARY
[Patient] : the patient [___ Month(s)] : in [unfilled] month(s) [EKG obtained to assist in diagnosis and management of assessed problem(s)] : EKG obtained to assist in diagnosis and management of assessed problem(s) [FreeTextEntry1] : 79 y/o female with h/o cad, htn, hl, SND s/p ppm 2005, predm, asthma, family h/o cvd who presents for f/up today and preprocedure evaluation for egd/colo  -CTA cor 8/23: Cardiac: 1.  The calcium score is minimal at 3.6 Agatston units, which is at the 25 percentile, adjusted for age, gender and race. 2.  Minimal stenosis of the proximal LAD. 3.  Remaining segments are normal. Non-cardiac: No lung nodule.  -Echo 7/23: 1. Normal left ventricular cavity size. The left ventricular wall thickness is normal. The left ventricular systolic function is normal. 2. There is normal left ventricular diastolic function. 3. Normal right ventricular cavity size, normal right ventricular wall thickness and normal right ventricular systolic function. 4. No significant valvular disease. 5. No pericardial effusion seen. 6. Device lead is visualized in the right heart.  -continue diltiazem  -continue lipitor  -hold aspirin for now - will reassess after colonoscopy  -close monitoring blood sugar for predm  -labs 2023 reviewed, labs ordered today  -ekg ordered today - nsr, normal intervals, no st/t changes   -EP f/up for ppm  -GI f/up for egd/colo  -Cath 2005 - [facility] reportedly normal cor's  -Stress echo 2021: normal  -Echo 2021: ef 60-65%, no wma, mild ai, trace tr/pr  -vascular referral for h/o possible ischemic colitis - likely needs CTA/MRA abd/pelvis  -counseled on cvd risk factors  -f/up 6 months for htn   Overall she is low risk for low risk procedure and can proceed for egd/colo without further testing   I have spent 30 minutes reviewing labs, records, tests and discussed cad, htn, cvd risk factor management

## 2024-05-13 NOTE — HISTORY OF PRESENT ILLNESS
[FreeTextEntry1] : 79 y/o female with h/o cad, htn, hl, SND s/p ppm , predm, asthma, family h/o early cvd who presents for f/up today and preprocedure evaluation for egd/colo  last seen   admitted  w ischemic colitis  started on asa  -CTA cor : Cardiac: 1.  The calcium score is minimal at 3.6 Agatston units, which is at the 25 percentile, adjusted for age, gender and race. 2.  Minimal stenosis of the proximal LAD. 3.  Remaining segments are normal. Non-cardiac: No lung nodule.  -Echo : 1. Normal left ventricular cavity size. The left ventricular wall thickness is normal. The left ventricular systolic function is normal. 2. There is normal left ventricular diastolic function. 3. Normal right ventricular cavity size, normal right ventricular wall thickness and normal right ventricular systolic function. 4. No significant valvular disease. 5. No pericardial effusion seen. 6. Device lead is visualized in the right heart.   no cp, sob no syncope, lh, palpitations no edema, orthopnea, pnd      Cath  - H reportedly normal cor's  Stress echo : normal  Echo : ef 60-65%, no wma, mild ai, trace tr/pr    followed by EP for ppm    seen by Dr. North in past    walks for exercise  no mental health issues  low stress  vegetarian  no pregnancy complications        PMH/PSH: cad  htn  hl  snd s/p ppm   predm  cervical spine disease  ovarian cyst  breast nodule  seasonal allergies  oa  asthma  s/p lap hysterectomy/bso   s/p right hip replacement      ALL:  norvasc  amoxicilin  azithromycin  latex    MEDS: asa 81 mg qd  lipitor 10 mg qhs  diltiazem er 60 mg bid  ramipril 10 mg qd  vit D3      SH:  no tobacco  no etoh  no drugs    daughter  RN at [employer]  from [place]    FH:  mother - htn  MI 85  father - MI 60's,  60's  sister - breast cancer, MS, alive, 72  brother - alive, 79  brother - alive, 73, giant cell arteritis  Yes

## 2024-05-14 LAB
CHOLEST SERPL-MCNC: 149 MG/DL
CREAT SPEC-SCNC: 144 MG/DL
ESTIMATED AVERAGE GLUCOSE: 126 MG/DL
HBA1C MFR BLD HPLC: 6 %
HDLC SERPL-MCNC: 64 MG/DL
LDLC SERPL CALC-MCNC: 68 MG/DL
MICROALBUMIN 24H UR DL<=1MG/L-MCNC: 2.4 MG/DL
MICROALBUMIN/CREAT 24H UR-RTO: 17 MG/G
NONHDLC SERPL-MCNC: 86 MG/DL
TRIGL SERPL-MCNC: 98 MG/DL
TSH SERPL-ACNC: 1.92 UIU/ML

## 2024-05-16 LAB — APO LP(A) SERPL-MCNC: 24.5 NMOL/L

## 2024-05-20 ENCOUNTER — NON-APPOINTMENT (OUTPATIENT)
Age: 79
End: 2024-05-20

## 2024-05-21 ENCOUNTER — APPOINTMENT (OUTPATIENT)
Dept: VASCULAR SURGERY | Facility: CLINIC | Age: 79
End: 2024-05-21
Payer: COMMERCIAL

## 2024-05-21 PROCEDURE — 99204 OFFICE O/P NEW MOD 45 MIN: CPT

## 2024-05-22 VITALS
RESPIRATION RATE: 15 BRPM | HEART RATE: 72 BPM | DIASTOLIC BLOOD PRESSURE: 80 MMHG | OXYGEN SATURATION: 98 % | SYSTOLIC BLOOD PRESSURE: 136 MMHG

## 2024-05-22 NOTE — PHYSICAL EXAM
[JVD] : no jugular venous distention  [Normal Thyroid] : the thyroid was normal [Carotid Bruits] : no carotid bruits [Normal Breath Sounds] : Normal breath sounds [Respiratory Effort] : normal respiratory effort [Normal Heart Sounds] : normal heart sounds [Normal Rate and Rhythm] : normal rate and rhythm [Right Carotid Bruit] : no bruit heard over the right carotid [Left Carotid Bruit] : no bruit heard over the left carotid [2+] : left 2+ [Ankle Swelling (On Exam)] : not present [Varicose Veins Of Lower Extremities] : not present [] : not present [Abdomen Masses] : No abdominal masses [Abdomen Tenderness] : ~T ~M No abdominal tenderness [No Rash or Lesion] : No rash or lesion [Purpura] : no purpura  [Petechiae] : no petechiae [Skin Ulcer] : no ulcer [Skin Induration] : no induration [Alert] : alert [Anxious] : anxious [de-identified] : NC/AT, anicteric [de-identified] : Healthy, NAD [de-identified] : Tympanic, hyperactive BS [de-identified] : FROM throughout, strength 5/5x4

## 2024-05-22 NOTE — ASSESSMENT
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  Pt was referred by her GI to a [facility] ER and pt was evaluated w/CT a/p which demonstrated thickened descending colon and suggestion of ischemic colitis.  Pt states that her pain is diffuse throughout the abdomen and her rectal bleeding has resolved, but she has lost 15lbs in the past 2mos.  Her pain is 4-5/10 and is not related to meal size or timing, and is not improved w/any specific treatment.  Normal abd exam noted today w/hyperactive BS, no distention, abd tympanic to percussion.  [facility] CT a/p w/IV contrast demonstrates thickened descending colon but celiac a/SMA/PIA widely patent w/no evidence of stenosis/occlusion/dissection/post-stenotic dilatation.  Reassured pt that there is no evidence of ischemic colitis on CT and she should f/u w/GI in office.

## 2024-05-22 NOTE — ADDENDUM
[FreeTextEntry1] : This note was written by Tree Flowers, acting as a scribe for Dr. Brittney Murphy.  I, Dr. Brittney Murphy, have read and attest that all the information, medical decision-making, and discharge instructions within are true and accurate.  I, Dr. Brittney Murphy, personally performed the evaluation and management (E/M) services for this new patient.  That E/M includes conducting the initial examination, assessing all conditions, and establishing the plan of care.  Today, my ACP, Tree Flowers, was here to observe my evaluation and management services for this patient to be followed going forward.

## 2024-05-22 NOTE — DATA REVIEWED
[FreeTextEntry1] : [facility] CT a/p w/IV contrast demonstrates thickened descending colon but celiac a/SMA/PIA widely patent w/no evidence of stenosis/occlusion/dissection/post-stenotic dilatation.

## 2024-05-22 NOTE — HISTORY OF PRESENT ILLNESS
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  Pt was referred by her GI to a [facility] ER and pt was evaluated w/CT a/p which demonstrated thickened descending colon and suggestion of ischemic colitis.  Pt states that her pain is diffuse throughout the abdomen and her rectal bleeding has resolved, but she has lost 15lbs in the past 2mos.  Her pain is 4-5/10 and is not related to meal size or timing, and is not improved w/any specific treatment.  Last colonoscopy >10y prior, PSurghx significant for hysterectomy for treatment of ovarian cysts.

## 2024-05-29 ENCOUNTER — NON-APPOINTMENT (OUTPATIENT)
Age: 79
End: 2024-05-29

## 2024-06-04 ENCOUNTER — RESULT REVIEW (OUTPATIENT)
Age: 79
End: 2024-06-04

## 2024-06-04 ENCOUNTER — APPOINTMENT (OUTPATIENT)
Age: 79
End: 2024-06-04
Payer: COMMERCIAL

## 2024-06-04 PROCEDURE — 45385 COLONOSCOPY W/LESION REMOVAL: CPT

## 2024-06-04 PROCEDURE — 45380 COLONOSCOPY AND BIOPSY: CPT | Mod: 59

## 2024-06-04 RX ORDER — SODIUM SULFATE, POTASSIUM SULFATE AND MAGNESIUM SULFATE 1.6; 3.13; 17.5 G/177ML; G/177ML; G/177ML
17.5-3.13-1.6 SOLUTION ORAL
Qty: 1 | Refills: 0 | Status: DISCONTINUED | COMMUNITY
Start: 2024-04-26 | End: 2024-06-04

## 2024-06-12 ENCOUNTER — APPOINTMENT (OUTPATIENT)
Dept: HEART AND VASCULAR | Facility: CLINIC | Age: 79
End: 2024-06-12
Payer: COMMERCIAL

## 2024-06-12 PROCEDURE — 99442: CPT

## 2024-06-12 NOTE — DISCUSSION/SUMMARY
[Patient] : the patient [___ Month(s)] : in [unfilled] month(s) [FreeTextEntry1] : 79 y/o female with h/o cad, htn, hl, SND s/p ppm 2005, predm, asthma, family h/o cvd who presents for f/up today via telephone  -CTA cor 8/23: Cardiac: 1. The calcium score is minimal at 3.6 Agatston units, which is at the 25 percentile, adjusted for age, gender and race. 2. Minimal stenosis of the proximal LAD. 3. Remaining segments are normal. Non-cardiac: No lung nodule.  -Echo 7/23: 1. Normal left ventricular cavity size. The left ventricular wall thickness is normal. The left ventricular systolic function is normal. 2. There is normal left ventricular diastolic function. 3. Normal right ventricular cavity size, normal right ventricular wall thickness and normal right ventricular systolic function. 4. No significant valvular disease. 5. No pericardial effusion seen. 6. Device lead is visualized in the right heart.  -continue diltiazem  -continue lipitor  -continue asa - per GI OK to take  -close monitoring blood sugar for predm  -labs 2024 reviewed   -ekg 5/24 - nsr, normal intervals, no st/t changes  -EP f/up for ppm  -GI f/up   -Cath 2005 - [facility] reportedly normal cor's  -Stress echo 2021: normal  -Echo 2021: ef 60-65%, no wma, mild ai, trace tr/pr  -vascular recs - [facility] CT a/p w/IV contrast demonstrates thickened descending colon but celiac a/SMA/PIA widely patent w/no evidence of stenosis/occlusion/dissection/post-stenotic dilatation.  no e/o ischemic colitis on CT   -counseled on cvd risk factors  -f/up 6 months for htn    I have spent 15 minutes reviewing labs, records, tests and discussed cad, htn, cvd risk factor management.

## 2024-06-12 NOTE — HISTORY OF PRESENT ILLNESS
[FreeTextEntry1] : 77 y/o female with h/o cad, htn, hl, SND s/p ppm , predm, asthma, family h/o early cvd who presents for f/up today via telephone  last seen   had egd/colo -erosions in stomach c/w erosive gastritis, esophageal hiatal hernia polpys in colon, moderate diverticulosis recommend omeprazole  resumed asa   seen by vascular -vascular [facility] CT a/p w/IV contrast demonstrates thickened descending colon but celiac a/SMA/PIA widely patent w/no evidence of stenosis/occlusion/dissection/post-stenotic dilatation.  no e/o ischemic colitis on CT   admitted  w rectal bleeding     -CTA cor : Cardiac: 1. The calcium score is minimal at 3.6 Agatston units, which is at the 25 percentile, adjusted for age, gender and race. 2. Minimal stenosis of the proximal LAD. 3. Remaining segments are normal. Non-cardiac: No lung nodule.  -Echo : 1. Normal left ventricular cavity size. The left ventricular wall thickness is normal. The left ventricular systolic function is normal. 2. There is normal left ventricular diastolic function. 3. Normal right ventricular cavity size, normal right ventricular wall thickness and normal right ventricular systolic function. 4. No significant valvular disease. 5. No pericardial effusion seen. 6. Device lead is visualized in the right heart.  no cp, sob no syncope, lh, palpitations no edema, orthopnea, pnd      Cath  - H reportedly normal cor's  Stress echo : normal  Echo : ef 60-65%, no wma, mild ai, trace tr/pr    followed by EP for ppm    seen by Dr. North in past    walks for exercise  no mental health issues  low stress  vegetarian  no pregnancy complications        PMH/PSH: cad  htn  hl  snd s/p ppm   predm  cervical spine disease  ovarian cyst  breast nodule  seasonal allergies  oa  asthma  s/p lap hysterectomy/bso 2016  s/p right hip replacement      ALL:  norvasc  amoxicilin  azithromycin  latex    MEDS: asa 81 mg qd  lipitor 10 mg qhs  diltiazem er 60 mg bid  ramipril 10 mg qd  vit D3      SH:  no tobacco  no etoh  no drugs    daughter  RN at [employer]  from [place]    FH:  mother - htn  MI 85  father - MI 60's,  60's  sister - breast cancer, MS, alive, 72  brother - alive, 79  brother - alive, 73, giant cell arteritis

## 2024-06-27 ENCOUNTER — APPOINTMENT (OUTPATIENT)
Dept: FAMILY MEDICINE | Facility: CLINIC | Age: 79
End: 2024-06-27
Payer: COMMERCIAL

## 2024-06-27 ENCOUNTER — NON-APPOINTMENT (OUTPATIENT)
Age: 79
End: 2024-06-27

## 2024-06-27 VITALS
TEMPERATURE: 98 F | OXYGEN SATURATION: 95 % | DIASTOLIC BLOOD PRESSURE: 73 MMHG | BODY MASS INDEX: 22.96 KG/M2 | SYSTOLIC BLOOD PRESSURE: 120 MMHG | HEIGHT: 67.5 IN | WEIGHT: 148 LBS | HEART RATE: 74 BPM

## 2024-06-27 DIAGNOSIS — Z12.11 ENCOUNTER FOR SCREENING FOR MALIGNANT NEOPLASM OF COLON: ICD-10-CM

## 2024-06-27 DIAGNOSIS — K52.3 INDETERMINATE COLITIS: ICD-10-CM

## 2024-06-27 DIAGNOSIS — M50.90 CERVICAL DISC DISORDER, UNSPECIFIED, UNSPECIFIED CERVICAL REGION: ICD-10-CM

## 2024-06-27 DIAGNOSIS — I10 ESSENTIAL (PRIMARY) HYPERTENSION: ICD-10-CM

## 2024-06-27 DIAGNOSIS — Z87.2 PERSONAL HISTORY OF DISEASES OF THE SKIN AND SUBCUTANEOUS TISSUE: ICD-10-CM

## 2024-06-27 DIAGNOSIS — L30.9 DERMATITIS, UNSPECIFIED: ICD-10-CM

## 2024-06-27 DIAGNOSIS — M54.50 LOW BACK PAIN, UNSPECIFIED: ICD-10-CM

## 2024-06-27 DIAGNOSIS — K62.5 HEMORRHAGE OF ANUS AND RECTUM: ICD-10-CM

## 2024-06-27 DIAGNOSIS — Z01.818 ENCOUNTER FOR OTHER PREPROCEDURAL EXAMINATION: ICD-10-CM

## 2024-06-27 DIAGNOSIS — M70.62 TROCHANTERIC BURSITIS, LEFT HIP: ICD-10-CM

## 2024-06-27 DIAGNOSIS — M54.12 RADICULOPATHY, CERVICAL REGION: ICD-10-CM

## 2024-06-27 DIAGNOSIS — R73.03 PREDIABETES.: ICD-10-CM

## 2024-06-27 DIAGNOSIS — Z86.39 PERSONAL HISTORY OF OTHER ENDOCRINE, NUTRITIONAL AND METABOLIC DISEASE: ICD-10-CM

## 2024-06-27 DIAGNOSIS — Z23 ENCOUNTER FOR IMMUNIZATION: ICD-10-CM

## 2024-06-27 DIAGNOSIS — I49.5 SICK SINUS SYNDROME: ICD-10-CM

## 2024-06-27 DIAGNOSIS — N83.201 UNSPECIFIED OVARIAN CYST, RIGHT SIDE: ICD-10-CM

## 2024-06-27 DIAGNOSIS — I25.10 ATHEROSCLEROTIC HEART DISEASE OF NATIVE CORONARY ARTERY W/OUT ANGINA PECTORIS: ICD-10-CM

## 2024-06-27 DIAGNOSIS — Z87.39 PERSONAL HISTORY OF OTHER DISEASES OF THE MUSCULOSKELETAL SYSTEM AND CONNECTIVE TISSUE: ICD-10-CM

## 2024-06-27 DIAGNOSIS — Z86.018 PERSONAL HISTORY OF OTHER BENIGN NEOPLASM: ICD-10-CM

## 2024-06-27 DIAGNOSIS — Z12.39 ENCOUNTER FOR OTHER SCREENING FOR MALIGNANT NEOPLASM OF BREAST: ICD-10-CM

## 2024-06-27 DIAGNOSIS — M25.531 PAIN IN RIGHT WRIST: ICD-10-CM

## 2024-06-27 DIAGNOSIS — N63.10 UNSPECIFIED LUMP IN THE RIGHT BREAST, UNSPECIFIED QUADRANT: ICD-10-CM

## 2024-06-27 DIAGNOSIS — R97.0 ELEVATED CARCINOEMBRYONIC ANTIGEN [CEA]: ICD-10-CM

## 2024-06-27 DIAGNOSIS — M79.18 MYALGIA, OTHER SITE: ICD-10-CM

## 2024-06-27 DIAGNOSIS — K29.50 UNSPECIFIED CHRONIC GASTRITIS W/OUT BLEEDING: ICD-10-CM

## 2024-06-27 DIAGNOSIS — M50.00 CERVICAL DISC DISORDER WITH MYELOPATHY, UNSPECIFIED CERVICAL REGION: ICD-10-CM

## 2024-06-27 DIAGNOSIS — Z87.19 PERSONAL HISTORY OF OTHER DISEASES OF THE DIGESTIVE SYSTEM: ICD-10-CM

## 2024-06-27 DIAGNOSIS — M25.532 PAIN IN LEFT WRIST: ICD-10-CM

## 2024-06-27 DIAGNOSIS — N63.0 UNSPECIFIED LUMP IN UNSPECIFIED BREAST: ICD-10-CM

## 2024-06-27 DIAGNOSIS — R06.09 OTHER FORMS OF DYSPNEA: ICD-10-CM

## 2024-06-27 DIAGNOSIS — M79.642 PAIN IN LEFT HAND: ICD-10-CM

## 2024-06-27 DIAGNOSIS — R20.2 PARESTHESIA OF SKIN: ICD-10-CM

## 2024-06-27 DIAGNOSIS — Z87.898 PERSONAL HISTORY OF OTHER SPECIFIED CONDITIONS: ICD-10-CM

## 2024-06-27 DIAGNOSIS — Z86.03 PERSONAL HISTORY OF NEOPLASM OF UNCERTAIN BEHAVIOR: ICD-10-CM

## 2024-06-27 DIAGNOSIS — K21.9 GASTRO-ESOPHAGEAL REFLUX DISEASE W/OUT ESOPHAGITIS: ICD-10-CM

## 2024-06-27 DIAGNOSIS — Z12.83 ENCOUNTER FOR SCREENING FOR MALIGNANT NEOPLASM OF SKIN: ICD-10-CM

## 2024-06-27 DIAGNOSIS — M48.02 SPINAL STENOSIS, CERVICAL REGION: ICD-10-CM

## 2024-06-27 DIAGNOSIS — Z00.00 ENCOUNTER FOR GENERAL ADULT MEDICAL EXAMINATION W/OUT ABNORMAL FINDINGS: ICD-10-CM

## 2024-06-27 DIAGNOSIS — L85.3 XEROSIS CUTIS: ICD-10-CM

## 2024-06-27 DIAGNOSIS — J30.2 OTHER SEASONAL ALLERGIC RHINITIS: ICD-10-CM

## 2024-06-27 DIAGNOSIS — T82.110A BREAKDOWN (MECHANICAL) OF CARDIAC ELECTRODE, INITIAL ENCOUNTER: ICD-10-CM

## 2024-06-27 DIAGNOSIS — Z95.0 PRESENCE OF CARDIAC PACEMAKER: ICD-10-CM

## 2024-06-27 DIAGNOSIS — R10.9 UNSPECIFIED ABDOMINAL PAIN: ICD-10-CM

## 2024-06-27 PROCEDURE — 99387 INIT PM E/M NEW PAT 65+ YRS: CPT

## 2024-07-15 ENCOUNTER — APPOINTMENT (OUTPATIENT)
Dept: INTERNAL MEDICINE | Facility: CLINIC | Age: 79
End: 2024-07-15
Payer: COMMERCIAL

## 2024-07-15 DIAGNOSIS — Z23 ENCOUNTER FOR IMMUNIZATION: ICD-10-CM

## 2024-07-15 PROCEDURE — 90471 IMMUNIZATION ADMIN: CPT

## 2024-07-15 PROCEDURE — 90750 HZV VACC RECOMBINANT IM: CPT

## 2024-07-23 ENCOUNTER — APPOINTMENT (OUTPATIENT)
Dept: HEART AND VASCULAR | Facility: CLINIC | Age: 79
End: 2024-07-23
Payer: COMMERCIAL

## 2024-07-23 ENCOUNTER — NON-APPOINTMENT (OUTPATIENT)
Age: 79
End: 2024-07-23

## 2024-07-23 PROCEDURE — 93296 REM INTERROG EVL PM/IDS: CPT

## 2024-07-23 PROCEDURE — 93294 REM INTERROG EVL PM/LDLS PM: CPT

## 2024-07-31 ENCOUNTER — APPOINTMENT (OUTPATIENT)
Dept: PULMONOLOGY | Facility: CLINIC | Age: 79
End: 2024-07-31
Payer: COMMERCIAL

## 2024-07-31 VITALS
DIASTOLIC BLOOD PRESSURE: 60 MMHG | HEIGHT: 67.5 IN | WEIGHT: 148 LBS | SYSTOLIC BLOOD PRESSURE: 130 MMHG | TEMPERATURE: 98 F | BODY MASS INDEX: 22.96 KG/M2 | HEART RATE: 74 BPM | OXYGEN SATURATION: 96 %

## 2024-07-31 DIAGNOSIS — J45.20 MILD INTERMITTENT ASTHMA, UNCOMPLICATED: ICD-10-CM

## 2024-07-31 PROCEDURE — 94010 BREATHING CAPACITY TEST: CPT

## 2024-07-31 PROCEDURE — 99204 OFFICE O/P NEW MOD 45 MIN: CPT | Mod: 25

## 2024-07-31 PROCEDURE — 95012 NITRIC OXIDE EXP GAS DETER: CPT

## 2024-07-31 RX ORDER — ALBUTEROL SULFATE AND BUDESONIDE 90; 80 UG/1; UG/1
90-80 AEROSOL, METERED RESPIRATORY (INHALATION)
Qty: 1 | Refills: 5 | Status: ACTIVE | COMMUNITY
Start: 2024-07-31 | End: 1900-01-01

## 2024-07-31 NOTE — HISTORY OF PRESENT ILLNESS
[Never] : never [TextBox_4] : 07/31/2024: Asked to evaluate patient by Dr Matt CONTI. 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. She has adult onset asthma triggered by latex allergy. [place], had [place] pulm [place], and has seen Les here when she had a total hip revision. Has PPM, sees Dr Ervin. A few weeks ago had sneezing, coughing, sputum production, green, thick, hard to cough up. Just waited it out, improved. Sometimes dyspneic. Right now she has no asthma meds at all. Doesn't need albuterol often, maybe monthly. No nocturnal awakenings. Never admitted for asthma but has been to ED. Last steroids years ago. Today she feels dyspneic. Has a dog at home, doesn't think she's allergic. But overall feels allergic to many thinks and sensitive to scents.

## 2024-07-31 NOTE — ASSESSMENT
[FreeTextEntry1] : Data reviewed:  [place] 07/31/2024 : normal, ratio 72%, FEV1 76% / FENO 30  Impression: Intermittent asthma  Plan: Airsupra prn, or as covered (Symbicort, Dulera). Follow at least annually and sooner as needed.

## 2024-09-17 ENCOUNTER — NON-APPOINTMENT (OUTPATIENT)
Age: 79
End: 2024-09-17

## 2024-09-17 ENCOUNTER — APPOINTMENT (OUTPATIENT)
Dept: INTERNAL MEDICINE | Facility: CLINIC | Age: 79
End: 2024-09-17
Payer: COMMERCIAL

## 2024-09-17 ENCOUNTER — APPOINTMENT (OUTPATIENT)
Dept: OPHTHALMOLOGY | Facility: CLINIC | Age: 79
End: 2024-09-17
Payer: COMMERCIAL

## 2024-09-17 DIAGNOSIS — Z23 ENCOUNTER FOR IMMUNIZATION: ICD-10-CM

## 2024-09-17 PROCEDURE — 92134 CPTRZ OPH DX IMG PST SGM RTA: CPT

## 2024-09-17 PROCEDURE — 92014 COMPRE OPH EXAM EST PT 1/>: CPT

## 2024-09-17 PROCEDURE — 90471 IMMUNIZATION ADMIN: CPT

## 2024-09-17 PROCEDURE — 90750 HZV VACC RECOMBINANT IM: CPT

## 2024-09-26 ENCOUNTER — EMERGENCY (EMERGENCY)
Facility: HOSPITAL | Age: 79
LOS: 1 days | Discharge: HOME | End: 2024-09-26
Attending: EMERGENCY MEDICINE | Admitting: EMERGENCY MEDICINE
Payer: COMMERCIAL

## 2024-09-26 VITALS
DIASTOLIC BLOOD PRESSURE: 107 MMHG | HEIGHT: 68 IN | SYSTOLIC BLOOD PRESSURE: 185 MMHG | OXYGEN SATURATION: 98 % | WEIGHT: 147.93 LBS | HEART RATE: 78 BPM | TEMPERATURE: 98 F | RESPIRATION RATE: 17 BRPM

## 2024-09-26 VITALS
OXYGEN SATURATION: 98 % | RESPIRATION RATE: 18 BRPM | DIASTOLIC BLOOD PRESSURE: 73 MMHG | HEART RATE: 80 BPM | SYSTOLIC BLOOD PRESSURE: 134 MMHG

## 2024-09-26 DIAGNOSIS — S90.32XA CONTUSION OF LEFT FOOT, INITIAL ENCOUNTER: ICD-10-CM

## 2024-09-26 DIAGNOSIS — I25.10 ATHEROSCLEROTIC HEART DISEASE OF NATIVE CORONARY ARTERY WITHOUT ANGINA PECTORIS: ICD-10-CM

## 2024-09-26 DIAGNOSIS — M54.12 RADICULOPATHY, CERVICAL REGION: ICD-10-CM

## 2024-09-26 DIAGNOSIS — Z96.641 PRESENCE OF RIGHT ARTIFICIAL HIP JOINT: Chronic | ICD-10-CM

## 2024-09-26 DIAGNOSIS — Z95.0 PRESENCE OF CARDIAC PACEMAKER: Chronic | ICD-10-CM

## 2024-09-26 DIAGNOSIS — S09.90XA UNSPECIFIED INJURY OF HEAD, INITIAL ENCOUNTER: ICD-10-CM

## 2024-09-26 DIAGNOSIS — Z88.0 ALLERGY STATUS TO PENICILLIN: ICD-10-CM

## 2024-09-26 DIAGNOSIS — Z79.82 LONG TERM (CURRENT) USE OF ASPIRIN: ICD-10-CM

## 2024-09-26 DIAGNOSIS — Z88.1 ALLERGY STATUS TO OTHER ANTIBIOTIC AGENTS: ICD-10-CM

## 2024-09-26 DIAGNOSIS — I67.89 OTHER CEREBROVASCULAR DISEASE: ICD-10-CM

## 2024-09-26 DIAGNOSIS — M54.2 CERVICALGIA: ICD-10-CM
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
## 2024-09-26 DIAGNOSIS — Z90.710 ACQUIRED ABSENCE OF BOTH CERVIX AND UTERUS: Chronic | ICD-10-CM

## 2024-09-26 DIAGNOSIS — Z88.8 ALLERGY STATUS TO OTHER DRUGS, MEDICAMENTS AND BIOLOGICAL SUBSTANCES: ICD-10-CM
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
## 2024-09-26 LAB
ANION GAP SERPL CALC-SCNC: 11 MMOL/L — SIGNIFICANT CHANGE UP (ref 5–17)
BASOPHILS # BLD AUTO: 0.03 K/UL — SIGNIFICANT CHANGE UP (ref 0–0.2)
BASOPHILS NFR BLD AUTO: 0.4 % — SIGNIFICANT CHANGE UP (ref 0–2)
BUN SERPL-MCNC: 18 MG/DL — SIGNIFICANT CHANGE UP (ref 7–23)
CALCIUM SERPL-MCNC: 9.5 MG/DL — SIGNIFICANT CHANGE UP (ref 8.4–10.5)
CHLORIDE SERPL-SCNC: 101 MMOL/L — SIGNIFICANT CHANGE UP (ref 96–108)
CO2 SERPL-SCNC: 26 MMOL/L — SIGNIFICANT CHANGE UP (ref 22–31)
CREAT SERPL-MCNC: 0.78 MG/DL — SIGNIFICANT CHANGE UP (ref 0.5–1.3)
EGFR: 78 ML/MIN/1.73M2 — SIGNIFICANT CHANGE UP
EGFR: 78 ML/MIN/1.73M2 — SIGNIFICANT CHANGE UP
EOSINOPHIL # BLD AUTO: 0.09 K/UL — SIGNIFICANT CHANGE UP (ref 0–0.5)
EOSINOPHIL NFR BLD AUTO: 1.1 % — SIGNIFICANT CHANGE UP (ref 0–6)
GLUCOSE SERPL-MCNC: 114 MG/DL — HIGH (ref 70–99)
HCT VFR BLD CALC: 39.3 % — SIGNIFICANT CHANGE UP (ref 34.5–45)
HGB BLD-MCNC: 12.9 G/DL — SIGNIFICANT CHANGE UP (ref 11.5–15.5)
IMM GRANULOCYTES NFR BLD AUTO: 0.4 % — SIGNIFICANT CHANGE UP (ref 0–0.9)
LYMPHOCYTES # BLD AUTO: 1.35 K/UL — SIGNIFICANT CHANGE UP (ref 1–3.3)
LYMPHOCYTES # BLD AUTO: 15.8 % — SIGNIFICANT CHANGE UP (ref 13–44)
MCHC RBC-ENTMCNC: 29.1 PG — SIGNIFICANT CHANGE UP (ref 27–34)
MCHC RBC-ENTMCNC: 32.8 GM/DL — SIGNIFICANT CHANGE UP (ref 32–36)
MCV RBC AUTO: 88.5 FL — SIGNIFICANT CHANGE UP (ref 80–100)
MONOCYTES # BLD AUTO: 0.47 K/UL — SIGNIFICANT CHANGE UP (ref 0–0.9)
MONOCYTES NFR BLD AUTO: 5.5 % — SIGNIFICANT CHANGE UP (ref 2–14)
NEUTROPHILS # BLD AUTO: 6.6 K/UL — SIGNIFICANT CHANGE UP (ref 1.8–7.4)
NEUTROPHILS NFR BLD AUTO: 76.8 % — SIGNIFICANT CHANGE UP (ref 43–77)
NRBC # BLD: 0 /100 WBCS — SIGNIFICANT CHANGE UP (ref 0–0)
NRBC BLD-RTO: 0 /100 WBCS — SIGNIFICANT CHANGE UP (ref 0–0)
PLATELET # BLD AUTO: 316 K/UL — SIGNIFICANT CHANGE UP (ref 150–400)
POTASSIUM SERPL-MCNC: 4.4 MMOL/L — SIGNIFICANT CHANGE UP (ref 3.5–5.3)
POTASSIUM SERPL-SCNC: 4.4 MMOL/L — SIGNIFICANT CHANGE UP (ref 3.5–5.3)
RBC # BLD: 4.44 M/UL — SIGNIFICANT CHANGE UP (ref 3.8–5.2)
RBC # FLD: 12.5 % — SIGNIFICANT CHANGE UP (ref 10.3–14.5)
SODIUM SERPL-SCNC: 138 MMOL/L — SIGNIFICANT CHANGE UP (ref 135–145)
WBC # BLD: 8.57 K/UL — SIGNIFICANT CHANGE UP (ref 3.8–10.5)
WBC # FLD AUTO: 8.57 K/UL — SIGNIFICANT CHANGE UP (ref 3.8–10.5)

## 2024-09-26 PROCEDURE — 72125 CT NECK SPINE W/O DYE: CPT | Mod: MC

## 2024-09-26 PROCEDURE — 70450 CT HEAD/BRAIN W/O DYE: CPT | Mod: MC

## 2024-09-26 PROCEDURE — 96375 TX/PRO/DX INJ NEW DRUG ADDON: CPT

## 2024-09-26 PROCEDURE — 82962 GLUCOSE BLOOD TEST: CPT

## 2024-09-26 PROCEDURE — 70450 CT HEAD/BRAIN W/O DYE: CPT | Mod: 26,MC

## 2024-09-26 PROCEDURE — 72125 CT NECK SPINE W/O DYE: CPT | Mod: 26,MC

## 2024-09-26 PROCEDURE — 99285 EMERGENCY DEPT VISIT HI MDM: CPT

## 2024-09-26 PROCEDURE — 36415 COLL VENOUS BLD VENIPUNCTURE: CPT

## 2024-09-26 PROCEDURE — 96374 THER/PROPH/DIAG INJ IV PUSH: CPT

## 2024-09-26 PROCEDURE — 73630 X-RAY EXAM OF FOOT: CPT | Mod: 26,LT

## 2024-09-26 PROCEDURE — 73630 X-RAY EXAM OF FOOT: CPT

## 2024-09-26 PROCEDURE — 85025 COMPLETE CBC W/AUTO DIFF WBC: CPT

## 2024-09-26 PROCEDURE — 80048 BASIC METABOLIC PNL TOTAL CA: CPT

## 2024-09-26 PROCEDURE — 99284 EMERGENCY DEPT VISIT MOD MDM: CPT | Mod: 25

## 2024-09-26 RX ORDER — IBUPROFEN 200 MG
600 TABLET ORAL ONCE
Refills: 0 | Status: COMPLETED | OUTPATIENT
Start: 2024-09-26 | End: 2024-09-26

## 2024-09-26 RX ORDER — OXYCODONE HYDROCHLORIDE 30 MG/1
1 TABLET ORAL
Qty: 15 | Refills: 0
Start: 2024-09-26 | End: 2024-09-30

## 2024-09-26 RX ORDER — ACETAMINOPHEN 500 MG/5ML
1000 LIQUID (ML) ORAL ONCE
Refills: 0 | Status: COMPLETED | OUTPATIENT
Start: 2024-09-26 | End: 2024-09-26

## 2024-09-26 RX ORDER — ONDANSETRON HCL/PF 4 MG/2 ML
4 VIAL (ML) INJECTION ONCE
Refills: 0 | Status: COMPLETED | OUTPATIENT
Start: 2024-09-26 | End: 2024-09-26

## 2024-09-26 RX ADMIN — Medication 4 MILLIGRAM(S): at 12:54

## 2024-09-26 RX ADMIN — Medication 4 MILLIGRAM(S): at 12:09

## 2024-09-26 RX ADMIN — Medication 4 MILLIGRAM(S): at 13:15

## 2024-09-26 RX ADMIN — Medication 1000 MILLIGRAM(S): at 12:30

## 2024-09-26 RX ADMIN — Medication 600 MILLIGRAM(S): at 14:53

## 2024-09-26 RX ADMIN — Medication 400 MILLIGRAM(S): at 12:10

## 2024-09-30 ENCOUNTER — APPOINTMENT (OUTPATIENT)
Dept: ORTHOPEDIC SURGERY | Facility: CLINIC | Age: 79
End: 2024-09-30
Payer: COMMERCIAL

## 2024-09-30 DIAGNOSIS — S12.600A UNSPECIFIED DISPLACED FRACTURE OF SEVENTH CERVICAL VERTEBRA, INITIAL ENCOUNTER FOR CLOSED FRACTURE: ICD-10-CM

## 2024-09-30 DIAGNOSIS — M48.02 SPINAL STENOSIS, CERVICAL REGION: ICD-10-CM

## 2024-09-30 PROCEDURE — 72050 X-RAY EXAM NECK SPINE 4/5VWS: CPT

## 2024-09-30 PROCEDURE — 99204 OFFICE O/P NEW MOD 45 MIN: CPT

## 2024-09-30 RX ORDER — METHYLPREDNISOLONE 4 MG/1
4 TABLET ORAL
Qty: 1 | Refills: 0 | Status: ACTIVE | COMMUNITY
Start: 2024-09-30 | End: 1900-01-01

## 2024-09-30 RX ORDER — GABAPENTIN 300 MG/1
300 CAPSULE ORAL TWICE DAILY
Qty: 60 | Refills: 1 | Status: ACTIVE | COMMUNITY
Start: 2024-09-30 | End: 1900-01-01

## 2024-10-01 NOTE — PHYSICAL EXAM
[de-identified] : Physical Exam:  General: patient is well developed, well nourished, in no acute  distress, alert and oriented x 3.   Mood and affect: normal  Respiratory: no respiratory distress noted  Alignment: The spine is well compensated in the coronal and sagittal plane.   Gait: The patient is able to toe walk and heel walk without difficulty.   Range of motion: Cervical spine ROM is deferred  Neurologic Exam: Motor: Manual Muscle testing in the upper and lower extremities is 5 out of 5 in all muscle groups. There is no evidence of muscular atrophy in the upper extremities. Sensory: Sensation to light touch is grossly intact in the upper and lower extremities  Reflexes: DTR are present and symmetric throughout, negative starr bilaterally, negative inverted radial reflex bilaterally, no clonus, plantar responses are flexor  Special tests: Spurlings sign absent. Lhermitte's sign is absent. .   [de-identified] : XR cervical 4 view 9/30/24 (my read): multilevel moderate/severe degenerative changes, no dynamic instability  CT cervical spine without contrast 9/26/24 (my read): right C7 superior articulating process fracture, adjacent to right C7 nerve root  CT cervical 9/2020: multilevel disc osteophytes and facet arthropathy; severe foraminal stenosis bilaterally C4/C5, left C3/C4, no acute fractures  XR cervical 9/23/20: multilevel degenerative changes, no dynamic instability, no acute fractures  XR pelvis 3/3/20: s/p right RUBIN, hardware intact, no evidence of fracture or dislocation  XR lumbar 3/3/20: multilevel degenerative changes, no spondylolisthesis, no fractures seen    XR cervical 8/15/18: moderate multilevel degenerative changes, no spondylolisthesis, no acute fractures  CT 8/8/18: Severe bilateral C4/5 foraminal stenosis, with osseous fragment within the left neural foramen, moderate-severe multilevel bilateral foraminal stenosis; no central canal stenosis; no large disc herniations, no spondylolisthesis.  CT myelogram 9/2018: severe left and mod to severe right C4/5 foraminal stenosis; C5/6 moderate central stenosis without signicant cord compression. Bilateral C5/6 and C6/7 foraminal stenosis. Extensive disc degeneration.

## 2024-10-01 NOTE — HISTORY OF PRESENT ILLNESS
[de-identified] : 9/30/24: Patient returns to office, last seen in 2021. She reports a severe exacerbation of her chronic neck pain and right upper extremity radiating pain, onset after a fall 2 weeks ago. Pain has not improved. She was seen in the [facility] ED 9/26 and a CT cervical was performed.

## 2024-10-01 NOTE — HISTORY OF PRESENT ILLNESS
[de-identified] : 9/30/24: Patient returns to office, last seen in 2021. She reports a severe exacerbation of her chronic neck pain and right upper extremity radiating pain, onset after a fall 2 weeks ago. Pain has not improved. She was seen in the [facility] ED 9/26 and a CT cervical was performed.

## 2024-10-01 NOTE — PHYSICAL EXAM
[de-identified] : Physical Exam:  General: patient is well developed, well nourished, in no acute  distress, alert and oriented x 3.   Mood and affect: normal  Respiratory: no respiratory distress noted  Alignment: The spine is well compensated in the coronal and sagittal plane.   Gait: The patient is able to toe walk and heel walk without difficulty.   Range of motion: Cervical spine ROM is deferred  Neurologic Exam: Motor: Manual Muscle testing in the upper and lower extremities is 5 out of 5 in all muscle groups. There is no evidence of muscular atrophy in the upper extremities. Sensory: Sensation to light touch is grossly intact in the upper and lower extremities  Reflexes: DTR are present and symmetric throughout, negative starr bilaterally, negative inverted radial reflex bilaterally, no clonus, plantar responses are flexor  Special tests: Spurlings sign absent. Lhermitte's sign is absent. .   [de-identified] : XR cervical 4 view 9/30/24 (my read): multilevel moderate/severe degenerative changes, no dynamic instability  CT cervical spine without contrast 9/26/24 (my read): right C7 superior articulating process fracture, adjacent to right C7 nerve root  CT cervical 9/2020: multilevel disc osteophytes and facet arthropathy; severe foraminal stenosis bilaterally C4/C5, left C3/C4, no acute fractures  XR cervical 9/23/20: multilevel degenerative changes, no dynamic instability, no acute fractures  XR pelvis 3/3/20: s/p right RUBIN, hardware intact, no evidence of fracture or dislocation  XR lumbar 3/3/20: multilevel degenerative changes, no spondylolisthesis, no fractures seen    XR cervical 8/15/18: moderate multilevel degenerative changes, no spondylolisthesis, no acute fractures  CT 8/8/18: Severe bilateral C4/5 foraminal stenosis, with osseous fragment within the left neural foramen, moderate-severe multilevel bilateral foraminal stenosis; no central canal stenosis; no large disc herniations, no spondylolisthesis.  CT myelogram 9/2018: severe left and mod to severe right C4/5 foraminal stenosis; C5/6 moderate central stenosis without signicant cord compression. Bilateral C5/6 and C6/7 foraminal stenosis. Extensive disc degeneration.

## 2024-10-01 NOTE — DISCUSSION/SUMMARY
[de-identified] : Discussed my findings with the patient. Imaging reviewed, which shows a right C7 SAP fracture adjacent to the right C7 nerve root, which corresponds to her severe neck and right upper extremity pain. Treatment options discussed. Prescriptions and instructions for a medrol dose pack and gabapentin given to the patient. She was also given a referral for a hard cervical collar. She will follow up with me in 2 weeks, sooner if there is an issue. All questions answered.

## 2024-10-01 NOTE — DISCUSSION/SUMMARY
[de-identified] : Discussed my findings with the patient. Imaging reviewed, which shows a right C7 SAP fracture adjacent to the right C7 nerve root, which corresponds to her severe neck and right upper extremity pain. Treatment options discussed. Prescriptions and instructions for a medrol dose pack and gabapentin given to the patient. She was also given a referral for a hard cervical collar. She will follow up with me in 2 weeks, sooner if there is an issue. All questions answered.

## 2024-10-01 NOTE — HISTORY OF PRESENT ILLNESS
[de-identified] : 9/30/24: Patient returns to office, last seen in 2021. She reports a severe exacerbation of her chronic neck pain and right upper extremity radiating pain, onset after a fall 2 weeks ago. Pain has not improved. She was seen in the [facility] ED 9/26 and a CT cervical was performed.

## 2024-10-01 NOTE — END OF VISIT
[FreeTextEntry3] :   This note was written by Madalyn Granger PA-C, acting as a scribe for Dr. Mati Hurley. I, Dr. Mati Hurley, have read and attest that all the information, medical decision-making, and discharge instructions within are true and accurate.  I, Dr. Mati Hurley, personally performed the evaluation and management (E/M) services for this new patient. That E/M includes conducting the initial examination, assessing all conditions, and establishing the plan of care. Today, my ACP, Madalyn Granger PA-C, was here to observe my evaluation and management services for this patient to be followed going forward.

## 2024-10-01 NOTE — DISCUSSION/SUMMARY
[de-identified] : Discussed my findings with the patient. Imaging reviewed, which shows a right C7 SAP fracture adjacent to the right C7 nerve root, which corresponds to her severe neck and right upper extremity pain. Treatment options discussed. Prescriptions and instructions for a medrol dose pack and gabapentin given to the patient. She was also given a referral for a hard cervical collar. She will follow up with me in 2 weeks, sooner if there is an issue. All questions answered.

## 2024-10-01 NOTE — PHYSICAL EXAM
[de-identified] : Physical Exam:  General: patient is well developed, well nourished, in no acute  distress, alert and oriented x 3.   Mood and affect: normal  Respiratory: no respiratory distress noted  Alignment: The spine is well compensated in the coronal and sagittal plane.   Gait: The patient is able to toe walk and heel walk without difficulty.   Range of motion: Cervical spine ROM is deferred  Neurologic Exam: Motor: Manual Muscle testing in the upper and lower extremities is 5 out of 5 in all muscle groups. There is no evidence of muscular atrophy in the upper extremities. Sensory: Sensation to light touch is grossly intact in the upper and lower extremities  Reflexes: DTR are present and symmetric throughout, negative starr bilaterally, negative inverted radial reflex bilaterally, no clonus, plantar responses are flexor  Special tests: Spurlings sign absent. Lhermitte's sign is absent. .   [de-identified] : XR cervical 4 view 9/30/24 (my read): multilevel moderate/severe degenerative changes, no dynamic instability  CT cervical spine without contrast 9/26/24 (my read): right C7 superior articulating process fracture, adjacent to right C7 nerve root  CT cervical 9/2020: multilevel disc osteophytes and facet arthropathy; severe foraminal stenosis bilaterally C4/C5, left C3/C4, no acute fractures  XR cervical 9/23/20: multilevel degenerative changes, no dynamic instability, no acute fractures  XR pelvis 3/3/20: s/p right RUBIN, hardware intact, no evidence of fracture or dislocation  XR lumbar 3/3/20: multilevel degenerative changes, no spondylolisthesis, no fractures seen    XR cervical 8/15/18: moderate multilevel degenerative changes, no spondylolisthesis, no acute fractures  CT 8/8/18: Severe bilateral C4/5 foraminal stenosis, with osseous fragment within the left neural foramen, moderate-severe multilevel bilateral foraminal stenosis; no central canal stenosis; no large disc herniations, no spondylolisthesis.  CT myelogram 9/2018: severe left and mod to severe right C4/5 foraminal stenosis; C5/6 moderate central stenosis without signicant cord compression. Bilateral C5/6 and C6/7 foraminal stenosis. Extensive disc degeneration.

## 2024-10-07 ENCOUNTER — RX RENEWAL (OUTPATIENT)
Age: 79
End: 2024-10-07

## 2024-10-08 ENCOUNTER — RESULT REVIEW (OUTPATIENT)
Age: 79
End: 2024-10-08

## 2024-10-08 ENCOUNTER — OUTPATIENT (OUTPATIENT)
Dept: OUTPATIENT SERVICES | Facility: HOSPITAL | Age: 79
LOS: 1 days | End: 2024-10-08

## 2024-10-08 ENCOUNTER — APPOINTMENT (OUTPATIENT)
Dept: MAMMOGRAPHY | Facility: CLINIC | Age: 79
End: 2024-10-08
Payer: COMMERCIAL

## 2024-10-08 ENCOUNTER — APPOINTMENT (OUTPATIENT)
Dept: ULTRASOUND IMAGING | Facility: CLINIC | Age: 79
End: 2024-10-08
Payer: COMMERCIAL

## 2024-10-08 DIAGNOSIS — Z95.0 PRESENCE OF CARDIAC PACEMAKER: Chronic | ICD-10-CM

## 2024-10-08 DIAGNOSIS — Z96.641 PRESENCE OF RIGHT ARTIFICIAL HIP JOINT: Chronic | ICD-10-CM

## 2024-10-08 DIAGNOSIS — Z90.710 ACQUIRED ABSENCE OF BOTH CERVIX AND UTERUS: Chronic | ICD-10-CM

## 2024-10-08 PROCEDURE — 77067 SCR MAMMO BI INCL CAD: CPT | Mod: 26

## 2024-10-08 PROCEDURE — 76641 ULTRASOUND BREAST COMPLETE: CPT | Mod: 26,50

## 2024-10-08 PROCEDURE — 77063 BREAST TOMOSYNTHESIS BI: CPT | Mod: 26

## 2024-10-09 ENCOUNTER — NON-APPOINTMENT (OUTPATIENT)
Age: 79
End: 2024-10-09

## 2024-10-14 ENCOUNTER — RX RENEWAL (OUTPATIENT)
Age: 79
End: 2024-10-14

## 2024-10-16 ENCOUNTER — APPOINTMENT (OUTPATIENT)
Dept: ORTHOPEDIC SURGERY | Facility: CLINIC | Age: 79
End: 2024-10-16
Payer: COMMERCIAL

## 2024-10-16 DIAGNOSIS — S12.600A UNSPECIFIED DISPLACED FRACTURE OF SEVENTH CERVICAL VERTEBRA, INITIAL ENCOUNTER FOR CLOSED FRACTURE: ICD-10-CM

## 2024-10-16 DIAGNOSIS — M48.02 SPINAL STENOSIS, CERVICAL REGION: ICD-10-CM

## 2024-10-16 PROCEDURE — 99213 OFFICE O/P EST LOW 20 MIN: CPT

## 2024-10-16 RX ORDER — GABAPENTIN 300 MG/1
300 CAPSULE ORAL TWICE DAILY
Qty: 60 | Refills: 1 | Status: ACTIVE | COMMUNITY
Start: 2024-10-16 | End: 1900-01-01

## 2024-10-21 ENCOUNTER — RX RENEWAL (OUTPATIENT)
Age: 79
End: 2024-10-21

## 2024-10-22 ENCOUNTER — NON-APPOINTMENT (OUTPATIENT)
Age: 79
End: 2024-10-22

## 2024-10-22 ENCOUNTER — APPOINTMENT (OUTPATIENT)
Dept: HEART AND VASCULAR | Facility: CLINIC | Age: 79
End: 2024-10-22
Payer: COMMERCIAL

## 2024-10-22 PROCEDURE — 93296 REM INTERROG EVL PM/IDS: CPT

## 2024-10-22 PROCEDURE — 93294 REM INTERROG EVL PM/LDLS PM: CPT

## 2024-10-25 ENCOUNTER — APPOINTMENT (OUTPATIENT)
Dept: DERMATOLOGY | Facility: CLINIC | Age: 79
End: 2024-10-25
Payer: COMMERCIAL

## 2024-10-25 DIAGNOSIS — Z12.83 ENCOUNTER FOR SCREENING FOR MALIGNANT NEOPLASM OF SKIN: ICD-10-CM

## 2024-10-25 DIAGNOSIS — R20.2 PARESTHESIA OF SKIN: ICD-10-CM

## 2024-10-25 DIAGNOSIS — L82.1 OTHER SEBORRHEIC KERATOSIS: ICD-10-CM

## 2024-10-25 DIAGNOSIS — L40.8 OTHER PSORIASIS: ICD-10-CM

## 2024-10-25 DIAGNOSIS — L30.9 DERMATITIS, UNSPECIFIED: ICD-10-CM

## 2024-10-25 DIAGNOSIS — D22.9 MELANOCYTIC NEVI, UNSPECIFIED: ICD-10-CM

## 2024-10-25 PROCEDURE — 99214 OFFICE O/P EST MOD 30 MIN: CPT

## 2024-10-25 RX ORDER — TRIAMCINOLONE ACETONIDE 1 MG/G
0.1 OINTMENT TOPICAL
Qty: 1 | Refills: 1 | Status: ACTIVE | COMMUNITY
Start: 2024-10-25 | End: 1900-01-01

## 2024-10-25 RX ORDER — CLOBETASOL PROPIONATE 0.5 MG/G
0.05 OINTMENT TOPICAL
Qty: 1 | Refills: 2 | Status: ACTIVE | COMMUNITY
Start: 2024-10-25 | End: 1900-01-01

## 2024-11-06 ENCOUNTER — APPOINTMENT (OUTPATIENT)
Dept: ORTHOPEDIC SURGERY | Facility: CLINIC | Age: 79
End: 2024-11-06
Payer: COMMERCIAL

## 2024-11-06 DIAGNOSIS — S12.600A UNSPECIFIED DISPLACED FRACTURE OF SEVENTH CERVICAL VERTEBRA, INITIAL ENCOUNTER FOR CLOSED FRACTURE: ICD-10-CM

## 2024-11-06 PROCEDURE — 72040 X-RAY EXAM NECK SPINE 2-3 VW: CPT

## 2024-11-06 PROCEDURE — 99213 OFFICE O/P EST LOW 20 MIN: CPT

## 2024-12-17 ENCOUNTER — APPOINTMENT (OUTPATIENT)
Dept: ORTHOPEDIC SURGERY | Facility: CLINIC | Age: 79
End: 2024-12-17
Payer: COMMERCIAL

## 2024-12-17 DIAGNOSIS — S12.600A UNSPECIFIED DISPLACED FRACTURE OF SEVENTH CERVICAL VERTEBRA, INITIAL ENCOUNTER FOR CLOSED FRACTURE: ICD-10-CM

## 2024-12-17 PROCEDURE — 72050 X-RAY EXAM NECK SPINE 4/5VWS: CPT

## 2024-12-17 PROCEDURE — 99213 OFFICE O/P EST LOW 20 MIN: CPT

## 2025-01-21 ENCOUNTER — APPOINTMENT (OUTPATIENT)
Dept: HEART AND VASCULAR | Facility: CLINIC | Age: 80
End: 2025-01-21
Payer: COMMERCIAL

## 2025-01-21 ENCOUNTER — NON-APPOINTMENT (OUTPATIENT)
Age: 80
End: 2025-01-21

## 2025-01-21 PROCEDURE — 93294 REM INTERROG EVL PM/LDLS PM: CPT

## 2025-01-21 PROCEDURE — 93296 REM INTERROG EVL PM/IDS: CPT

## 2025-01-27 ENCOUNTER — APPOINTMENT (OUTPATIENT)
Dept: ORTHOPEDIC SURGERY | Facility: CLINIC | Age: 80
End: 2025-01-27
Payer: COMMERCIAL

## 2025-01-27 ENCOUNTER — TRANSCRIPTION ENCOUNTER (OUTPATIENT)
Age: 80
End: 2025-01-27

## 2025-01-27 DIAGNOSIS — M48.02 SPINAL STENOSIS, CERVICAL REGION: ICD-10-CM

## 2025-01-27 DIAGNOSIS — S12.600A UNSPECIFIED DISPLACED FRACTURE OF SEVENTH CERVICAL VERTEBRA, INITIAL ENCOUNTER FOR CLOSED FRACTURE: ICD-10-CM

## 2025-01-27 PROCEDURE — 72050 X-RAY EXAM NECK SPINE 4/5VWS: CPT

## 2025-01-27 PROCEDURE — 99213 OFFICE O/P EST LOW 20 MIN: CPT

## 2025-02-12 ENCOUNTER — APPOINTMENT (OUTPATIENT)
Dept: DERMATOLOGY | Facility: CLINIC | Age: 80
End: 2025-02-12

## 2025-02-12 VITALS — WEIGHT: 148 LBS | BODY MASS INDEX: 22.96 KG/M2 | HEIGHT: 67.5 IN

## 2025-02-12 DIAGNOSIS — L30.9 DERMATITIS, UNSPECIFIED: ICD-10-CM

## 2025-02-12 DIAGNOSIS — D48.9 NEOPLASM OF UNCERTAIN BEHAVIOR, UNSPECIFIED: ICD-10-CM

## 2025-02-12 PROCEDURE — 99214 OFFICE O/P EST MOD 30 MIN: CPT | Mod: 25

## 2025-02-12 PROCEDURE — 11104 PUNCH BX SKIN SINGLE LESION: CPT

## 2025-02-12 RX ORDER — HYDROXYZINE HYDROCHLORIDE 10 MG/1
10 TABLET ORAL
Qty: 60 | Refills: 2 | Status: ACTIVE | COMMUNITY
Start: 2025-02-12 | End: 1900-01-01

## 2025-02-12 RX ORDER — HYDROCORTISONE 25 MG/G
2.5 OINTMENT TOPICAL
Qty: 1 | Refills: 2 | Status: ACTIVE | COMMUNITY
Start: 2025-02-12 | End: 1900-01-01

## 2025-02-12 RX ORDER — PREDNISONE 10 MG/1
10 TABLET ORAL
Qty: 53 | Refills: 0 | Status: ACTIVE | COMMUNITY
Start: 2025-02-12 | End: 1900-01-01

## 2025-02-12 RX ORDER — CLOBETASOL PROPIONATE 0.5 MG/ML
0.05 SOLUTION TOPICAL
Qty: 50 | Refills: 2 | Status: ACTIVE | COMMUNITY
Start: 2025-02-12 | End: 1900-01-01

## 2025-02-19 LAB — DERMATOLOGY BIOPSY: NORMAL

## 2025-03-03 ENCOUNTER — APPOINTMENT (OUTPATIENT)
Dept: HEART AND VASCULAR | Facility: CLINIC | Age: 80
End: 2025-03-03
Payer: COMMERCIAL

## 2025-03-03 ENCOUNTER — NON-APPOINTMENT (OUTPATIENT)
Age: 80
End: 2025-03-03

## 2025-03-03 VITALS
WEIGHT: 150 LBS | DIASTOLIC BLOOD PRESSURE: 82 MMHG | TEMPERATURE: 97 F | BODY MASS INDEX: 23.54 KG/M2 | SYSTOLIC BLOOD PRESSURE: 131 MMHG | OXYGEN SATURATION: 96 % | HEART RATE: 81 BPM | HEIGHT: 67 IN

## 2025-03-03 PROCEDURE — 99214 OFFICE O/P EST MOD 30 MIN: CPT

## 2025-03-03 PROCEDURE — 36415 COLL VENOUS BLD VENIPUNCTURE: CPT

## 2025-03-03 PROCEDURE — 93000 ELECTROCARDIOGRAM COMPLETE: CPT

## 2025-03-04 LAB
ALBUMIN SERPL ELPH-MCNC: 4.5 G/DL
ALP BLD-CCNC: 109 U/L
ALT SERPL-CCNC: 16 U/L
ANION GAP SERPL CALC-SCNC: 16 MMOL/L
AST SERPL-CCNC: 10 U/L
BASOPHILS # BLD AUTO: 0.04 K/UL
BASOPHILS NFR BLD AUTO: 0.3 %
BILIRUB SERPL-MCNC: 1 MG/DL
BUN SERPL-MCNC: 17 MG/DL
CALCIUM SERPL-MCNC: 9.8 MG/DL
CHLORIDE SERPL-SCNC: 98 MMOL/L
CHOLEST SERPL-MCNC: 212 MG/DL
CO2 SERPL-SCNC: 24 MMOL/L
CREAT SERPL-MCNC: 0.73 MG/DL
CREAT SPEC-SCNC: 144 MG/DL
EGFRCR SERPLBLD CKD-EPI 2021: 84 ML/MIN/1.73M2
EOSINOPHIL # BLD AUTO: 0.02 K/UL
EOSINOPHIL NFR BLD AUTO: 0.2 %
ESTIMATED AVERAGE GLUCOSE: 154 MG/DL
GLUCOSE SERPL-MCNC: 147 MG/DL
HBA1C MFR BLD HPLC: 7 %
HCT VFR BLD CALC: 41 %
HDLC SERPL-MCNC: 90 MG/DL
HGB BLD-MCNC: 13.7 G/DL
IMM GRANULOCYTES NFR BLD AUTO: 0.5 %
LDLC SERPL CALC-MCNC: 100 MG/DL
LYMPHOCYTES # BLD AUTO: 1.07 K/UL
LYMPHOCYTES NFR BLD AUTO: 8.2 %
MAGNESIUM SERPL-MCNC: 1.9 MG/DL
MAN DIFF?: NORMAL
MCHC RBC-ENTMCNC: 29.6 PG
MCHC RBC-ENTMCNC: 33.4 G/DL
MCV RBC AUTO: 88.6 FL
MICROALBUMIN 24H UR DL<=1MG/L-MCNC: 2.6 MG/DL
MICROALBUMIN/CREAT 24H UR-RTO: 18 MG/G
MONOCYTES # BLD AUTO: 0.35 K/UL
MONOCYTES NFR BLD AUTO: 2.7 %
NEUTROPHILS # BLD AUTO: 11.49 K/UL
NEUTROPHILS NFR BLD AUTO: 88.1 %
NONHDLC SERPL-MCNC: 123 MG/DL
PLATELET # BLD AUTO: 326 K/UL
POTASSIUM SERPL-SCNC: 4.5 MMOL/L
PROT SERPL-MCNC: 6.4 G/DL
RBC # BLD: 4.63 M/UL
RBC # FLD: 13.4 %
SODIUM SERPL-SCNC: 137 MMOL/L
TRIGL SERPL-MCNC: 134 MG/DL
TSH SERPL-ACNC: 0.61 UIU/ML
WBC # FLD AUTO: 13.03 K/UL

## 2025-03-13 ENCOUNTER — APPOINTMENT (OUTPATIENT)
Dept: DERMATOLOGY | Facility: CLINIC | Age: 80
End: 2025-03-13

## 2025-03-13 ENCOUNTER — NON-APPOINTMENT (OUTPATIENT)
Age: 80
End: 2025-03-13

## 2025-03-13 DIAGNOSIS — L20.9 ATOPIC DERMATITIS, UNSPECIFIED: ICD-10-CM

## 2025-03-13 DIAGNOSIS — L30.9 DERMATITIS, UNSPECIFIED: ICD-10-CM

## 2025-03-13 PROCEDURE — 99214 OFFICE O/P EST MOD 30 MIN: CPT

## 2025-03-17 ENCOUNTER — APPOINTMENT (OUTPATIENT)
Dept: HEART AND VASCULAR | Facility: CLINIC | Age: 80
End: 2025-03-17
Payer: COMMERCIAL

## 2025-03-17 ENCOUNTER — RX RENEWAL (OUTPATIENT)
Age: 80
End: 2025-03-17

## 2025-03-17 ENCOUNTER — NON-APPOINTMENT (OUTPATIENT)
Age: 80
End: 2025-03-17

## 2025-03-17 VITALS
DIASTOLIC BLOOD PRESSURE: 70 MMHG | SYSTOLIC BLOOD PRESSURE: 109 MMHG | HEART RATE: 87 BPM | WEIGHT: 150 LBS | HEIGHT: 67 IN | BODY MASS INDEX: 23.54 KG/M2

## 2025-03-17 PROCEDURE — 93280 PM DEVICE PROGR EVAL DUAL: CPT

## 2025-03-17 RX ORDER — ATORVASTATIN CALCIUM 20 MG/1
20 TABLET, FILM COATED ORAL
Qty: 90 | Refills: 1 | Status: ACTIVE | COMMUNITY
Start: 2025-03-17 | End: 1900-01-01

## 2025-03-18 RX ORDER — RUXOLITINIB 15 MG/G
1.5 CREAM TOPICAL
Qty: 1 | Refills: 2 | Status: ACTIVE | COMMUNITY
Start: 2025-03-13

## 2025-03-24 ENCOUNTER — RX RENEWAL (OUTPATIENT)
Age: 80
End: 2025-03-24

## 2025-04-02 ENCOUNTER — APPOINTMENT (OUTPATIENT)
Dept: DERMATOLOGY | Facility: CLINIC | Age: 80
End: 2025-04-02
Payer: COMMERCIAL

## 2025-04-02 PROCEDURE — 95044 PATCH/APPLICATION TESTS: CPT

## 2025-04-02 PROCEDURE — 99213 OFFICE O/P EST LOW 20 MIN: CPT | Mod: 25

## 2025-04-04 ENCOUNTER — APPOINTMENT (OUTPATIENT)
Dept: DERMATOLOGY | Facility: CLINIC | Age: 80
End: 2025-04-04
Payer: COMMERCIAL

## 2025-04-04 PROCEDURE — 99212 OFFICE O/P EST SF 10 MIN: CPT

## 2025-04-07 ENCOUNTER — APPOINTMENT (OUTPATIENT)
Dept: DERMATOLOGY | Facility: CLINIC | Age: 80
End: 2025-04-07
Payer: COMMERCIAL

## 2025-04-07 ENCOUNTER — APPOINTMENT (OUTPATIENT)
Dept: DERMATOLOGY | Facility: CLINIC | Age: 80
End: 2025-04-07

## 2025-04-07 DIAGNOSIS — L30.9 DERMATITIS, UNSPECIFIED: ICD-10-CM

## 2025-04-07 PROCEDURE — 99214 OFFICE O/P EST MOD 30 MIN: CPT

## 2025-04-07 PROCEDURE — G2211 COMPLEX E/M VISIT ADD ON: CPT

## 2025-04-17 ENCOUNTER — APPOINTMENT (OUTPATIENT)
Dept: DERMATOLOGY | Facility: CLINIC | Age: 80
End: 2025-04-17
Payer: COMMERCIAL

## 2025-04-17 DIAGNOSIS — L23.9 ALLERGIC CONTACT DERMATITIS, UNSPECIFIED CAUSE: ICD-10-CM

## 2025-04-17 PROCEDURE — 99214 OFFICE O/P EST MOD 30 MIN: CPT

## 2025-05-05 ENCOUNTER — APPOINTMENT (OUTPATIENT)
Dept: PULMONOLOGY | Facility: CLINIC | Age: 80
End: 2025-05-05
Payer: COMMERCIAL

## 2025-05-05 VITALS
TEMPERATURE: 97.9 F | HEIGHT: 67 IN | HEART RATE: 89 BPM | SYSTOLIC BLOOD PRESSURE: 102 MMHG | OXYGEN SATURATION: 96 % | WEIGHT: 150 LBS | DIASTOLIC BLOOD PRESSURE: 70 MMHG | BODY MASS INDEX: 23.54 KG/M2

## 2025-05-05 DIAGNOSIS — J45.20 MILD INTERMITTENT ASTHMA, UNCOMPLICATED: ICD-10-CM

## 2025-05-05 PROCEDURE — 99213 OFFICE O/P EST LOW 20 MIN: CPT | Mod: 25

## 2025-05-05 PROCEDURE — 94010 BREATHING CAPACITY TEST: CPT

## 2025-06-16 ENCOUNTER — NON-APPOINTMENT (OUTPATIENT)
Age: 80
End: 2025-06-16

## 2025-06-16 ENCOUNTER — APPOINTMENT (OUTPATIENT)
Dept: HEART AND VASCULAR | Facility: CLINIC | Age: 80
End: 2025-06-16
Payer: COMMERCIAL

## 2025-06-16 PROCEDURE — 93294 REM INTERROG EVL PM/LDLS PM: CPT

## 2025-06-16 PROCEDURE — 93296 REM INTERROG EVL PM/IDS: CPT

## 2025-07-03 ENCOUNTER — APPOINTMENT (OUTPATIENT)
Dept: ORTHOPEDIC SURGERY | Facility: CLINIC | Age: 80
End: 2025-07-03
Payer: COMMERCIAL

## 2025-07-03 PROBLEM — I10 HYPERTENSION: Status: RESOLVED | Noted: 2025-07-03 | Resolved: 2025-07-03

## 2025-07-03 PROCEDURE — 73503 X-RAY EXAM HIP UNI 4/> VIEWS: CPT | Mod: LT

## 2025-07-03 PROCEDURE — 72100 X-RAY EXAM L-S SPINE 2/3 VWS: CPT

## 2025-07-03 PROCEDURE — 99205 OFFICE O/P NEW HI 60 MIN: CPT

## 2025-07-03 RX ORDER — MELOXICAM 15 MG/1
15 TABLET ORAL
Qty: 30 | Refills: 2 | Status: ACTIVE | COMMUNITY
Start: 2025-07-03 | End: 1900-01-01

## 2025-07-08 ENCOUNTER — NON-APPOINTMENT (OUTPATIENT)
Age: 80
End: 2025-07-08

## 2025-07-08 ENCOUNTER — APPOINTMENT (OUTPATIENT)
Dept: HEART AND VASCULAR | Facility: CLINIC | Age: 80
End: 2025-07-08
Payer: COMMERCIAL

## 2025-07-08 VITALS
SYSTOLIC BLOOD PRESSURE: 147 MMHG | DIASTOLIC BLOOD PRESSURE: 80 MMHG | HEART RATE: 78 BPM | BODY MASS INDEX: 24.33 KG/M2 | TEMPERATURE: 97.8 F | OXYGEN SATURATION: 95 % | WEIGHT: 155 LBS | HEIGHT: 67 IN

## 2025-07-08 VITALS — SYSTOLIC BLOOD PRESSURE: 127 MMHG | DIASTOLIC BLOOD PRESSURE: 80 MMHG

## 2025-07-08 PROBLEM — Z01.818 PREOPERATIVE CLEARANCE: Status: ACTIVE | Noted: 2025-07-08

## 2025-07-08 PROCEDURE — 93000 ELECTROCARDIOGRAM COMPLETE: CPT | Mod: NC

## 2025-07-08 PROCEDURE — 36415 COLL VENOUS BLD VENIPUNCTURE: CPT

## 2025-07-08 PROCEDURE — 99214 OFFICE O/P EST MOD 30 MIN: CPT

## 2025-07-08 RX ORDER — ASPIRIN 81 MG/1
81 TABLET, COATED ORAL
Refills: 0 | Status: ACTIVE | COMMUNITY

## 2025-07-09 LAB
ALBUMIN SERPL ELPH-MCNC: 5.1 G/DL
ALP BLD-CCNC: 123 U/L
ALT SERPL-CCNC: 28 U/L
ANION GAP SERPL CALC-SCNC: 16 MMOL/L
APPEARANCE: CLEAR
APTT BLD: 31.8 SEC
AST SERPL-CCNC: 28 U/L
BACTERIA: NEGATIVE /HPF
BASOPHILS # BLD AUTO: 0.07 K/UL
BASOPHILS NFR BLD AUTO: 1 %
BILIRUB SERPL-MCNC: 1.8 MG/DL
BILIRUBIN URINE: NEGATIVE
BLOOD URINE: NEGATIVE
BUN SERPL-MCNC: 18 MG/DL
CALCIUM SERPL-MCNC: 10.2 MG/DL
CAST: 0 /LPF
CHLORIDE SERPL-SCNC: 102 MMOL/L
CO2 SERPL-SCNC: 23 MMOL/L
COLOR: YELLOW
CREAT SERPL-MCNC: 0.93 MG/DL
EGFRCR SERPLBLD CKD-EPI 2021: 63 ML/MIN/1.73M2
EOSINOPHIL # BLD AUTO: 0.14 K/UL
EOSINOPHIL NFR BLD AUTO: 2 %
EPITHELIAL CELLS: 2 /HPF
GLUCOSE QUALITATIVE U: NEGATIVE MG/DL
GLUCOSE SERPL-MCNC: 94 MG/DL
HCT VFR BLD CALC: 43.4 %
HGB BLD-MCNC: 13.8 G/DL
IMM GRANULOCYTES NFR BLD AUTO: 0.1 %
INR PPP: 0.96 RATIO
KETONES URINE: ABNORMAL MG/DL
LEUKOCYTE ESTERASE URINE: ABNORMAL
LYMPHOCYTES # BLD AUTO: 1.58 K/UL
LYMPHOCYTES NFR BLD AUTO: 22.3 %
MAN DIFF?: NORMAL
MCHC RBC-ENTMCNC: 29.8 PG
MCHC RBC-ENTMCNC: 31.8 G/DL
MCV RBC AUTO: 93.7 FL
MICROSCOPIC-UA: NORMAL
MONOCYTES # BLD AUTO: 0.4 K/UL
MONOCYTES NFR BLD AUTO: 5.6 %
NEUTROPHILS # BLD AUTO: 4.88 K/UL
NEUTROPHILS NFR BLD AUTO: 69 %
NITRITE URINE: NEGATIVE
PH URINE: 5.5
PLATELET # BLD AUTO: 306 K/UL
POTASSIUM SERPL-SCNC: 4.3 MMOL/L
PROT SERPL-MCNC: 7.2 G/DL
PROTEIN URINE: 30 MG/DL
PT BLD: 11.4 SEC
RBC # BLD: 4.63 M/UL
RBC # FLD: 13.4 %
RED BLOOD CELLS URINE: 1 /HPF
SODIUM SERPL-SCNC: 142 MMOL/L
SPECIFIC GRAVITY URINE: 1.03
UROBILINOGEN URINE: 0.2 MG/DL
WBC # FLD AUTO: 7.08 K/UL
WHITE BLOOD CELLS URINE: 4 /HPF

## 2025-07-16 ENCOUNTER — NON-APPOINTMENT (OUTPATIENT)
Age: 80
End: 2025-07-16

## 2025-07-17 ENCOUNTER — APPOINTMENT (OUTPATIENT)
Dept: DERMATOLOGY | Facility: CLINIC | Age: 80
End: 2025-07-17
Payer: COMMERCIAL

## 2025-07-17 PROCEDURE — 99214 OFFICE O/P EST MOD 30 MIN: CPT

## 2025-07-17 PROCEDURE — G2211 COMPLEX E/M VISIT ADD ON: CPT

## 2025-07-22 ENCOUNTER — OUTPATIENT (OUTPATIENT)
Dept: OUTPATIENT SERVICES | Facility: HOSPITAL | Age: 80
LOS: 1 days | End: 2025-07-22

## 2025-07-22 VITALS
OXYGEN SATURATION: 98 % | DIASTOLIC BLOOD PRESSURE: 80 MMHG | TEMPERATURE: 98 F | HEIGHT: 68 IN | WEIGHT: 164.69 LBS | RESPIRATION RATE: 18 BRPM | HEART RATE: 74 BPM | SYSTOLIC BLOOD PRESSURE: 153 MMHG

## 2025-07-22 DIAGNOSIS — Z95.0 PRESENCE OF CARDIAC PACEMAKER: Chronic | ICD-10-CM

## 2025-07-22 DIAGNOSIS — J45.909 UNSPECIFIED ASTHMA, UNCOMPLICATED: ICD-10-CM

## 2025-07-22 DIAGNOSIS — I49.9 CARDIAC ARRHYTHMIA, UNSPECIFIED: ICD-10-CM

## 2025-07-22 DIAGNOSIS — Z90.710 ACQUIRED ABSENCE OF BOTH CERVIX AND UTERUS: Chronic | ICD-10-CM

## 2025-07-22 DIAGNOSIS — M16.12 UNILATERAL PRIMARY OSTEOARTHRITIS, LEFT HIP: ICD-10-CM

## 2025-07-22 DIAGNOSIS — I10 ESSENTIAL (PRIMARY) HYPERTENSION: ICD-10-CM

## 2025-07-22 DIAGNOSIS — Z96.641 PRESENCE OF RIGHT ARTIFICIAL HIP JOINT: Chronic | ICD-10-CM

## 2025-07-22 DIAGNOSIS — Z01.818 ENCOUNTER FOR OTHER PREPROCEDURAL EXAMINATION: ICD-10-CM

## 2025-07-22 LAB
A1C WITH ESTIMATED AVERAGE GLUCOSE RESULT: 6.1 % — HIGH (ref 4–5.6)
ALBUMIN SERPL ELPH-MCNC: 4.1 G/DL — SIGNIFICANT CHANGE UP (ref 3.3–5)
ALP SERPL-CCNC: 117 U/L — SIGNIFICANT CHANGE UP (ref 40–120)
ALT FLD-CCNC: 29 U/L — SIGNIFICANT CHANGE UP (ref 12–78)
ANION GAP SERPL CALC-SCNC: 8 MMOL/L — SIGNIFICANT CHANGE UP (ref 5–17)
APTT BLD: 30.6 SEC — SIGNIFICANT CHANGE UP (ref 26.1–36.8)
AST SERPL-CCNC: 14 U/L — LOW (ref 15–37)
BASOPHILS # BLD AUTO: 0.07 K/UL — SIGNIFICANT CHANGE UP (ref 0–0.2)
BASOPHILS NFR BLD AUTO: 1.3 % — SIGNIFICANT CHANGE UP (ref 0–2)
BILIRUB SERPL-MCNC: 1.5 MG/DL — HIGH (ref 0.2–1.2)
BLD GP AB SCN SERPL QL: SIGNIFICANT CHANGE UP
BUN SERPL-MCNC: 14 MG/DL — SIGNIFICANT CHANGE UP (ref 7–23)
CALCIUM SERPL-MCNC: 9.7 MG/DL — SIGNIFICANT CHANGE UP (ref 8.5–10.1)
CHLORIDE SERPL-SCNC: 105 MMOL/L — SIGNIFICANT CHANGE UP (ref 96–108)
CO2 SERPL-SCNC: 27 MMOL/L — SIGNIFICANT CHANGE UP (ref 22–31)
CREAT SERPL-MCNC: 0.91 MG/DL — SIGNIFICANT CHANGE UP (ref 0.5–1.3)
EGFR: 65 ML/MIN/1.73M2 — SIGNIFICANT CHANGE UP
EGFR: 65 ML/MIN/1.73M2 — SIGNIFICANT CHANGE UP
EOSINOPHIL # BLD AUTO: 0.14 K/UL — SIGNIFICANT CHANGE UP (ref 0–0.5)
EOSINOPHIL NFR BLD AUTO: 2.6 % — SIGNIFICANT CHANGE UP (ref 0–6)
ESTIMATED AVERAGE GLUCOSE: 128 MG/DL — HIGH (ref 68–114)
GLUCOSE SERPL-MCNC: 103 MG/DL — HIGH (ref 70–99)
HCT VFR BLD CALC: 42.7 % — SIGNIFICANT CHANGE UP (ref 34.5–45)
HGB BLD-MCNC: 14.3 G/DL — SIGNIFICANT CHANGE UP (ref 11.5–15.5)
IMM GRANULOCYTES NFR BLD AUTO: 0.2 % — SIGNIFICANT CHANGE UP (ref 0–0.9)
INR BLD: 1 RATIO — SIGNIFICANT CHANGE UP (ref 0.85–1.16)
LYMPHOCYTES # BLD AUTO: 1.48 K/UL — SIGNIFICANT CHANGE UP (ref 1–3.3)
LYMPHOCYTES # BLD AUTO: 27.7 % — SIGNIFICANT CHANGE UP (ref 13–44)
MCHC RBC-ENTMCNC: 29.4 PG — SIGNIFICANT CHANGE UP (ref 27–34)
MCHC RBC-ENTMCNC: 33.5 G/DL — SIGNIFICANT CHANGE UP (ref 32–36)
MCV RBC AUTO: 87.9 FL — SIGNIFICANT CHANGE UP (ref 80–100)
MONOCYTES # BLD AUTO: 0.45 K/UL — SIGNIFICANT CHANGE UP (ref 0–0.9)
MONOCYTES NFR BLD AUTO: 8.4 % — SIGNIFICANT CHANGE UP (ref 2–14)
MRSA PCR RESULT.: SIGNIFICANT CHANGE UP
NEUTROPHILS # BLD AUTO: 3.2 K/UL — SIGNIFICANT CHANGE UP (ref 1.8–7.4)
NEUTROPHILS NFR BLD AUTO: 59.8 % — SIGNIFICANT CHANGE UP (ref 43–77)
NRBC BLD AUTO-RTO: 0 /100 WBCS — SIGNIFICANT CHANGE UP (ref 0–0)
PLATELET # BLD AUTO: 286 K/UL — SIGNIFICANT CHANGE UP (ref 150–400)
POTASSIUM SERPL-MCNC: 3.4 MMOL/L — LOW (ref 3.5–5.3)
POTASSIUM SERPL-SCNC: 3.4 MMOL/L — LOW (ref 3.5–5.3)
PROT SERPL-MCNC: 7.6 GM/DL — SIGNIFICANT CHANGE UP (ref 6–8.3)
PROTHROM AB SERPL-ACNC: 11.3 SEC — SIGNIFICANT CHANGE UP (ref 9.9–13.4)
RBC # BLD: 4.86 M/UL — SIGNIFICANT CHANGE UP (ref 3.8–5.2)
RBC # FLD: 12.8 % — SIGNIFICANT CHANGE UP (ref 10.3–14.5)
S AUREUS DNA NOSE QL NAA+PROBE: SIGNIFICANT CHANGE UP
SODIUM SERPL-SCNC: 140 MMOL/L — SIGNIFICANT CHANGE UP (ref 135–145)
VIT D25+D1,25 OH+D1,25 PNL SERPL-MCNC: 79.2 PG/ML — SIGNIFICANT CHANGE UP (ref 19.9–79.3)
WBC # BLD: 5.35 K/UL — SIGNIFICANT CHANGE UP (ref 3.8–10.5)
WBC # FLD AUTO: 5.35 K/UL — SIGNIFICANT CHANGE UP (ref 3.8–10.5)

## 2025-07-24 ENCOUNTER — NON-APPOINTMENT (OUTPATIENT)
Age: 80
End: 2025-07-24

## 2025-07-25 ENCOUNTER — APPOINTMENT (OUTPATIENT)
Dept: INTERNAL MEDICINE | Facility: CLINIC | Age: 80
End: 2025-07-25
Payer: COMMERCIAL

## 2025-07-25 VITALS
BODY MASS INDEX: 24.33 KG/M2 | OXYGEN SATURATION: 95 % | HEART RATE: 65 BPM | HEIGHT: 67 IN | TEMPERATURE: 97.1 F | SYSTOLIC BLOOD PRESSURE: 131 MMHG | DIASTOLIC BLOOD PRESSURE: 81 MMHG | RESPIRATION RATE: 15 BRPM | WEIGHT: 155 LBS

## 2025-07-25 DIAGNOSIS — I25.10 ATHEROSCLEROTIC HEART DISEASE OF NATIVE CORONARY ARTERY W/OUT ANGINA PECTORIS: ICD-10-CM

## 2025-07-25 DIAGNOSIS — I49.5 SICK SINUS SYNDROME: ICD-10-CM

## 2025-07-25 DIAGNOSIS — R73.03 PREDIABETES.: ICD-10-CM

## 2025-07-25 DIAGNOSIS — L03.019 CELLULITIS OF UNSPECIFIED FINGER: ICD-10-CM

## 2025-07-25 DIAGNOSIS — I10 ESSENTIAL (PRIMARY) HYPERTENSION: ICD-10-CM

## 2025-07-25 DIAGNOSIS — J45.20 MILD INTERMITTENT ASTHMA, UNCOMPLICATED: ICD-10-CM

## 2025-07-25 PROCEDURE — 99214 OFFICE O/P EST MOD 30 MIN: CPT

## 2025-07-29 ENCOUNTER — APPOINTMENT (OUTPATIENT)
Dept: ORTHOPEDIC SURGERY | Facility: CLINIC | Age: 80
End: 2025-07-29
Payer: COMMERCIAL

## 2025-07-29 DIAGNOSIS — M16.12 UNILATERAL PRIMARY OSTEOARTHRITIS, LEFT HIP: ICD-10-CM

## 2025-07-29 PROCEDURE — 99215 OFFICE O/P EST HI 40 MIN: CPT

## 2025-08-05 RX ORDER — DILTIAZEM HYDROCHLORIDE 240 MG/1
60 TABLET, EXTENDED RELEASE ORAL
Refills: 0 | Status: DISCONTINUED | OUTPATIENT
Start: 2025-08-06 | End: 2025-08-07

## 2025-08-05 RX ORDER — HYDROMORPHONE/SOD CHLOR,ISO/PF 2 MG/10 ML
0.5 SYRINGE (ML) INJECTION
Refills: 0 | Status: DISCONTINUED | OUTPATIENT
Start: 2025-08-06 | End: 2025-08-07

## 2025-08-05 RX ORDER — BISACODYL 5 MG
10 TABLET, DELAYED RELEASE (ENTERIC COATED) ORAL ONCE
Refills: 0 | Status: CANCELLED | OUTPATIENT
Start: 2025-08-08 | End: 2025-08-07

## 2025-08-05 RX ORDER — GABAPENTIN 400 MG/1
100 CAPSULE ORAL THREE TIMES A DAY
Refills: 0 | Status: DISCONTINUED | OUTPATIENT
Start: 2025-08-06 | End: 2025-08-07

## 2025-08-05 RX ORDER — B1/B2/B3/B5/B6/B12/VIT C/FOLIC 500-0.5 MG
1 TABLET ORAL DAILY
Refills: 0 | Status: DISCONTINUED | OUTPATIENT
Start: 2025-08-06 | End: 2025-08-07

## 2025-08-05 RX ORDER — ATORVASTATIN CALCIUM 80 MG/1
40 TABLET, FILM COATED ORAL AT BEDTIME
Refills: 0 | Status: DISCONTINUED | OUTPATIENT
Start: 2025-08-06 | End: 2025-08-07

## 2025-08-05 RX ORDER — SODIUM CHLORIDE 9 G/1000ML
1000 INJECTION, SOLUTION INTRAVENOUS
Refills: 0 | Status: DISCONTINUED | OUTPATIENT
Start: 2025-08-06 | End: 2025-08-07

## 2025-08-05 RX ORDER — LISINOPRIL 5 MG/1
40 TABLET ORAL DAILY
Refills: 0 | Status: DISCONTINUED | OUTPATIENT
Start: 2025-08-06 | End: 2025-08-07

## 2025-08-05 RX ORDER — CELECOXIB 50 MG/1
200 CAPSULE ORAL EVERY 12 HOURS
Refills: 0 | Status: DISCONTINUED | OUTPATIENT
Start: 2025-08-07 | End: 2025-08-07

## 2025-08-05 RX ORDER — DEXAMETHASONE 0.5 MG/1
8 TABLET ORAL ONCE
Refills: 0 | Status: COMPLETED | OUTPATIENT
Start: 2025-08-07 | End: 2025-08-07

## 2025-08-05 RX ORDER — ASPIRIN 325 MG
81 TABLET ORAL
Refills: 0 | Status: DISCONTINUED | OUTPATIENT
Start: 2025-08-07 | End: 2025-08-07

## 2025-08-05 RX ORDER — ONDANSETRON HCL/PF 4 MG/2 ML
4 VIAL (ML) INJECTION EVERY 6 HOURS
Refills: 0 | Status: DISCONTINUED | OUTPATIENT
Start: 2025-08-06 | End: 2025-08-07

## 2025-08-05 RX ORDER — SENNA 187 MG
2 TABLET ORAL AT BEDTIME
Refills: 0 | Status: DISCONTINUED | OUTPATIENT
Start: 2025-08-06 | End: 2025-08-07

## 2025-08-05 RX ORDER — POLYETHYLENE GLYCOL 3350 17 G/17G
17 POWDER, FOR SOLUTION ORAL AT BEDTIME
Refills: 0 | Status: DISCONTINUED | OUTPATIENT
Start: 2025-08-06 | End: 2025-08-07

## 2025-08-05 RX ORDER — MELATONIN 5 MG
3 TABLET ORAL AT BEDTIME
Refills: 0 | Status: DISCONTINUED | OUTPATIENT
Start: 2025-08-06 | End: 2025-08-07

## 2025-08-05 RX ORDER — ACETAMINOPHEN 500 MG/5ML
1000 LIQUID (ML) ORAL ONCE
Refills: 0 | Status: DISCONTINUED | OUTPATIENT
Start: 2025-08-06 | End: 2025-08-07

## 2025-08-05 RX ORDER — MAGNESIUM HYDROXIDE 400 MG/5ML
30 SUSPENSION ORAL DAILY
Refills: 0 | Status: DISCONTINUED | OUTPATIENT
Start: 2025-08-06 | End: 2025-08-07

## 2025-08-05 RX ORDER — KETOROLAC TROMETHAMINE 30 MG/ML
15 INJECTION, SOLUTION INTRAMUSCULAR; INTRAVENOUS EVERY 6 HOURS
Refills: 0 | Status: DISCONTINUED | OUTPATIENT
Start: 2025-08-06 | End: 2025-08-07

## 2025-08-06 ENCOUNTER — INPATIENT (INPATIENT)
Facility: HOSPITAL | Age: 80
LOS: 0 days | Discharge: HOME | End: 2025-08-07
Attending: ORTHOPAEDIC SURGERY | Admitting: ORTHOPAEDIC SURGERY
Payer: COMMERCIAL

## 2025-08-06 ENCOUNTER — TRANSCRIPTION ENCOUNTER (OUTPATIENT)
Age: 80
End: 2025-08-06

## 2025-08-06 ENCOUNTER — APPOINTMENT (OUTPATIENT)
Dept: ORTHOPEDIC SURGERY | Facility: HOSPITAL | Age: 80
End: 2025-08-06
Payer: COMMERCIAL

## 2025-08-06 ENCOUNTER — RESULT REVIEW (OUTPATIENT)
Age: 80
End: 2025-08-06

## 2025-08-06 VITALS
HEART RATE: 68 BPM | SYSTOLIC BLOOD PRESSURE: 176 MMHG | DIASTOLIC BLOOD PRESSURE: 95 MMHG | TEMPERATURE: 98 F | OXYGEN SATURATION: 98 % | RESPIRATION RATE: 18 BRPM | HEIGHT: 68 IN | WEIGHT: 164.69 LBS

## 2025-08-06 DIAGNOSIS — Z95.0 PRESENCE OF CARDIAC PACEMAKER: Chronic | ICD-10-CM

## 2025-08-06 DIAGNOSIS — Z96.641 PRESENCE OF RIGHT ARTIFICIAL HIP JOINT: Chronic | ICD-10-CM

## 2025-08-06 DIAGNOSIS — Z90.710 ACQUIRED ABSENCE OF BOTH CERVIX AND UTERUS: Chronic | ICD-10-CM

## 2025-08-06 LAB
ANION GAP SERPL CALC-SCNC: 5 MMOL/L — SIGNIFICANT CHANGE UP (ref 5–17)
BLD GP AB SCN SERPL QL: SIGNIFICANT CHANGE UP
BUN SERPL-MCNC: 15 MG/DL — SIGNIFICANT CHANGE UP (ref 7–23)
CALCIUM SERPL-MCNC: 9.6 MG/DL — SIGNIFICANT CHANGE UP (ref 8.5–10.1)
CHLORIDE SERPL-SCNC: 106 MMOL/L — SIGNIFICANT CHANGE UP (ref 96–108)
CO2 SERPL-SCNC: 28 MMOL/L — SIGNIFICANT CHANGE UP (ref 22–31)
CREAT SERPL-MCNC: 0.83 MG/DL — SIGNIFICANT CHANGE UP (ref 0.5–1.3)
EGFR: 72 ML/MIN/1.73M2 — SIGNIFICANT CHANGE UP
EGFR: 72 ML/MIN/1.73M2 — SIGNIFICANT CHANGE UP
GLUCOSE BLDC GLUCOMTR-MCNC: 118 MG/DL — HIGH (ref 70–99)
GLUCOSE SERPL-MCNC: 134 MG/DL — HIGH (ref 70–99)
HCT VFR BLD CALC: 36.2 % — SIGNIFICANT CHANGE UP (ref 34.5–45)
HGB BLD-MCNC: 12.4 G/DL — SIGNIFICANT CHANGE UP (ref 11.5–15.5)
MCHC RBC-ENTMCNC: 30.2 PG — SIGNIFICANT CHANGE UP (ref 27–34)
MCHC RBC-ENTMCNC: 34.3 G/DL — SIGNIFICANT CHANGE UP (ref 32–36)
MCV RBC AUTO: 88.1 FL — SIGNIFICANT CHANGE UP (ref 80–100)
NRBC BLD AUTO-RTO: 0 /100 WBCS — SIGNIFICANT CHANGE UP (ref 0–0)
PLATELET # BLD AUTO: 268 K/UL — SIGNIFICANT CHANGE UP (ref 150–400)
POTASSIUM SERPL-MCNC: 4 MMOL/L — SIGNIFICANT CHANGE UP (ref 3.5–5.3)
POTASSIUM SERPL-SCNC: 4 MMOL/L — SIGNIFICANT CHANGE UP (ref 3.5–5.3)
RBC # BLD: 4.11 M/UL — SIGNIFICANT CHANGE UP (ref 3.8–5.2)
RBC # FLD: 12.9 % — SIGNIFICANT CHANGE UP (ref 10.3–14.5)
SODIUM SERPL-SCNC: 139 MMOL/L — SIGNIFICANT CHANGE UP (ref 135–145)
WBC # BLD: 8.16 K/UL — SIGNIFICANT CHANGE UP (ref 3.8–10.5)
WBC # FLD AUTO: 8.16 K/UL — SIGNIFICANT CHANGE UP (ref 3.8–10.5)

## 2025-08-06 PROCEDURE — 27130 TOTAL HIP ARTHROPLASTY: CPT | Mod: AS,LT

## 2025-08-06 PROCEDURE — 73501 X-RAY EXAM HIP UNI 1 VIEW: CPT | Mod: 26

## 2025-08-06 PROCEDURE — 27130 TOTAL HIP ARTHROPLASTY: CPT | Mod: LT

## 2025-08-06 DEVICE — SHELL ACET G7 OSSEOTI E HEMISPHR 3 HOLE 52MM: Type: IMPLANTABLE DEVICE | Site: LEFT HIP | Status: FUNCTIONAL

## 2025-08-06 DEVICE — IMPLANTABLE DEVICE: Type: IMPLANTABLE DEVICE | Site: LEFT HIP | Status: FUNCTIONAL

## 2025-08-06 DEVICE — HEAD FEM BIOLOX DELTA CERAMIC 36MM -3.5MM: Type: IMPLANTABLE DEVICE | Site: LEFT HIP | Status: FUNCTIONAL

## 2025-08-06 DEVICE — LINER ACET VIT E G7 NEUT SZ E 36MM: Type: IMPLANTABLE DEVICE | Site: LEFT HIP | Status: FUNCTIONAL

## 2025-08-06 RX ORDER — CEFAZOLIN SODIUM IN 0.9 % NACL 3 G/100 ML
2000 INTRAVENOUS SOLUTION, PIGGYBACK (ML) INTRAVENOUS EVERY 8 HOURS
Refills: 0 | Status: COMPLETED | OUTPATIENT
Start: 2025-08-06 | End: 2025-08-07

## 2025-08-06 RX ORDER — RAMIPRIL 2.5 MG/1
1 CAPSULE ORAL
Refills: 0 | DISCHARGE

## 2025-08-06 RX ORDER — ONDANSETRON HCL/PF 4 MG/2 ML
4 VIAL (ML) INJECTION ONCE
Refills: 0 | Status: DISCONTINUED | OUTPATIENT
Start: 2025-08-06 | End: 2025-08-06

## 2025-08-06 RX ORDER — ACETAMINOPHEN 500 MG/5ML
1000 LIQUID (ML) ORAL EVERY 8 HOURS
Refills: 0 | Status: DISCONTINUED | OUTPATIENT
Start: 2025-08-06 | End: 2025-08-07

## 2025-08-06 RX ORDER — CELECOXIB 50 MG/1
200 CAPSULE ORAL ONCE
Refills: 0 | Status: COMPLETED | OUTPATIENT
Start: 2025-08-06 | End: 2025-08-06

## 2025-08-06 RX ORDER — ACETAMINOPHEN 500 MG/5ML
1000 LIQUID (ML) ORAL EVERY 8 HOURS
Refills: 0 | Status: DISCONTINUED | OUTPATIENT
Start: 2025-08-06 | End: 2025-08-06

## 2025-08-06 RX ORDER — OXYCODONE HYDROCHLORIDE 30 MG/1
10 TABLET ORAL
Refills: 0 | Status: DISCONTINUED | OUTPATIENT
Start: 2025-08-06 | End: 2025-08-06

## 2025-08-06 RX ORDER — FENTANYL CITRATE-0.9 % NACL/PF 100MCG/2ML
50 SYRINGE (ML) INTRAVENOUS ONCE
Refills: 0 | Status: DISCONTINUED | OUTPATIENT
Start: 2025-08-06 | End: 2025-08-06

## 2025-08-06 RX ORDER — SODIUM CHLORIDE 9 G/1000ML
1000 INJECTION, SOLUTION INTRAVENOUS
Refills: 0 | Status: DISCONTINUED | OUTPATIENT
Start: 2025-08-06 | End: 2025-08-06

## 2025-08-06 RX ORDER — TRAMADOL HYDROCHLORIDE 50 MG/1
25 TABLET, FILM COATED ORAL EVERY 4 HOURS
Refills: 0 | Status: DISCONTINUED | OUTPATIENT
Start: 2025-08-06 | End: 2025-08-07

## 2025-08-06 RX ORDER — TRAMADOL HYDROCHLORIDE 50 MG/1
50 TABLET, FILM COATED ORAL EVERY 4 HOURS
Refills: 0 | Status: DISCONTINUED | OUTPATIENT
Start: 2025-08-06 | End: 2025-08-07

## 2025-08-06 RX ORDER — ATORVASTATIN CALCIUM 80 MG/1
1 TABLET, FILM COATED ORAL
Refills: 0 | DISCHARGE

## 2025-08-06 RX ORDER — OXYCODONE HYDROCHLORIDE 30 MG/1
5 TABLET ORAL
Refills: 0 | Status: DISCONTINUED | OUTPATIENT
Start: 2025-08-06 | End: 2025-08-06

## 2025-08-06 RX ORDER — FENTANYL CITRATE-0.9 % NACL/PF 100MCG/2ML
25 SYRINGE (ML) INTRAVENOUS
Refills: 0 | Status: DISCONTINUED | OUTPATIENT
Start: 2025-08-06 | End: 2025-08-06

## 2025-08-06 RX ORDER — ACETAMINOPHEN 500 MG/5ML
650 LIQUID (ML) ORAL ONCE
Refills: 0 | Status: COMPLETED | OUTPATIENT
Start: 2025-08-06 | End: 2025-08-06

## 2025-08-06 RX ADMIN — KETOROLAC TROMETHAMINE 15 MILLIGRAM(S): 30 INJECTION, SOLUTION INTRAMUSCULAR; INTRAVENOUS at 19:20

## 2025-08-06 RX ADMIN — SODIUM CHLORIDE 100 MILLILITER(S): 9 INJECTION, SOLUTION INTRAVENOUS at 10:04

## 2025-08-06 RX ADMIN — Medication 2 TABLET(S): at 22:47

## 2025-08-06 RX ADMIN — Medication 500 MILLIGRAM(S): at 18:22

## 2025-08-06 RX ADMIN — TRAMADOL HYDROCHLORIDE 50 MILLIGRAM(S): 50 TABLET, FILM COATED ORAL at 15:27

## 2025-08-06 RX ADMIN — Medication 1000 MILLIGRAM(S): at 22:47

## 2025-08-06 RX ADMIN — Medication 1000 MILLIGRAM(S): at 14:29

## 2025-08-06 RX ADMIN — Medication 650 MILLIGRAM(S): at 07:30

## 2025-08-06 RX ADMIN — KETOROLAC TROMETHAMINE 15 MILLIGRAM(S): 30 INJECTION, SOLUTION INTRAMUSCULAR; INTRAVENOUS at 11:25

## 2025-08-06 RX ADMIN — Medication 100 MILLIGRAM(S): at 16:02

## 2025-08-06 RX ADMIN — Medication 4 MILLIGRAM(S): at 11:41

## 2025-08-06 RX ADMIN — TRAMADOL HYDROCHLORIDE 50 MILLIGRAM(S): 50 TABLET, FILM COATED ORAL at 14:30

## 2025-08-06 RX ADMIN — SODIUM CHLORIDE 125 MILLILITER(S): 9 INJECTION, SOLUTION INTRAVENOUS at 11:25

## 2025-08-06 RX ADMIN — Medication 1 TABLET(S): at 11:24

## 2025-08-06 RX ADMIN — DILTIAZEM HYDROCHLORIDE 60 MILLIGRAM(S): 240 TABLET, EXTENDED RELEASE ORAL at 19:19

## 2025-08-06 RX ADMIN — POLYETHYLENE GLYCOL 3350 17 GRAM(S): 17 POWDER, FOR SOLUTION ORAL at 22:47

## 2025-08-06 RX ADMIN — ATORVASTATIN CALCIUM 40 MILLIGRAM(S): 80 TABLET, FILM COATED ORAL at 22:47

## 2025-08-06 RX ADMIN — GABAPENTIN 100 MILLIGRAM(S): 400 CAPSULE ORAL at 22:47

## 2025-08-06 RX ADMIN — Medication 1000 MILLIGRAM(S): at 15:27

## 2025-08-06 RX ADMIN — Medication 650 MILLIGRAM(S): at 06:58

## 2025-08-06 RX ADMIN — CELECOXIB 200 MILLIGRAM(S): 50 CAPSULE ORAL at 06:58

## 2025-08-06 RX ADMIN — Medication 1000 MILLIGRAM(S): at 23:45

## 2025-08-06 RX ADMIN — CELECOXIB 200 MILLIGRAM(S): 50 CAPSULE ORAL at 07:30

## 2025-08-06 RX ADMIN — Medication 3 MILLILITER(S): at 07:30

## 2025-08-06 RX ADMIN — KETOROLAC TROMETHAMINE 15 MILLIGRAM(S): 30 INJECTION, SOLUTION INTRAMUSCULAR; INTRAVENOUS at 12:00

## 2025-08-06 RX ADMIN — KETOROLAC TROMETHAMINE 15 MILLIGRAM(S): 30 INJECTION, SOLUTION INTRAMUSCULAR; INTRAVENOUS at 18:22

## 2025-08-06 RX ADMIN — GABAPENTIN 100 MILLIGRAM(S): 400 CAPSULE ORAL at 14:30

## 2025-08-07 VITALS
TEMPERATURE: 98 F | RESPIRATION RATE: 17 BRPM | HEART RATE: 74 BPM | OXYGEN SATURATION: 95 % | DIASTOLIC BLOOD PRESSURE: 79 MMHG | SYSTOLIC BLOOD PRESSURE: 152 MMHG

## 2025-08-07 LAB
ANION GAP SERPL CALC-SCNC: 8 MMOL/L — SIGNIFICANT CHANGE UP (ref 5–17)
BUN SERPL-MCNC: 17 MG/DL — SIGNIFICANT CHANGE UP (ref 7–23)
CALCIUM SERPL-MCNC: 9.7 MG/DL — SIGNIFICANT CHANGE UP (ref 8.5–10.1)
CHLORIDE SERPL-SCNC: 104 MMOL/L — SIGNIFICANT CHANGE UP (ref 96–108)
CO2 SERPL-SCNC: 26 MMOL/L — SIGNIFICANT CHANGE UP (ref 22–31)
CREAT SERPL-MCNC: 1.08 MG/DL — SIGNIFICANT CHANGE UP (ref 0.5–1.3)
EGFR: 53 ML/MIN/1.73M2 — LOW
EGFR: 53 ML/MIN/1.73M2 — LOW
GLUCOSE SERPL-MCNC: 140 MG/DL — HIGH (ref 70–99)
HCT VFR BLD CALC: 37.2 % — SIGNIFICANT CHANGE UP (ref 34.5–45)
HGB BLD-MCNC: 12.4 G/DL — SIGNIFICANT CHANGE UP (ref 11.5–15.5)
MCHC RBC-ENTMCNC: 29.6 PG — SIGNIFICANT CHANGE UP (ref 27–34)
MCHC RBC-ENTMCNC: 33.3 G/DL — SIGNIFICANT CHANGE UP (ref 32–36)
MCV RBC AUTO: 88.8 FL — SIGNIFICANT CHANGE UP (ref 80–100)
NRBC BLD AUTO-RTO: 0 /100 WBCS — SIGNIFICANT CHANGE UP (ref 0–0)
PLATELET # BLD AUTO: 285 K/UL — SIGNIFICANT CHANGE UP (ref 150–400)
POTASSIUM SERPL-MCNC: 3.8 MMOL/L — SIGNIFICANT CHANGE UP (ref 3.5–5.3)
POTASSIUM SERPL-SCNC: 3.8 MMOL/L — SIGNIFICANT CHANGE UP (ref 3.5–5.3)
RBC # BLD: 4.19 M/UL — SIGNIFICANT CHANGE UP (ref 3.8–5.2)
RBC # FLD: 13 % — SIGNIFICANT CHANGE UP (ref 10.3–14.5)
SODIUM SERPL-SCNC: 138 MMOL/L — SIGNIFICANT CHANGE UP (ref 135–145)
WBC # BLD: 21.06 K/UL — HIGH (ref 3.8–10.5)
WBC # FLD AUTO: 21.06 K/UL — HIGH (ref 3.8–10.5)

## 2025-08-07 RX ORDER — B1/B2/B3/B5/B6/B12/VIT C/FOLIC 500-0.5 MG
1 TABLET ORAL
Qty: 0 | Refills: 0 | DISCHARGE
Start: 2025-08-07

## 2025-08-07 RX ORDER — ASPIRIN 325 MG
1 TABLET ORAL
Qty: 60 | Refills: 0
Start: 2025-08-07 | End: 2025-09-05

## 2025-08-07 RX ORDER — TRAMADOL HYDROCHLORIDE 50 MG/1
1 TABLET, FILM COATED ORAL
Qty: 42 | Refills: 0
Start: 2025-08-07 | End: 2025-08-13

## 2025-08-07 RX ORDER — SENNA 187 MG
2 TABLET ORAL
Qty: 0 | Refills: 0 | DISCHARGE
Start: 2025-08-07

## 2025-08-07 RX ORDER — ONDANSETRON HCL/PF 4 MG/2 ML
1 VIAL (ML) INJECTION
Qty: 20 | Refills: 0
Start: 2025-08-07 | End: 2025-08-11

## 2025-08-07 RX ORDER — CYCLOBENZAPRINE HYDROCHLORIDE 15 MG/1
1 CAPSULE, EXTENDED RELEASE ORAL
Qty: 21 | Refills: 0
Start: 2025-08-07 | End: 2025-08-13

## 2025-08-07 RX ORDER — POLYETHYLENE GLYCOL 3350 17 G/17G
17 POWDER, FOR SOLUTION ORAL
Qty: 0 | Refills: 0 | DISCHARGE
Start: 2025-08-07

## 2025-08-07 RX ORDER — CELECOXIB 50 MG/1
1 CAPSULE ORAL
Qty: 60 | Refills: 0
Start: 2025-08-07 | End: 2025-09-05

## 2025-08-07 RX ORDER — NALOXONE HYDROCHLORIDE 0.4 MG/ML
4 INJECTION, SOLUTION INTRAMUSCULAR; INTRAVENOUS; SUBCUTANEOUS
Qty: 1 | Refills: 0
Start: 2025-08-07 | End: 2025-08-07

## 2025-08-07 RX ORDER — GABAPENTIN 400 MG/1
1 CAPSULE ORAL
Qty: 42 | Refills: 0
Start: 2025-08-07 | End: 2025-08-20

## 2025-08-07 RX ORDER — ACETAMINOPHEN 500 MG/5ML
2 LIQUID (ML) ORAL
Qty: 0 | Refills: 0 | DISCHARGE
Start: 2025-08-07

## 2025-08-07 RX ADMIN — LISINOPRIL 40 MILLIGRAM(S): 5 TABLET ORAL at 06:28

## 2025-08-07 RX ADMIN — Medication 1000 MILLIGRAM(S): at 06:28

## 2025-08-07 RX ADMIN — KETOROLAC TROMETHAMINE 15 MILLIGRAM(S): 30 INJECTION, SOLUTION INTRAMUSCULAR; INTRAVENOUS at 07:03

## 2025-08-07 RX ADMIN — Medication 1000 MILLIGRAM(S): at 07:03

## 2025-08-07 RX ADMIN — KETOROLAC TROMETHAMINE 15 MILLIGRAM(S): 30 INJECTION, SOLUTION INTRAMUSCULAR; INTRAVENOUS at 00:28

## 2025-08-07 RX ADMIN — DILTIAZEM HYDROCHLORIDE 60 MILLIGRAM(S): 240 TABLET, EXTENDED RELEASE ORAL at 06:34

## 2025-08-07 RX ADMIN — KETOROLAC TROMETHAMINE 15 MILLIGRAM(S): 30 INJECTION, SOLUTION INTRAMUSCULAR; INTRAVENOUS at 06:29

## 2025-08-07 RX ADMIN — Medication 500 MILLIGRAM(S): at 06:28

## 2025-08-07 RX ADMIN — Medication 81 MILLIGRAM(S): at 06:28

## 2025-08-07 RX ADMIN — Medication 40 MILLIGRAM(S): at 06:28

## 2025-08-07 RX ADMIN — CELECOXIB 200 MILLIGRAM(S): 50 CAPSULE ORAL at 06:28

## 2025-08-07 RX ADMIN — KETOROLAC TROMETHAMINE 15 MILLIGRAM(S): 30 INJECTION, SOLUTION INTRAMUSCULAR; INTRAVENOUS at 01:25

## 2025-08-07 RX ADMIN — GABAPENTIN 100 MILLIGRAM(S): 400 CAPSULE ORAL at 06:29

## 2025-08-07 RX ADMIN — Medication 100 MILLIGRAM(S): at 00:28

## 2025-08-07 RX ADMIN — SODIUM CHLORIDE 125 MILLILITER(S): 9 INJECTION, SOLUTION INTRAVENOUS at 06:00

## 2025-08-07 RX ADMIN — CELECOXIB 200 MILLIGRAM(S): 50 CAPSULE ORAL at 07:03

## 2025-08-07 RX ADMIN — DEXAMETHASONE 101.6 MILLIGRAM(S): 0.5 TABLET ORAL at 06:29

## 2025-08-16 ENCOUNTER — NON-APPOINTMENT (OUTPATIENT)
Age: 80
End: 2025-08-16

## 2025-08-19 DIAGNOSIS — Z95.0 PRESENCE OF CARDIAC PACEMAKER: ICD-10-CM

## 2025-08-19 DIAGNOSIS — M16.12 UNILATERAL PRIMARY OSTEOARTHRITIS, LEFT HIP: ICD-10-CM

## 2025-08-19 DIAGNOSIS — I25.10 ATHEROSCLEROTIC HEART DISEASE OF NATIVE CORONARY ARTERY WITHOUT ANGINA PECTORIS: ICD-10-CM

## 2025-08-19 DIAGNOSIS — I10 ESSENTIAL (PRIMARY) HYPERTENSION: ICD-10-CM

## 2025-08-19 DIAGNOSIS — J45.909 UNSPECIFIED ASTHMA, UNCOMPLICATED: ICD-10-CM

## 2025-08-26 ENCOUNTER — APPOINTMENT (OUTPATIENT)
Dept: ORTHOPEDIC SURGERY | Facility: CLINIC | Age: 80
End: 2025-08-26
Payer: COMMERCIAL

## 2025-08-26 DIAGNOSIS — Z96.642 PRESENCE OF LEFT ARTIFICIAL HIP JOINT: ICD-10-CM

## 2025-08-26 PROBLEM — R73.03 PREDIABETES: Chronic | Status: ACTIVE | Noted: 2025-07-22

## 2025-08-26 PROCEDURE — 73503 X-RAY EXAM HIP UNI 4/> VIEWS: CPT | Mod: LT

## 2025-08-26 PROCEDURE — 99024 POSTOP FOLLOW-UP VISIT: CPT

## 2025-09-10 ENCOUNTER — APPOINTMENT (OUTPATIENT)
Dept: FAMILY MEDICINE | Facility: CLINIC | Age: 80
End: 2025-09-10
Payer: COMMERCIAL

## 2025-09-10 VITALS
BODY MASS INDEX: 24.82 KG/M2 | HEART RATE: 89 BPM | DIASTOLIC BLOOD PRESSURE: 80 MMHG | HEIGHT: 67.5 IN | WEIGHT: 160 LBS | SYSTOLIC BLOOD PRESSURE: 139 MMHG | OXYGEN SATURATION: 95 % | TEMPERATURE: 97.3 F

## 2025-09-10 DIAGNOSIS — I25.10 ATHEROSCLEROTIC HEART DISEASE OF NATIVE CORONARY ARTERY W/OUT ANGINA PECTORIS: ICD-10-CM

## 2025-09-10 DIAGNOSIS — R73.03 PREDIABETES.: ICD-10-CM

## 2025-09-10 DIAGNOSIS — I10 ESSENTIAL (PRIMARY) HYPERTENSION: ICD-10-CM

## 2025-09-10 PROCEDURE — 83036 HEMOGLOBIN GLYCOSYLATED A1C: CPT | Mod: QW

## 2025-09-10 PROCEDURE — 99397 PER PM REEVAL EST PAT 65+ YR: CPT | Mod: 25

## 2025-09-10 PROCEDURE — 36415 COLL VENOUS BLD VENIPUNCTURE: CPT

## 2025-09-11 ENCOUNTER — NON-APPOINTMENT (OUTPATIENT)
Age: 80
End: 2025-09-11

## 2025-09-11 DIAGNOSIS — Z00.00 ENCOUNTER FOR GENERAL ADULT MEDICAL EXAMINATION W/OUT ABNORMAL FINDINGS: ICD-10-CM

## 2025-09-11 LAB
ESTIMATED AVERAGE GLUCOSE: 123 MG/DL
HBA1C MFR BLD HPLC: 5.9 %

## (undated) DEVICE — PACK TOTAL HIP

## (undated) DEVICE — SUT STRATAFIX SPIRAL PDS PLUS 2-0 45CM CT-1

## (undated) DEVICE — SUT VICRYL PLUS 0 18" CT-1 UNDYED (POP-OFF)

## (undated) DEVICE — POSITIONER PATIENT SAFETY STRAP 3X60"

## (undated) DEVICE — SUT STRATAFIX SPIRAL MONOCRYL PLUS 4-0 70CM PS-2 UNDYED

## (undated) DEVICE — BLADE SURGICAL #11 CARBON

## (undated) DEVICE — Device

## (undated) DEVICE — SAW BLADE STRYKER SAGITTAL PERFORMANCE SERIES 25X1.19X90MM

## (undated) DEVICE — ELCTR BOVIE TIP BLADE INSULATED 6.5" EDGE

## (undated) DEVICE — MARKING PEN W RULER

## (undated) DEVICE — SUT STRATAFIX SYMMETRIC PDS PLUS 1 18" CTX VIOLET

## (undated) DEVICE — PREP CHLORAPREP HI-LITE ORANGE 26ML

## (undated) DEVICE — DRAPE C ARM 41X140"

## (undated) DEVICE — ELCTR STRYKER NEPTUNE SMOKE EVACUATION PENCIL (GREEN)

## (undated) DEVICE — SUCTION YANKAUER TAPERED BULBOUS NO VENT

## (undated) DEVICE — SUT ETHIBOND 5 4-30" V-37

## (undated) DEVICE — ELCTR AQUAMANTYS BIPOLAR SEALER 6.0

## (undated) DEVICE — PREP BETADINE POUCH 0.75OZ

## (undated) DEVICE — GLV 7 PROTEXIS (BLUE)

## (undated) DEVICE — SYR LUER LOK 50CC

## (undated) DEVICE — DRAPE SPLIT SHEET 77" X 120"

## (undated) DEVICE — DRAPE GYN IRRIGATION POUCH 19 X 23"

## (undated) DEVICE — FRA-ESU BOVIE FORCE TRIAD T0E42286E: Type: DURABLE MEDICAL EQUIPMENT

## (undated) DEVICE — NDL HYPO SAFE 18G X 1.5" (PINK)

## (undated) DEVICE — HOOD T5 PEELAWAY

## (undated) DEVICE — MAKO VIZADISC KNEE TRACKING KIT

## (undated) DEVICE — ZIMMER PULSAVAC PLUS FAN KIT

## (undated) DEVICE — DRAPE U (CLEAR) 47 X 51"

## (undated) DEVICE — DRSG DERMABOND PRINEO 22CM

## (undated) DEVICE — MEDICATION LABELS W MARKER

## (undated) DEVICE — GOWN IMPERV BREATHABLE XL

## (undated) DEVICE — DRAPE 3/4 SHEET W REINFORCEMENT 56X77"

## (undated) DEVICE — NDL HYPO SAFE 20G X 1.5" (YELLOW)

## (undated) DEVICE — DRAPE TOWEL BLUE 17" X 24"

## (undated) DEVICE — POSITIONER FOAM HEAD DONUT 9" (PINK)

## (undated) DEVICE — GLV 7 PROTEXIS ORTHO (BROWN)

## (undated) DEVICE — DRAPE IOBAN 23" X 17"

## (undated) DEVICE — SUT VICRYL PLUS 4-0 27" PS-2 UNDYED

## (undated) DEVICE — POSITIONER HANA PATIENT CARE KIT POSITION SUPINE